# Patient Record
Sex: FEMALE | Race: WHITE | NOT HISPANIC OR LATINO | URBAN - METROPOLITAN AREA
[De-identification: names, ages, dates, MRNs, and addresses within clinical notes are randomized per-mention and may not be internally consistent; named-entity substitution may affect disease eponyms.]

---

## 2017-04-20 ENCOUNTER — HOSPITAL ENCOUNTER (OUTPATIENT)
Dept: MAMMOGRAPHY | Facility: CLINIC | Age: 77
Discharge: HOME | End: 2017-04-20
Attending: FAMILY MEDICINE

## 2017-04-20 ENCOUNTER — COMMUNICATION - HEALTHEAST (OUTPATIENT)
Dept: TELEHEALTH | Facility: CLINIC | Age: 77
End: 2017-04-20

## 2017-04-20 DIAGNOSIS — Z12.31 VISIT FOR SCREENING MAMMOGRAM: ICD-10-CM

## 2017-07-05 ENCOUNTER — AMBULATORY - HEALTHEAST (OUTPATIENT)
Dept: ADMINISTRATIVE | Facility: REHABILITATION | Age: 77
End: 2017-07-05

## 2017-07-05 DIAGNOSIS — M12.811 ROTATOR CUFF TEAR ARTHROPATHY OF RIGHT SHOULDER: ICD-10-CM

## 2017-07-05 DIAGNOSIS — M75.101 ROTATOR CUFF TEAR ARTHROPATHY OF RIGHT SHOULDER: ICD-10-CM

## 2017-07-10 ENCOUNTER — OFFICE VISIT - HEALTHEAST (OUTPATIENT)
Dept: PHYSICAL THERAPY | Facility: REHABILITATION | Age: 77
End: 2017-07-10

## 2017-07-10 ENCOUNTER — COMMUNICATION - HEALTHEAST (OUTPATIENT)
Dept: TELEHEALTH | Facility: CLINIC | Age: 77
End: 2017-07-10

## 2017-07-10 DIAGNOSIS — M81.0 OSTEOPOROSIS: ICD-10-CM

## 2017-07-10 DIAGNOSIS — M75.101 ROTATOR CUFF TEAR ARTHROPATHY OF RIGHT SHOULDER: ICD-10-CM

## 2017-07-10 DIAGNOSIS — M12.811 ROTATOR CUFF TEAR ARTHROPATHY OF RIGHT SHOULDER: ICD-10-CM

## 2017-07-10 DIAGNOSIS — M25.511 PAIN IN JOINT OF RIGHT SHOULDER: ICD-10-CM

## 2017-07-12 ENCOUNTER — OFFICE VISIT - HEALTHEAST (OUTPATIENT)
Dept: PHYSICAL THERAPY | Facility: REHABILITATION | Age: 77
End: 2017-07-12

## 2017-07-12 DIAGNOSIS — M75.101 ROTATOR CUFF TEAR ARTHROPATHY OF RIGHT SHOULDER: ICD-10-CM

## 2017-07-12 DIAGNOSIS — M25.511 PAIN IN JOINT OF RIGHT SHOULDER: ICD-10-CM

## 2017-07-12 DIAGNOSIS — M81.0 OSTEOPOROSIS: ICD-10-CM

## 2017-07-12 DIAGNOSIS — M12.811 ROTATOR CUFF TEAR ARTHROPATHY OF RIGHT SHOULDER: ICD-10-CM

## 2017-07-19 ENCOUNTER — OFFICE VISIT - HEALTHEAST (OUTPATIENT)
Dept: PHYSICAL THERAPY | Facility: REHABILITATION | Age: 77
End: 2017-07-19

## 2017-07-19 DIAGNOSIS — M12.811 ROTATOR CUFF TEAR ARTHROPATHY OF RIGHT SHOULDER: ICD-10-CM

## 2017-07-19 DIAGNOSIS — M25.511 PAIN IN JOINT OF RIGHT SHOULDER: ICD-10-CM

## 2017-07-19 DIAGNOSIS — M75.101 ROTATOR CUFF TEAR ARTHROPATHY OF RIGHT SHOULDER: ICD-10-CM

## 2017-07-19 DIAGNOSIS — M81.0 OSTEOPOROSIS: ICD-10-CM

## 2017-07-26 ENCOUNTER — OFFICE VISIT - HEALTHEAST (OUTPATIENT)
Dept: PHYSICAL THERAPY | Facility: REHABILITATION | Age: 77
End: 2017-07-26

## 2017-07-26 DIAGNOSIS — M75.101 ROTATOR CUFF TEAR ARTHROPATHY OF RIGHT SHOULDER: ICD-10-CM

## 2017-07-26 DIAGNOSIS — M12.811 ROTATOR CUFF TEAR ARTHROPATHY OF RIGHT SHOULDER: ICD-10-CM

## 2017-07-26 DIAGNOSIS — M81.0 OSTEOPOROSIS: ICD-10-CM

## 2017-07-26 DIAGNOSIS — M25.511 PAIN IN JOINT OF RIGHT SHOULDER: ICD-10-CM

## 2017-08-02 ENCOUNTER — OFFICE VISIT - HEALTHEAST (OUTPATIENT)
Dept: PHYSICAL THERAPY | Facility: REHABILITATION | Age: 77
End: 2017-08-02

## 2017-08-02 DIAGNOSIS — M75.101 ROTATOR CUFF TEAR ARTHROPATHY OF RIGHT SHOULDER: ICD-10-CM

## 2017-08-02 DIAGNOSIS — M12.811 ROTATOR CUFF TEAR ARTHROPATHY OF RIGHT SHOULDER: ICD-10-CM

## 2017-08-02 DIAGNOSIS — M81.0 OSTEOPOROSIS: ICD-10-CM

## 2017-08-02 DIAGNOSIS — M25.511 PAIN IN JOINT OF RIGHT SHOULDER: ICD-10-CM

## 2017-08-09 ENCOUNTER — OFFICE VISIT - HEALTHEAST (OUTPATIENT)
Dept: PHYSICAL THERAPY | Facility: REHABILITATION | Age: 77
End: 2017-08-09

## 2017-08-09 DIAGNOSIS — M75.101 ROTATOR CUFF TEAR ARTHROPATHY OF RIGHT SHOULDER: ICD-10-CM

## 2017-08-09 DIAGNOSIS — M12.811 ROTATOR CUFF TEAR ARTHROPATHY OF RIGHT SHOULDER: ICD-10-CM

## 2017-08-09 DIAGNOSIS — M25.511 PAIN IN JOINT OF RIGHT SHOULDER: ICD-10-CM

## 2017-08-09 DIAGNOSIS — M81.0 OSTEOPOROSIS: ICD-10-CM

## 2017-08-16 ENCOUNTER — OFFICE VISIT - HEALTHEAST (OUTPATIENT)
Dept: PHYSICAL THERAPY | Facility: REHABILITATION | Age: 77
End: 2017-08-16

## 2017-08-16 DIAGNOSIS — M25.511 PAIN IN JOINT OF RIGHT SHOULDER: ICD-10-CM

## 2017-08-16 DIAGNOSIS — M12.811 ROTATOR CUFF TEAR ARTHROPATHY OF RIGHT SHOULDER: ICD-10-CM

## 2017-08-16 DIAGNOSIS — M75.101 ROTATOR CUFF TEAR ARTHROPATHY OF RIGHT SHOULDER: ICD-10-CM

## 2017-08-16 DIAGNOSIS — M81.0 OSTEOPOROSIS: ICD-10-CM

## 2017-08-23 ENCOUNTER — OFFICE VISIT - HEALTHEAST (OUTPATIENT)
Dept: PHYSICAL THERAPY | Facility: REHABILITATION | Age: 77
End: 2017-08-23

## 2017-08-23 DIAGNOSIS — M75.101 ROTATOR CUFF TEAR ARTHROPATHY OF RIGHT SHOULDER: ICD-10-CM

## 2017-08-23 DIAGNOSIS — M81.0 OSTEOPOROSIS: ICD-10-CM

## 2017-08-23 DIAGNOSIS — M12.811 ROTATOR CUFF TEAR ARTHROPATHY OF RIGHT SHOULDER: ICD-10-CM

## 2017-08-23 DIAGNOSIS — M25.511 PAIN IN JOINT OF RIGHT SHOULDER: ICD-10-CM

## 2017-09-11 ENCOUNTER — OFFICE VISIT - HEALTHEAST (OUTPATIENT)
Dept: PHYSICAL THERAPY | Facility: REHABILITATION | Age: 77
End: 2017-09-11

## 2017-09-11 DIAGNOSIS — M25.511 PAIN IN JOINT OF RIGHT SHOULDER: ICD-10-CM

## 2017-09-11 DIAGNOSIS — M75.101 ROTATOR CUFF TEAR ARTHROPATHY OF RIGHT SHOULDER: ICD-10-CM

## 2017-09-11 DIAGNOSIS — M12.811 ROTATOR CUFF TEAR ARTHROPATHY OF RIGHT SHOULDER: ICD-10-CM

## 2017-09-11 DIAGNOSIS — M81.0 OSTEOPOROSIS: ICD-10-CM

## 2018-05-29 ENCOUNTER — HOSPITAL ENCOUNTER (OUTPATIENT)
Dept: MAMMOGRAPHY | Facility: CLINIC | Age: 78
Discharge: HOME | End: 2018-05-29

## 2018-05-29 DIAGNOSIS — Z12.31 VISIT FOR SCREENING MAMMOGRAM: ICD-10-CM

## 2018-08-20 ENCOUNTER — RECORDS - HEALTHEAST (OUTPATIENT)
Dept: LAB | Facility: CLINIC | Age: 78
End: 2018-08-20

## 2018-08-20 LAB
ALBUMIN SERPL-MCNC: 3.9 G/DL (ref 3.5–5)
ANION GAP SERPL CALCULATED.3IONS-SCNC: 11 MMOL/L (ref 5–18)
BUN SERPL-MCNC: 13 MG/DL (ref 8–28)
CALCIUM SERPL-MCNC: 10 MG/DL (ref 8.5–10.5)
CHLORIDE BLD-SCNC: 104 MMOL/L (ref 98–107)
CO2 SERPL-SCNC: 24 MMOL/L (ref 22–31)
CREAT SERPL-MCNC: 0.71 MG/DL (ref 0.6–1.1)
GFR SERPL CREATININE-BSD FRML MDRD: >60 ML/MIN/1.73M2
GLUCOSE BLD-MCNC: 82 MG/DL (ref 70–125)
PHOSPHATE SERPL-MCNC: 3.8 MG/DL (ref 2.5–4.5)
POTASSIUM BLD-SCNC: 5.3 MMOL/L (ref 3.5–5)
SODIUM SERPL-SCNC: 139 MMOL/L (ref 136–145)

## 2018-08-27 ASSESSMENT — MIFFLIN-ST. JEOR
SCORE: 922.21
SCORE: 914.27

## 2018-08-30 ENCOUNTER — ANESTHESIA - HEALTHEAST (OUTPATIENT)
Dept: SURGERY | Facility: CLINIC | Age: 78
End: 2018-08-30

## 2018-08-30 ENCOUNTER — SURGERY - HEALTHEAST (OUTPATIENT)
Dept: SURGERY | Facility: CLINIC | Age: 78
End: 2018-08-30

## 2018-08-30 ASSESSMENT — MIFFLIN-ST. JEOR: SCORE: 918.8

## 2018-09-12 ENCOUNTER — RECORDS - HEALTHEAST (OUTPATIENT)
Dept: LAB | Facility: CLINIC | Age: 78
End: 2018-09-12

## 2018-09-12 LAB
CHOLEST SERPL-MCNC: 179 MG/DL
FASTING STATUS PATIENT QL REPORTED: NO
HDLC SERPL-MCNC: 61 MG/DL
LDLC SERPL CALC-MCNC: 105 MG/DL
TRIGL SERPL-MCNC: 67 MG/DL

## 2018-09-13 LAB — 25(OH)D3 SERPL-MCNC: 58 NG/ML (ref 30–80)

## 2018-11-06 ASSESSMENT — MIFFLIN-ST. JEOR: SCORE: 905.19

## 2018-11-08 ENCOUNTER — SURGERY - HEALTHEAST (OUTPATIENT)
Dept: SURGERY | Facility: CLINIC | Age: 78
End: 2018-11-08

## 2018-11-08 ENCOUNTER — ANESTHESIA - HEALTHEAST (OUTPATIENT)
Dept: SURGERY | Facility: CLINIC | Age: 78
End: 2018-11-08

## 2018-11-08 ASSESSMENT — MIFFLIN-ST. JEOR: SCORE: 897.54

## 2019-03-28 ENCOUNTER — RECORDS - HEALTHEAST (OUTPATIENT)
Dept: LAB | Facility: CLINIC | Age: 79
End: 2019-03-28

## 2019-03-28 LAB
ALBUMIN SERPL-MCNC: 3.7 G/DL (ref 3.5–5)
ALP SERPL-CCNC: 70 U/L (ref 45–120)
ALT SERPL W P-5'-P-CCNC: 22 U/L (ref 0–45)
ANION GAP SERPL CALCULATED.3IONS-SCNC: 7 MMOL/L (ref 5–18)
AST SERPL W P-5'-P-CCNC: 25 U/L (ref 0–40)
BILIRUB SERPL-MCNC: 0.4 MG/DL (ref 0–1)
BUN SERPL-MCNC: 25 MG/DL (ref 8–28)
CALCIUM SERPL-MCNC: 9.8 MG/DL (ref 8.5–10.5)
CHLORIDE BLD-SCNC: 107 MMOL/L (ref 98–107)
CO2 SERPL-SCNC: 27 MMOL/L (ref 22–31)
CREAT SERPL-MCNC: 0.8 MG/DL (ref 0.6–1.1)
GFR SERPL CREATININE-BSD FRML MDRD: >60 ML/MIN/1.73M2
GLUCOSE BLD-MCNC: 110 MG/DL (ref 70–125)
POTASSIUM BLD-SCNC: 4.6 MMOL/L (ref 3.5–5)
PROT SERPL-MCNC: 6.5 G/DL (ref 6–8)
SODIUM SERPL-SCNC: 141 MMOL/L (ref 136–145)
TSH SERPL DL<=0.005 MIU/L-ACNC: 1.07 UIU/ML (ref 0.3–5)

## 2019-03-28 ASSESSMENT — MIFFLIN-ST. JEOR
SCORE: 931.28
SCORE: 931.28

## 2019-03-29 ENCOUNTER — SURGERY - HEALTHEAST (OUTPATIENT)
Dept: CARDIOLOGY | Facility: CLINIC | Age: 79
End: 2019-03-29

## 2019-03-30 ASSESSMENT — MIFFLIN-ST. JEOR: SCORE: 940.35

## 2019-04-05 ENCOUNTER — AMBULATORY - HEALTHEAST (OUTPATIENT)
Dept: CARDIOLOGY | Facility: CLINIC | Age: 79
End: 2019-04-05

## 2019-04-05 DIAGNOSIS — Z95.0 CARDIAC PACEMAKER IN SITU: ICD-10-CM

## 2019-04-05 LAB
HCC DEVICE COMMENTS: NORMAL
HCC DEVICE IMPLANTING PROVIDER: NORMAL
HCC DEVICE MANUFACTURE: NORMAL
HCC DEVICE MODEL: NORMAL
HCC DEVICE SERIAL NUMBER: NORMAL
HCC DEVICE TYPE: NORMAL

## 2019-04-18 ENCOUNTER — AMBULATORY - HEALTHEAST (OUTPATIENT)
Dept: CARDIOLOGY | Facility: CLINIC | Age: 79
End: 2019-04-18

## 2019-04-18 ENCOUNTER — HOSPITAL ENCOUNTER (OUTPATIENT)
Dept: RADIOLOGY | Facility: CLINIC | Age: 79
Discharge: HOME | End: 2019-04-18
Attending: NURSE PRACTITIONER

## 2019-04-18 ENCOUNTER — COMMUNICATION - HEALTHEAST (OUTPATIENT)
Dept: CARDIOLOGY | Facility: CLINIC | Age: 79
End: 2019-04-18

## 2019-04-18 DIAGNOSIS — Z95.0 CARDIAC PACEMAKER IN SITU: ICD-10-CM

## 2019-04-18 DIAGNOSIS — R06.02 SOB (SHORTNESS OF BREATH): ICD-10-CM

## 2019-04-19 ENCOUNTER — RECORDS - HEALTHEAST (OUTPATIENT)
Dept: ADMINISTRATIVE | Facility: OTHER | Age: 79
End: 2019-04-19

## 2019-04-19 ENCOUNTER — COMMUNICATION - HEALTHEAST (OUTPATIENT)
Dept: CARDIOLOGY | Facility: CLINIC | Age: 79
End: 2019-04-19

## 2019-04-19 ENCOUNTER — AMBULATORY - HEALTHEAST (OUTPATIENT)
Dept: CARDIOLOGY | Facility: CLINIC | Age: 79
End: 2019-04-19

## 2019-04-19 DIAGNOSIS — R06.02 SHORTNESS OF BREATH: ICD-10-CM

## 2019-04-22 ENCOUNTER — HOSPITAL ENCOUNTER (OUTPATIENT)
Dept: CARDIOLOGY | Facility: CLINIC | Age: 79
Discharge: HOME | End: 2019-04-22
Attending: INTERNAL MEDICINE

## 2019-04-22 DIAGNOSIS — R06.02 SHORTNESS OF BREATH: ICD-10-CM

## 2019-04-22 LAB
ASCENDING AORTA: 3.5 CM
BSA FOR ECHO PROCEDURE: 1.51 M2
CV BLOOD PRESSURE: ABNORMAL MMHG
CV ECHO HEIGHT: 62 IN
CV ECHO WEIGHT: 115 LBS
EJECTION FRACTION: 38 % (ref 55–75)
FRACTIONAL SHORTENING: 20.9 % (ref 28–44)
INTERVENTRICULAR SEPTUM IN END DIASTOLE: 0.86 CM (ref 0.6–0.9)
IVS/PW RATIO: 1
LA AREA 1: 15.4 CM2
LA AREA 2: 17.9 CM2
LEFT ATRIUM LENGTH: 4.8 CM
LEFT ATRIUM VOLUME INDEX: 32.3 ML/M2
LEFT ATRIUM VOLUME: 48.8 ML
LEFT VENTRICLE DIASTOLIC VOLUME INDEX: 54 CM3/M2 (ref 29–61)
LEFT VENTRICLE DIASTOLIC VOLUME: 81.6 CM3 (ref 46–106)
LEFT VENTRICLE MASS INDEX: 90.5 G/M2
LEFT VENTRICLE SYSTOLIC VOLUME INDEX: 33.6 CM3/M2 (ref 8–24)
LEFT VENTRICLE SYSTOLIC VOLUME: 50.7 CM3 (ref 14–42)
LEFT VENTRICULAR INTERNAL DIMENSION IN DIASTOLE: 4.79 CM (ref 3.8–5.2)
LEFT VENTRICULAR INTERNAL DIMENSION IN SYSTOLE: 3.79 CM (ref 2.2–3.5)
LEFT VENTRICULAR MASS: 136.7 G
LEFT VENTRICULAR POSTERIOR WALL IN END DIASTOLE: 0.84 CM (ref 0.6–0.9)
NUC REST DIASTOLIC VOLUME INDEX: 1840 LBS
NUC REST SYSTOLIC VOLUME INDEX: 62 IN
TRICUSPID REGURGITATION PEAK PRESSURE GRADIENT: 12.4 MMHG
TRICUSPID VALVE ANULAR PLANE SYSTOLIC EXCURSION: 2.5 CM
TRICUSPID VALVE PEAK REGURGITANT VELOCITY: 176 CM/S

## 2019-04-22 ASSESSMENT — MIFFLIN-ST. JEOR: SCORE: 944.89

## 2019-04-23 ENCOUNTER — AMBULATORY - HEALTHEAST (OUTPATIENT)
Dept: CARDIOLOGY | Facility: CLINIC | Age: 79
End: 2019-04-23

## 2019-04-23 ENCOUNTER — OFFICE VISIT - HEALTHEAST (OUTPATIENT)
Dept: CARDIOLOGY | Facility: CLINIC | Age: 79
End: 2019-04-23

## 2019-04-23 DIAGNOSIS — R07.89 ATYPICAL CHEST PAIN: ICD-10-CM

## 2019-04-23 DIAGNOSIS — Z95.0 CARDIAC PACEMAKER IN SITU: ICD-10-CM

## 2019-04-23 DIAGNOSIS — R06.02 SHORTNESS OF BREATH: ICD-10-CM

## 2019-04-23 DIAGNOSIS — R06.00 PND (PAROXYSMAL NOCTURNAL DYSPNEA): ICD-10-CM

## 2019-04-23 LAB
ALBUMIN SERPL-MCNC: 3.7 G/DL (ref 3.5–5)
ALP SERPL-CCNC: 66 U/L (ref 45–120)
ALT SERPL W P-5'-P-CCNC: 12 U/L (ref 0–45)
ANION GAP SERPL CALCULATED.3IONS-SCNC: 7 MMOL/L (ref 5–18)
AST SERPL W P-5'-P-CCNC: 23 U/L (ref 0–40)
ATRIAL RATE - MUSE: 60 BPM
BILIRUB SERPL-MCNC: 0.3 MG/DL (ref 0–1)
BNP SERPL-MCNC: 100 PG/ML (ref 0–147)
BUN SERPL-MCNC: 18 MG/DL (ref 8–28)
CALCIUM SERPL-MCNC: 10.4 MG/DL (ref 8.5–10.5)
CHLORIDE BLD-SCNC: 104 MMOL/L (ref 98–107)
CO2 SERPL-SCNC: 28 MMOL/L (ref 22–31)
CREAT SERPL-MCNC: 0.71 MG/DL (ref 0.6–1.1)
CRP SERPL HS-MCNC: 2.3 MG/L (ref 0–3)
DIASTOLIC BLOOD PRESSURE - MUSE: NORMAL MMHG
ERYTHROCYTE [DISTWIDTH] IN BLOOD BY AUTOMATED COUNT: 14.2 % (ref 11–14.5)
ERYTHROCYTE [SEDIMENTATION RATE] IN BLOOD BY WESTERGREN METHOD: 14 MM/HR (ref 0–20)
GFR SERPL CREATININE-BSD FRML MDRD: >60 ML/MIN/1.73M2
GLUCOSE BLD-MCNC: 78 MG/DL (ref 70–125)
HCT VFR BLD AUTO: 37.7 % (ref 35–47)
HGB BLD-MCNC: 11.9 G/DL (ref 12–16)
INTERPRETATION ECG - MUSE: NORMAL
MCH RBC QN AUTO: 28.8 PG (ref 27–34)
MCHC RBC AUTO-ENTMCNC: 31.6 G/DL (ref 32–36)
MCV RBC AUTO: 91 FL (ref 80–100)
P AXIS - MUSE: 54 DEGREES
PLATELET # BLD AUTO: 226 THOU/UL (ref 140–440)
PMV BLD AUTO: 9.2 FL (ref 8.5–12.5)
POTASSIUM BLD-SCNC: 5.3 MMOL/L (ref 3.5–5)
PR INTERVAL - MUSE: 138 MS
PROT SERPL-MCNC: 7.5 G/DL (ref 6–8)
QRS DURATION - MUSE: 136 MS
QT - MUSE: 474 MS
QTC - MUSE: 474 MS
R AXIS - MUSE: -42 DEGREES
RBC # BLD AUTO: 4.13 MILL/UL (ref 3.8–5.4)
SODIUM SERPL-SCNC: 139 MMOL/L (ref 136–145)
SYSTOLIC BLOOD PRESSURE - MUSE: NORMAL MMHG
T AXIS - MUSE: 125 DEGREES
VENTRICULAR RATE- MUSE: 60 BPM
WBC: 7.5 THOU/UL (ref 4–11)

## 2019-04-23 ASSESSMENT — MIFFLIN-ST. JEOR: SCORE: 931.28

## 2019-04-26 ENCOUNTER — COMMUNICATION - HEALTHEAST (OUTPATIENT)
Dept: CARDIOLOGY | Facility: CLINIC | Age: 79
End: 2019-04-26

## 2019-04-29 ENCOUNTER — COMMUNICATION - HEALTHEAST (OUTPATIENT)
Dept: CARDIOLOGY | Facility: CLINIC | Age: 79
End: 2019-04-29

## 2019-05-02 ENCOUNTER — RECORDS - HEALTHEAST (OUTPATIENT)
Dept: ADMINISTRATIVE | Facility: OTHER | Age: 79
End: 2019-05-02

## 2019-05-07 ENCOUNTER — OFFICE VISIT - HEALTHEAST (OUTPATIENT)
Dept: CARDIOLOGY | Facility: CLINIC | Age: 79
End: 2019-05-07

## 2019-05-07 ENCOUNTER — AMBULATORY - HEALTHEAST (OUTPATIENT)
Dept: CARDIOLOGY | Facility: CLINIC | Age: 79
End: 2019-05-07

## 2019-05-07 ENCOUNTER — RECORDS - HEALTHEAST (OUTPATIENT)
Dept: ADMINISTRATIVE | Facility: OTHER | Age: 79
End: 2019-05-07

## 2019-05-07 DIAGNOSIS — Z95.0 CARDIAC PACEMAKER IN SITU: ICD-10-CM

## 2019-05-07 DIAGNOSIS — R94.39 ABNORMAL RESULT OF OTHER CARDIOVASCULAR FUNCTION STUDY: ICD-10-CM

## 2019-05-07 DIAGNOSIS — I26.99 OTHER ACUTE PULMONARY EMBOLISM WITHOUT ACUTE COR PULMONALE (H): ICD-10-CM

## 2019-05-07 DIAGNOSIS — R07.9 CHEST PAIN, UNSPECIFIED TYPE: ICD-10-CM

## 2019-05-07 DIAGNOSIS — I50.21 ACUTE HFREF (HEART FAILURE WITH REDUCED EJECTION FRACTION) (H): ICD-10-CM

## 2019-05-07 DIAGNOSIS — I44.2 COMPLETE AV BLOCK (H): ICD-10-CM

## 2019-05-07 DIAGNOSIS — R06.09 EXERTIONAL DYSPNEA: ICD-10-CM

## 2019-05-07 ASSESSMENT — MIFFLIN-ST. JEOR: SCORE: 926.74

## 2019-05-16 ENCOUNTER — COMMUNICATION - HEALTHEAST (OUTPATIENT)
Dept: CARDIOLOGY | Facility: CLINIC | Age: 79
End: 2019-05-16

## 2019-05-17 ENCOUNTER — RECORDS - HEALTHEAST (OUTPATIENT)
Dept: ADMINISTRATIVE | Facility: OTHER | Age: 79
End: 2019-05-17

## 2019-05-17 ENCOUNTER — COMMUNICATION - HEALTHEAST (OUTPATIENT)
Dept: CARDIOLOGY | Facility: CLINIC | Age: 79
End: 2019-05-17

## 2019-05-20 ENCOUNTER — COMMUNICATION - HEALTHEAST (OUTPATIENT)
Dept: CARDIOLOGY | Facility: CLINIC | Age: 79
End: 2019-05-20

## 2019-05-20 DIAGNOSIS — Z51.81 ENCOUNTER FOR THERAPEUTIC DRUG MONITORING: ICD-10-CM

## 2019-05-20 DIAGNOSIS — E78.1 HYPERTRIGLYCERIDEMIA: ICD-10-CM

## 2019-05-20 DIAGNOSIS — R06.09 EXERTIONAL DYSPNEA: ICD-10-CM

## 2019-05-20 DIAGNOSIS — I50.21 ACUTE HFREF (HEART FAILURE WITH REDUCED EJECTION FRACTION) (H): ICD-10-CM

## 2019-05-21 ENCOUNTER — AMBULATORY - HEALTHEAST (OUTPATIENT)
Dept: CARDIOLOGY | Facility: CLINIC | Age: 79
End: 2019-05-21

## 2019-05-21 ENCOUNTER — HOSPITAL ENCOUNTER (OUTPATIENT)
Dept: CT IMAGING | Facility: CLINIC | Age: 79
Discharge: HOME | End: 2019-05-21
Attending: INTERNAL MEDICINE

## 2019-05-21 DIAGNOSIS — I50.21 ACUTE HFREF (HEART FAILURE WITH REDUCED EJECTION FRACTION) (H): ICD-10-CM

## 2019-05-21 DIAGNOSIS — R94.39 ABNORMAL RESULT OF OTHER CARDIOVASCULAR FUNCTION STUDY: ICD-10-CM

## 2019-05-21 DIAGNOSIS — R06.09 EXERTIONAL DYSPNEA: ICD-10-CM

## 2019-05-21 DIAGNOSIS — R06.09 OTHER FORMS OF DYSPNEA: ICD-10-CM

## 2019-05-21 DIAGNOSIS — I31.39 PERICARDIAL EFFUSION: ICD-10-CM

## 2019-05-21 LAB
BSA FOR ECHO PROCEDURE: 1.48 M2
LEFT VENTRICLE HEART RATE: 60 BPM

## 2019-05-21 ASSESSMENT — MIFFLIN-ST. JEOR: SCORE: 926.74

## 2019-05-22 ENCOUNTER — COMMUNICATION - HEALTHEAST (OUTPATIENT)
Dept: CARDIOLOGY | Facility: CLINIC | Age: 79
End: 2019-05-22

## 2019-05-22 ENCOUNTER — HOSPITAL ENCOUNTER (OUTPATIENT)
Dept: CARDIOLOGY | Facility: CLINIC | Age: 79
Discharge: HOME | End: 2019-05-22
Attending: INTERNAL MEDICINE

## 2019-05-22 DIAGNOSIS — I31.39 PERICARDIAL EFFUSION: ICD-10-CM

## 2019-05-22 LAB
AORTIC ROOT: 3.5 CM
BSA FOR ECHO PROCEDURE: 1.48 M2
CV BLOOD PRESSURE: ABNORMAL MMHG
CV ECHO HEIGHT: 62 IN
CV ECHO WEIGHT: 111 LBS
DOP CALC LVOT AREA: 3.14 CM2
DOP CALC LVOT DIAMETER: 2 CM
ECHO EJECTION FRACTION ESTIMATED: 40 %
EJECTION FRACTION: 50 % (ref 55–75)
FRACTIONAL SHORTENING: 25.5 % (ref 28–44)
INTERVENTRICULAR SEPTUM IN END DIASTOLE: 0.8 CM (ref 0.6–0.9)
IVS/PW RATIO: 0.8
LA AREA 1: 21.9 CM2
LA AREA 2: 21.4 CM2
LEFT ATRIUM LENGTH: 5.03 CM
LEFT ATRIUM SIZE: 2.6 CM
LEFT ATRIUM TO AORTIC ROOT RATIO: 0.74 NO UNITS
LEFT ATRIUM VOLUME INDEX: 53.5 ML/M2
LEFT ATRIUM VOLUME: 79.2 ML
LEFT VENTRICLE DIASTOLIC VOLUME INDEX: 84.5 CM3/M2 (ref 29–61)
LEFT VENTRICLE DIASTOLIC VOLUME: 125 CM3 (ref 46–106)
LEFT VENTRICLE HEART RATE: 91 BPM
LEFT VENTRICLE MASS INDEX: 110.5 G/M2
LEFT VENTRICLE SYSTOLIC VOLUME INDEX: 42.6 CM3/M2 (ref 8–24)
LEFT VENTRICLE SYSTOLIC VOLUME: 63 CM3 (ref 14–42)
LEFT VENTRICULAR INTERNAL DIMENSION IN DIASTOLE: 5.1 CM (ref 3.8–5.2)
LEFT VENTRICULAR INTERNAL DIMENSION IN SYSTOLE: 3.8 CM (ref 2.2–3.5)
LEFT VENTRICULAR MASS: 163.6 G
LEFT VENTRICULAR POSTERIOR WALL IN END DIASTOLE: 1 CM (ref 0.6–0.9)
MV AVERAGE E/E' RATIO: 8.1 CM/S
MV DECELERATION TIME: 60 MS
MV E'TISSUE VEL-LAT: 11 CM/S
MV E'TISSUE VEL-MED: 10.7 CM/S
MV LATERAL E/E' RATIO: 7.9
MV MEDIAL E/E' RATIO: 8.2
MV PEAK E VELOCITY: 87.4 CM/S
NUC REST DIASTOLIC VOLUME INDEX: 1776 LBS
NUC REST SYSTOLIC VOLUME INDEX: 62 IN
PR MAX PG: 3 MMHG
PR PEAK VELOCITY: 93.7 CM/S
TRICUSPID REGURGITATION PEAK PRESSURE GRADIENT: 15.7 MMHG
TRICUSPID VALVE ANULAR PLANE SYSTOLIC EXCURSION: 2.1 CM
TRICUSPID VALVE PEAK REGURGITANT VELOCITY: 198 CM/S
TV PEAK GRADIENT: 0.1 MMHG
TV PEAK VELOCITY: 16.1 CM/S

## 2019-05-22 ASSESSMENT — MIFFLIN-ST. JEOR: SCORE: 926.74

## 2019-05-30 ENCOUNTER — AMBULATORY - HEALTHEAST (OUTPATIENT)
Dept: CARDIOLOGY | Facility: CLINIC | Age: 79
End: 2019-05-30

## 2019-06-03 ENCOUNTER — AMBULATORY - HEALTHEAST (OUTPATIENT)
Dept: CARDIOLOGY | Facility: CLINIC | Age: 79
End: 2019-06-03

## 2019-06-03 DIAGNOSIS — R06.09 DOE (DYSPNEA ON EXERTION): ICD-10-CM

## 2019-06-03 DIAGNOSIS — I31.39 PERICARDIAL EFFUSION: ICD-10-CM

## 2019-06-04 ENCOUNTER — AMBULATORY - HEALTHEAST (OUTPATIENT)
Dept: CARDIOLOGY | Facility: CLINIC | Age: 79
End: 2019-06-04

## 2019-06-04 ENCOUNTER — HOSPITAL ENCOUNTER (OUTPATIENT)
Dept: CARDIOLOGY | Facility: CLINIC | Age: 79
Discharge: HOME | End: 2019-06-04
Attending: INTERNAL MEDICINE

## 2019-06-04 DIAGNOSIS — R06.09 DOE (DYSPNEA ON EXERTION): ICD-10-CM

## 2019-06-04 DIAGNOSIS — I45.9 HEART BLOCK: ICD-10-CM

## 2019-06-04 DIAGNOSIS — Z95.0 CARDIAC PACEMAKER IN SITU: ICD-10-CM

## 2019-06-04 DIAGNOSIS — R06.09 OTHER FORMS OF DYSPNEA: ICD-10-CM

## 2019-06-04 DIAGNOSIS — I26.99 OTHER ACUTE PULMONARY EMBOLISM WITHOUT ACUTE COR PULMONALE (H): ICD-10-CM

## 2019-06-04 DIAGNOSIS — I50.21 ACUTE HFREF (HEART FAILURE WITH REDUCED EJECTION FRACTION) (H): ICD-10-CM

## 2019-06-04 DIAGNOSIS — I44.2 COMPLETE AV BLOCK (H): ICD-10-CM

## 2019-06-04 DIAGNOSIS — I31.39 PERICARDIAL EFFUSION: ICD-10-CM

## 2019-06-04 LAB
ANION GAP SERPL CALCULATED.3IONS-SCNC: 7 MMOL/L (ref 5–18)
BNP SERPL-MCNC: 128 PG/ML (ref 0–147)
BUN SERPL-MCNC: 19 MG/DL (ref 8–28)
CALCIUM SERPL-MCNC: 10 MG/DL (ref 8.5–10.5)
CHLORIDE BLD-SCNC: 105 MMOL/L (ref 98–107)
CO2 SERPL-SCNC: 28 MMOL/L (ref 22–31)
CREAT SERPL-MCNC: 0.73 MG/DL (ref 0.6–1.1)
ERYTHROCYTE [DISTWIDTH] IN BLOOD BY AUTOMATED COUNT: 14.1 % (ref 11–14.5)
GFR SERPL CREATININE-BSD FRML MDRD: >60 ML/MIN/1.73M2
GLUCOSE BLD-MCNC: 81 MG/DL (ref 70–125)
HCT VFR BLD AUTO: 35.7 % (ref 35–47)
HGB BLD-MCNC: 11.3 G/DL (ref 12–16)
MCH RBC QN AUTO: 28 PG (ref 27–34)
MCHC RBC AUTO-ENTMCNC: 31.7 G/DL (ref 32–36)
MCV RBC AUTO: 88 FL (ref 80–100)
PLATELET # BLD AUTO: 329 THOU/UL (ref 140–440)
PMV BLD AUTO: 8.7 FL (ref 8.5–12.5)
POTASSIUM BLD-SCNC: 4.5 MMOL/L (ref 3.5–5)
RBC # BLD AUTO: 4.04 MILL/UL (ref 3.8–5.4)
SODIUM SERPL-SCNC: 140 MMOL/L (ref 136–145)
WBC: 6.5 THOU/UL (ref 4–11)

## 2019-06-04 ASSESSMENT — MIFFLIN-ST. JEOR
SCORE: 917.67
SCORE: 917.67

## 2019-06-05 ENCOUNTER — AMBULATORY - HEALTHEAST (OUTPATIENT)
Dept: CARDIOLOGY | Facility: CLINIC | Age: 79
End: 2019-06-05

## 2019-06-05 LAB
BSA FOR ECHO PROCEDURE: 1.47 M2
CV BLOOD PRESSURE: ABNORMAL MMHG
CV ECHO HEIGHT: 62 IN
CV ECHO WEIGHT: 109 LBS
EJECTION FRACTION: 49 % (ref 55–75)
FRACTIONAL SHORTENING: 23.9 % (ref 28–44)
INTERVENTRICULAR SEPTUM IN END DIASTOLE: 1.1 CM (ref 0.6–0.9)
IVS/PW RATIO: 1.2
LA AREA 1: 13.3 CM2
LA AREA 2: 16.4 CM2
LEFT ATRIUM LENGTH: 4.25 CM
LEFT ATRIUM SIZE: 4.1 CM
LEFT ATRIUM VOLUME INDEX: 29.7 ML/M2
LEFT ATRIUM VOLUME: 43.6 ML
LEFT VENTRICLE DIASTOLIC VOLUME INDEX: 48.3 CM3/M2 (ref 29–61)
LEFT VENTRICLE DIASTOLIC VOLUME: 71 CM3 (ref 46–106)
LEFT VENTRICLE HEART RATE: 60 BPM
LEFT VENTRICLE MASS INDEX: 108 G/M2
LEFT VENTRICLE SYSTOLIC VOLUME INDEX: 24.5 CM3/M2 (ref 8–24)
LEFT VENTRICLE SYSTOLIC VOLUME: 36 CM3 (ref 14–42)
LEFT VENTRICULAR INTERNAL DIMENSION IN DIASTOLE: 4.6 CM (ref 3.8–5.2)
LEFT VENTRICULAR INTERNAL DIMENSION IN SYSTOLE: 3.5 CM (ref 2.2–3.5)
LEFT VENTRICULAR MASS: 158.8 G
LEFT VENTRICULAR POSTERIOR WALL IN END DIASTOLE: 0.9 CM (ref 0.6–0.9)
NUC REST DIASTOLIC VOLUME INDEX: 1744 LBS
NUC REST SYSTOLIC VOLUME INDEX: 62 IN

## 2019-06-06 ENCOUNTER — RECORDS - HEALTHEAST (OUTPATIENT)
Dept: LAB | Facility: CLINIC | Age: 79
End: 2019-06-06

## 2019-06-06 ENCOUNTER — RECORDS - HEALTHEAST (OUTPATIENT)
Dept: ADMINISTRATIVE | Facility: OTHER | Age: 79
End: 2019-06-06

## 2019-06-06 LAB
ALBUMIN SERPL-MCNC: 3.3 G/DL (ref 3.5–5)
ANION GAP SERPL CALCULATED.3IONS-SCNC: 9 MMOL/L (ref 5–18)
BUN SERPL-MCNC: 20 MG/DL (ref 8–28)
CALCIUM SERPL-MCNC: 9.6 MG/DL (ref 8.5–10.5)
CHLORIDE BLD-SCNC: 106 MMOL/L (ref 98–107)
CHOLEST SERPL-MCNC: 182 MG/DL
CO2 SERPL-SCNC: 25 MMOL/L (ref 22–31)
CREAT SERPL-MCNC: 0.74 MG/DL (ref 0.6–1.1)
FASTING STATUS PATIENT QL REPORTED: YES
GFR SERPL CREATININE-BSD FRML MDRD: >60 ML/MIN/1.73M2
GLUCOSE BLD-MCNC: 84 MG/DL (ref 70–125)
HCC DEVICE COMMENTS: NORMAL
HCC DEVICE IMPLANTING PROVIDER: NORMAL
HCC DEVICE MANUFACTURE: NORMAL
HCC DEVICE MODEL: NORMAL
HCC DEVICE SERIAL NUMBER: NORMAL
HCC DEVICE TYPE: NORMAL
HDLC SERPL-MCNC: 53 MG/DL
LDLC SERPL CALC-MCNC: 113 MG/DL
PHOSPHATE SERPL-MCNC: 4.2 MG/DL (ref 2.5–4.5)
POTASSIUM BLD-SCNC: 4.6 MMOL/L (ref 3.5–5)
SODIUM SERPL-SCNC: 140 MMOL/L (ref 136–145)
TRIGL SERPL-MCNC: 82 MG/DL

## 2019-06-07 ENCOUNTER — AMBULATORY - HEALTHEAST (OUTPATIENT)
Dept: CARDIOLOGY | Facility: CLINIC | Age: 79
End: 2019-06-07

## 2019-06-12 ENCOUNTER — AMBULATORY - HEALTHEAST (OUTPATIENT)
Dept: CARDIOLOGY | Facility: CLINIC | Age: 79
End: 2019-06-12

## 2019-06-19 ENCOUNTER — RECORDS - HEALTHEAST (OUTPATIENT)
Dept: ADMINISTRATIVE | Facility: OTHER | Age: 79
End: 2019-06-19

## 2019-06-19 ENCOUNTER — AMBULATORY - HEALTHEAST (OUTPATIENT)
Dept: CARDIOLOGY | Facility: CLINIC | Age: 79
End: 2019-06-19

## 2019-06-26 ENCOUNTER — OFFICE VISIT - HEALTHEAST (OUTPATIENT)
Dept: CARDIOLOGY | Facility: CLINIC | Age: 79
End: 2019-06-26

## 2019-06-26 DIAGNOSIS — R06.09 EXERTIONAL DYSPNEA: ICD-10-CM

## 2019-06-26 DIAGNOSIS — I44.2 COMPLETE AV BLOCK (H): ICD-10-CM

## 2019-06-26 DIAGNOSIS — I27.82 OTHER CHRONIC PULMONARY EMBOLISM WITHOUT ACUTE COR PULMONALE (H): ICD-10-CM

## 2019-06-26 DIAGNOSIS — I50.21 ACUTE HFREF (HEART FAILURE WITH REDUCED EJECTION FRACTION) (H): ICD-10-CM

## 2019-06-26 ASSESSMENT — MIFFLIN-ST. JEOR: SCORE: 930.15

## 2019-07-03 ENCOUNTER — HOSPITAL ENCOUNTER (OUTPATIENT)
Dept: MAMMOGRAPHY | Facility: CLINIC | Age: 79
Discharge: HOME | End: 2019-07-03
Attending: FAMILY MEDICINE

## 2019-07-03 DIAGNOSIS — Z12.31 VISIT FOR SCREENING MAMMOGRAM: ICD-10-CM

## 2019-07-05 ENCOUNTER — RECORDS - HEALTHEAST (OUTPATIENT)
Dept: ADMINISTRATIVE | Facility: OTHER | Age: 79
End: 2019-07-05

## 2019-07-09 ENCOUNTER — HOSPITAL ENCOUNTER (OUTPATIENT)
Dept: RESPIRATORY THERAPY | Facility: CLINIC | Age: 79
Discharge: HOME | End: 2019-07-09
Attending: INTERNAL MEDICINE

## 2019-07-09 DIAGNOSIS — R06.09 OTHER FORMS OF DYSPNEA: ICD-10-CM

## 2019-07-09 DIAGNOSIS — I27.82 OTHER CHRONIC PULMONARY EMBOLISM WITHOUT ACUTE COR PULMONALE (H): ICD-10-CM

## 2019-07-09 DIAGNOSIS — R06.09 EXERTIONAL DYSPNEA: ICD-10-CM

## 2019-07-09 LAB — HGB BLD-MCNC: 11 G/DL (ref 12–16)

## 2019-07-12 ENCOUNTER — COMMUNICATION - HEALTHEAST (OUTPATIENT)
Dept: CARDIOLOGY | Facility: CLINIC | Age: 79
End: 2019-07-12

## 2019-07-16 ENCOUNTER — AMBULATORY - HEALTHEAST (OUTPATIENT)
Dept: CARDIOLOGY | Facility: CLINIC | Age: 79
End: 2019-07-16

## 2019-07-16 DIAGNOSIS — I26.99 OTHER ACUTE PULMONARY EMBOLISM WITHOUT ACUTE COR PULMONALE (H): ICD-10-CM

## 2019-07-16 DIAGNOSIS — Z95.0 CARDIAC PACEMAKER IN SITU: ICD-10-CM

## 2019-07-16 DIAGNOSIS — I45.9 HEART BLOCK: ICD-10-CM

## 2019-07-16 LAB
ATRIAL RATE - MUSE: 49 BPM
DIASTOLIC BLOOD PRESSURE - MUSE: NORMAL MMHG
HCC DEVICE COMMENTS: NORMAL
HCC DEVICE IMPLANTING PROVIDER: NORMAL
HCC DEVICE MANUFACTURE: NORMAL
HCC DEVICE MODEL: NORMAL
HCC DEVICE SERIAL NUMBER: NORMAL
HCC DEVICE TYPE: NORMAL
INTERPRETATION ECG - MUSE: NORMAL
P AXIS - MUSE: 61 DEGREES
PR INTERVAL - MUSE: 154 MS
QRS DURATION - MUSE: 144 MS
QT - MUSE: 492 MS
QTC - MUSE: 444 MS
R AXIS - MUSE: -26 DEGREES
SYSTOLIC BLOOD PRESSURE - MUSE: NORMAL MMHG
T AXIS - MUSE: 191 DEGREES
VENTRICULAR RATE- MUSE: 49 BPM

## 2019-07-16 ASSESSMENT — MIFFLIN-ST. JEOR: SCORE: 916.54

## 2019-07-25 ENCOUNTER — HOSPITAL ENCOUNTER (OUTPATIENT)
Dept: INTERVENTIONAL RADIOLOGY/VASCULAR | Facility: CLINIC | Age: 79
Discharge: HOME | End: 2019-07-25
Attending: INTERNAL MEDICINE | Admitting: RADIOLOGY

## 2019-07-25 DIAGNOSIS — I26.99 OTHER ACUTE PULMONARY EMBOLISM WITHOUT ACUTE COR PULMONALE (H): ICD-10-CM

## 2019-07-25 DIAGNOSIS — Z95.0 CARDIAC PACEMAKER IN SITU: ICD-10-CM

## 2019-07-25 DIAGNOSIS — I45.9 HEART BLOCK: ICD-10-CM

## 2019-07-26 ENCOUNTER — HOSPITAL ENCOUNTER (OUTPATIENT)
Dept: RADIOLOGY | Facility: CLINIC | Age: 79
Discharge: HOME | End: 2019-07-26
Attending: INTERNAL MEDICINE

## 2019-07-26 ENCOUNTER — HOSPITAL ENCOUNTER (OUTPATIENT)
Dept: NUCLEAR MEDICINE | Facility: CLINIC | Age: 79
Discharge: HOME | End: 2019-07-26
Attending: INTERNAL MEDICINE

## 2019-07-26 ENCOUNTER — AMBULATORY - HEALTHEAST (OUTPATIENT)
Dept: CARDIOLOGY | Facility: CLINIC | Age: 79
End: 2019-07-26

## 2019-07-26 ENCOUNTER — HOSPITAL ENCOUNTER (OUTPATIENT)
Dept: CARDIOLOGY | Facility: CLINIC | Age: 79
Discharge: HOME | End: 2019-07-26
Attending: INTERNAL MEDICINE

## 2019-07-26 DIAGNOSIS — Z95.0 CARDIAC PACEMAKER IN SITU: ICD-10-CM

## 2019-07-26 DIAGNOSIS — I26.99 OTHER ACUTE PULMONARY EMBOLISM WITHOUT ACUTE COR PULMONALE (H): ICD-10-CM

## 2019-07-26 DIAGNOSIS — I45.9 HEART BLOCK: ICD-10-CM

## 2019-07-26 LAB
AORTIC VALVE MEAN VELOCITY: 87.5 CM/S
AR DECEL SLOPE: 941 MM/S2
AR PEAK VELOCITY: 390 CM/S
AV DIMENSIONLESS INDEX VTI: 0.6
AV MEAN GRADIENT: 3 MMHG
AV PEAK GRADIENT: 5.8 MMHG
AV REGURGITANT PEAK GRADIENT: 60.8 MMHG
AV REGURGITATION PRESSURE HALF TIME: 1213 MS
AV VALVE AREA: 2.2 CM2
AV VELOCITY RATIO: 0.6
BSA FOR ECHO PROCEDURE: 1.46 M2
CV BLOOD PRESSURE: ABNORMAL MMHG
CV ECHO HEIGHT: 62.5 IN
CV ECHO WEIGHT: 107 LBS
DOP CALC AO PEAK VEL: 120 CM/S
DOP CALC AO VTI: 25.5 CM
DOP CALC LVOT AREA: 3.8 CM2
DOP CALC LVOT DIAMETER: 2.2 CM
DOP CALC LVOT PEAK VEL: 68.1 CM/S
DOP CALC LVOT STROKE VOLUME: 56.2 CM3
DOP CALCLVOT PEAK VEL VTI: 14.8 CM
EJECTION FRACTION: 44 % (ref 55–75)
FRACTIONAL SHORTENING: 28.3 % (ref 28–44)
INTERVENTRICULAR SEPTUM IN END DIASTOLE: 0.9 CM (ref 0.6–0.9)
IVS/PW RATIO: 1.2
LA AREA 1: 13.4 CM2
LA AREA 2: 16.4 CM2
LEFT ATRIUM LENGTH: 4.83 CM
LEFT ATRIUM SIZE: 2.9 CM
LEFT ATRIUM VOLUME INDEX: 26.5 ML/M2
LEFT ATRIUM VOLUME: 38.7 ML
LEFT VENTRICLE DIASTOLIC VOLUME INDEX: 47.9 CM3/M2 (ref 29–61)
LEFT VENTRICLE DIASTOLIC VOLUME: 70 CM3 (ref 46–106)
LEFT VENTRICLE MASS INDEX: 81.7 G/M2
LEFT VENTRICLE SYSTOLIC VOLUME INDEX: 26.7 CM3/M2 (ref 8–24)
LEFT VENTRICLE SYSTOLIC VOLUME: 39 CM3 (ref 14–42)
LEFT VENTRICULAR INTERNAL DIMENSION IN DIASTOLE: 4.53 CM (ref 3.8–5.2)
LEFT VENTRICULAR INTERNAL DIMENSION IN SYSTOLE: 3.25 CM (ref 2.2–3.5)
LEFT VENTRICULAR MASS: 119.3 G
LEFT VENTRICULAR OUTFLOW TRACT MEAN GRADIENT: 1 MMHG
LEFT VENTRICULAR OUTFLOW TRACT MEAN VELOCITY: 51.5 CM/S
LEFT VENTRICULAR OUTFLOW TRACT PEAK GRADIENT: 2 MMHG
LEFT VENTRICULAR POSTERIOR WALL IN END DIASTOLE: 0.74 CM (ref 0.6–0.9)
LV STROKE VOLUME INDEX: 38.5 ML/M2
MITRAL VALVE E/A RATIO: 0.4
MV AVERAGE E/E' RATIO: 7.4 CM/S
MV DECELERATION TIME: 236 MS
MV E'TISSUE VEL-LAT: 4 CM/S
MV E'TISSUE VEL-MED: 4.29 CM/S
MV LATERAL E/E' RATIO: 7.7
MV MEDIAL E/E' RATIO: 7.1
MV PEAK A VELOCITY: 68.1 CM/S
MV PEAK E VELOCITY: 30.6 CM/S
NUC REST DIASTOLIC VOLUME INDEX: 1712 LBS
NUC REST SYSTOLIC VOLUME INDEX: 62.5 IN
PR MAX PG: 4 MMHG
PR PEAK VELOCITY: 102 CM/S
TRICUSPID REGURGITATION PEAK PRESSURE GRADIENT: 13.2 MMHG
TRICUSPID VALVE ANULAR PLANE SYSTOLIC EXCURSION: 2.1 CM
TRICUSPID VALVE PEAK REGURGITANT VELOCITY: 182 CM/S

## 2019-07-26 ASSESSMENT — MIFFLIN-ST. JEOR: SCORE: 916.54

## 2019-07-29 ENCOUNTER — SURGERY - HEALTHEAST (OUTPATIENT)
Dept: CARDIOLOGY | Facility: CLINIC | Age: 79
End: 2019-07-29

## 2019-07-29 ENCOUNTER — AMBULATORY - HEALTHEAST (OUTPATIENT)
Dept: CARDIOLOGY | Facility: CLINIC | Age: 79
End: 2019-07-29

## 2019-07-29 DIAGNOSIS — I44.2 COMPLETE AV BLOCK (H): ICD-10-CM

## 2019-07-29 DIAGNOSIS — R93.1 DECREASED CARDIAC EJECTION FRACTION: ICD-10-CM

## 2019-07-29 DIAGNOSIS — I44.7 LBBB (LEFT BUNDLE BRANCH BLOCK): ICD-10-CM

## 2019-07-29 DIAGNOSIS — R06.02 SHORTNESS OF BREATH: ICD-10-CM

## 2019-07-30 ENCOUNTER — COMMUNICATION - HEALTHEAST (OUTPATIENT)
Dept: CARDIOLOGY | Facility: CLINIC | Age: 79
End: 2019-07-30

## 2019-08-08 ENCOUNTER — COMMUNICATION - HEALTHEAST (OUTPATIENT)
Dept: CARDIOLOGY | Facility: CLINIC | Age: 79
End: 2019-08-08

## 2019-08-09 ENCOUNTER — RECORDS - HEALTHEAST (OUTPATIENT)
Dept: ADMINISTRATIVE | Facility: OTHER | Age: 79
End: 2019-08-09

## 2019-08-09 ENCOUNTER — AMBULATORY - HEALTHEAST (OUTPATIENT)
Dept: CARDIOLOGY | Facility: CLINIC | Age: 79
End: 2019-08-09

## 2019-08-12 ENCOUNTER — SURGERY - HEALTHEAST (OUTPATIENT)
Dept: CARDIOLOGY | Facility: CLINIC | Age: 79
End: 2019-08-12

## 2019-08-12 ASSESSMENT — MIFFLIN-ST. JEOR: SCORE: 918.8

## 2019-08-13 ASSESSMENT — MIFFLIN-ST. JEOR: SCORE: 901.34

## 2019-08-14 ENCOUNTER — ALLIED HEALTH/NURSE VISIT (OUTPATIENT)
Dept: PHARMACY | Facility: PHYSICIAN GROUP | Age: 79
End: 2019-08-14
Payer: COMMERCIAL

## 2019-08-14 DIAGNOSIS — M81.0 AGE RELATED OSTEOPOROSIS, UNSPECIFIED PATHOLOGICAL FRACTURE PRESENCE: ICD-10-CM

## 2019-08-14 DIAGNOSIS — K52.9 COLITIS: ICD-10-CM

## 2019-08-14 DIAGNOSIS — Z78.9 TAKES DIETARY SUPPLEMENTS: ICD-10-CM

## 2019-08-14 DIAGNOSIS — I42.9 CARDIOMYOPATHY, UNSPECIFIED TYPE (H): Primary | ICD-10-CM

## 2019-08-14 DIAGNOSIS — Z86.711 HISTORY OF PULMONARY EMBOLISM: ICD-10-CM

## 2019-08-14 DIAGNOSIS — M81.0 OSTEOPOROSIS: ICD-10-CM

## 2019-08-14 DIAGNOSIS — G25.81 RESTLESS LEG SYNDROME: ICD-10-CM

## 2019-08-14 PROCEDURE — 99605 MTMS BY PHARM NP 15 MIN: CPT | Performed by: PHARMACIST

## 2019-08-14 PROCEDURE — 99607 MTMS BY PHARM ADDL 15 MIN: CPT | Performed by: PHARMACIST

## 2019-08-14 NOTE — PROGRESS NOTES
SUBJECTIVE/OBJECTIVE:                Helena Brown is a 79 year old female called for a transitions of care visit.    She was discharged from [facility] on 8/13/19 for procedure to update cardiac pacemaker.     Chief Complaint: Hospital f/u comprehensive medication review and education.     Allergies/ADRs: Reviewed in RumgrW and Epic  Tobacco: History of tobacco dependence - quit 1960   Alcohol: None currently, but typically 7 glasses of wine / week  PMH: Reviewed in eCW and Epic    Medication Adherence/Access:  Self-manages medications   Patient takes medications directly from bottles.  Patient takes medications 2-3 time(s) per day.   Per patient, misses medication pretty rarely, maybe 1-2 times per month.     Cardiomyopathy with LBBB-s/p upgrade to pacemaker 8/12/19: Current medications include:  Cephalexin 500 mg   Acetaminophen 500 mg 2 tabs at bedtime   Incision healing well, only a little discomfort but not bothersome.   A little redness at incision site post-op, gave 3 doses IV abx and 7 days oral on discharge.  Taken 2 doses of cephalexin since discharge and tolerated well.   Reports metoprolol was stopped by cardiology. Appears it was initially held 6/26 by Dr. Tom due to hypotension.   Previously also on lisinopril but also stopped by Dr. Babcock for hypotension.    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 PE's occurred shortly after initial pacemaker insertion, 3/29/19. DOAC initially ordered but high cost. Warfarin not interrupted for procedure. She did miss one dose on 8/8 prior to procedure.Takes warfarin dose in PM  Denies any s/s of significant bruising or bleeding.   Potential interactions: Meds: Pepto-Bismol, avoids NSAIDs   Diet: eats a lot of green veggies and fruits, isn't sure what all has vitamin K in it  We discussed importance of consistency with diet rather than  avoiding vit K foods.   INR at discharge: 1.97   Next INR: 8/16/19    RLS: Current medications include:   Gabapentin 300-600 mg at bedtime   Magnesium oxide 250 mg at bedtime    This regimen works well for her.   Typically takes 1 gabapentin at bedtime but may wake up in the middle of the night and take another dose.  Denies experiencing dizziness, ataxia, or morning grogginess with gabapentin.   Does get diarrhea but attributes this to colitis.    Last Mg, 3/28/19: 2.2     Osteoporosis, Supplements: Currently medications include:  Calcium 500 mg 1 tab twice a day  Vitamin D3 1000 unit 1 tab daily  Vitamin B12 100 mcg 1/4 tab every other day  Krill oil 1 capsule daily  Eat a lot of calcium-containing foods. Also eats a lot of vegetables.   Son has some knowledge of supplements and provides advice on what to take.   Last Dexa: 9/18/18       Colitis: Current medications include: Pepto Bismol 3 chews tid   Uses this during colitis flares with good effect.   Been doing this for some time and it works well for her.     Today's Vitals: There were no vitals taken for this visit.-telephone encounter     ASSESSMENT:                 Current medications were reviewed today.      Medication Adherence: good, no issues identified    Cardiomyopathy with LBBB-s/p upgrade to pacemaker 8/12/19: Stable.   BP is soft but given significant CV risks she would still likely benefit from low-dose beta-blocker if tolerated.  Given h/o colitis recommend cephalexin be taken with food.     H/o PE on Anticoagulation: Needs further monitoring.   INR slightly subtherapeutic at discharge likely due to missing dose on 8/8.  INR since initiation mostly therapeutic w/o s/s of bleeding.     She would benefit from information on dietary interactions.     RLS: Stable.   She may benefit from switching Mg oxide to Mg gluconate in setting of diarrhea with colitis.     Osteoporosis, Supplements: Needs further assessment.   Based on her last Dexa consistent  with osteoporosis she would benefit from pharmacologic therapy.   Oral bisphosphonate may not be a good option given GI risks with anticoagulation and colitis.   Reclast may be a good initial option for convenience and opportunity for drug holiday.   Prolia is also an option but requires indefinite tx.   Forteo x2 years prior to Reclast could also be considered but high cost may be a barrier.     Colitis: Needs further monitoring.   Risk of bleeding, particularly gastrointestinal, may be increased by coadministration of warfarin with bismuth susalicylate.   Recommend minimizing use of Pepto-Bismol as able.       PLAN:                Post Discharge Medication Reconciliation Status: discharge medications reconciled and changed, per note/orders (see AVS).    1. Recommend taking cephalexin dose with food   2. Recommend considering restarting low-dose beta-blocker if tolerated  3.. Provided patient with list of vitamin K containing foods and education on maintaining consistent diet with warfarin   4. Recommend switching Mg oxide to Mg gluconate   5. Recommend considering pharmacologic therapy with Reclast or Prolia for osteoporosis if patient agreeable  6. Recommend minimizing use of Peto-Bismol as able while on warfarin    I spent 60 minutes with this patient today. I offer these suggestions for consideration by Dr. Welsh. A copy of the visit note was provided to the patient's primary care provider.    Will follow up as needed at patient or provider's request.    The patient was mailed a summary of these recommendations as an after visit summary.    Adalgisa Stark, PharmD  Medication Therapy Management Pharmacist  [facility]  Pager: [phone]

## 2019-08-16 ENCOUNTER — COMMUNICATION - HEALTHEAST (OUTPATIENT)
Dept: ANTICOAGULATION | Facility: CLINIC | Age: 79
End: 2019-08-16

## 2019-08-16 ENCOUNTER — AMBULATORY - HEALTHEAST (OUTPATIENT)
Dept: CARDIOLOGY | Facility: CLINIC | Age: 79
End: 2019-08-16

## 2019-08-16 DIAGNOSIS — Z51.81 ENCOUNTER FOR THERAPEUTIC DRUG MONITORING: ICD-10-CM

## 2019-08-16 DIAGNOSIS — Z95.0 CARDIAC PACEMAKER IN SITU: ICD-10-CM

## 2019-08-16 LAB — POC INR - HE - HISTORICAL: 2.2 (ref 0.9–1.1)

## 2019-08-16 RX ORDER — CEPHALEXIN 500 MG/1
500 CAPSULE ORAL 3 TIMES DAILY
COMMUNITY
Start: 2019-08-13 | End: 2019-08-20

## 2019-08-16 RX ORDER — WARFARIN SODIUM 3 MG/1
TABLET ORAL
COMMUNITY
Start: 2019-04-27 | End: 2021-08-30 | Stop reason: ALTCHOICE

## 2019-08-16 RX ORDER — ACETAMINOPHEN 500 MG
500-1000 TABLET ORAL EVERY 6 HOURS PRN
Status: ON HOLD | COMMUNITY
Start: 2011-11-02 | End: 2022-07-07

## 2019-08-16 RX ORDER — BISMUTH SUBSALICYLATE 262 MG/1
3 TABLET, CHEWABLE ORAL 3 TIMES DAILY PRN
COMMUNITY
End: 2022-07-04

## 2019-08-16 RX ORDER — GABAPENTIN 300 MG/1
300 CAPSULE ORAL AT BEDTIME
COMMUNITY

## 2019-08-16 ASSESSMENT — MIFFLIN-ST. JEOR: SCORE: 915.4

## 2019-08-18 NOTE — PATIENT INSTRUCTIONS
Recommendations from today's MTM visit:                                                    MTM (medication therapy management) is a service provided by a clinical pharmacist designed to help you get the most of out of your medicines. Today we reviewed what your medicines are for, how to know if they are working, that your medicines are safe and how to make your medicine regimen as easy as possible.     1. Take cephalexin with food to minimize stomach upset     2. Foods with vitamin K interact with your warfarin medicine. Eating a consistent amount of these foods every week can help keep your INR level in the goal range between 2-3. I included a list of foods with vitamin K to help you identify these in your diet.     3. We talked about how the Magnesium OXIDE form you take can cause or worsen diarrhea. With your colitis I recommend switching to a different form like Magnesium CITRATE, Magnesium GLYCINATE or Magnesium CHLORIDE to avoid worsening your colitis symptoms.     4. An ingredient in Pepto-Bismol can increase the risk of a stomach bleed when you are also taking warfarin. I recommend trying to reduce the amount of Pepto-Bismol you take if possible to avoid this risk.     5. We talked about the results from your last bone density scan showing osteoporosis. There are medications that help rebuild bone and prevent more bone loss. I recommend talking to Dr. Welsh about these medications.      It was great to speak with you today.  I value your experience and would be very thankful for your time with providing feedback on our clinic survey. You may receive a survey via email or text message in the next few days.     Next MTM visit: As needed per your or Dr. Welsh' request    To schedule another MTM appointment, please call the clinic directly or you may call the MTM scheduling line at [phone] or toll-free at [phone].     My Clinical Pharmacist's contact information:                                                       It was a pleasure talking with you today!  Please feel free to contact me with any questions or concerns you have.      Adalgisa Stark, PharmD  Medication Therapy Management Pharmacist  [facility]  Pager: [phone]                        Patient Education     What to Know When Taking Warfarin  Warfarin is a medicine that controls how your blood clots. It is used to help prevent blood clots that may cause serious health problems. These problems include heart attack (myocardial infarction), stroke, a blockage in an artery or vein (thrombus), or a blood clot that travels to the lungs (pulmonary embolism).    Before you start taking this medicine, tell your healthcare provider if you have any of these conditions:    Stomach ulcer now or in the past    Vomited blood or had bloody stools (black or red color)    Aneurysm, pericarditis, or pericardial effusion    Blood disorder    Recent surgery, stroke, mini-stroke, or spinal puncture    Kidney or liver disease, uncontrolled high blood pressure, diabetes, vasculitis, heart failure, lupus or other collagen-vascular disease, or high cholesterol    You are pregnant or breastfeeding    You are younger than 18 years old    Recent or planned dental procedure  Although warfarin reduces the risk for blood clots, it increases your risk of bleeding. Because of this, you must take the medicine exactly as you are told by your healthcare provider.   You will have blood tests often to check the level of warfarin in your blood. It is important to have just the right amount to balance preventing blood clots and causing excessive bleeding. If the level is too low, you are at risk for developing a blood clot. If it's too high, you are at risk for bleeding. Make sure you keep all scheduled appointments for blood testing. If you don t, you will be at risk for bleeding problems. You also need to protect yourself from injury that may cause bleeding such as a  fall or hitting your head.  Follow these tips    Take this medicine at the same time each day. Take it with a full glass of water, with or without food.    Make sure you know what to do if you miss a dose. If you are not sure what to do, call your healthcare provider or pharmacist. Don't take more warfarin than you were told to.    Tell all of your providers including your dentist that you take warfarin. If you will be taking warfarin for quite a while, carry an ID card or get a Medic-Alert bracelet. This will alert medical staff in case you aren t able to do so yourself. Also carry with you the name and number of the person to contact in case of an emergency.    Keep your appointments for regular blood tests to measure the effects of warfarin. Your healthcare provider may need to change your dose based on the results of your blood test. Follow your provider s advice exactly about how to take this medicine.    Don't stop taking the medicine without talking with your provider.     Monitoring your PT/INR blood levels  You will need to have a blood test called a PT/INR regularly. PT stands for pro thrombin. INR stands for international normalized ratio. The PT/INR blood test is done to make sure you are getting the right dose of this medicine. The PT/INR test tells your healthcare provider how your blood is clotting. Prothrombin time, commonly referred to as pro time or PT, measures the time it takes for blood to clot. International normalized ratio or INR is a way to compare results of the PT tests done at different labs.    Go for your blood (PT/INR) tests as often as directed. Note that diet and medicines can affect your PT/INR level.    Your next PT/INR blood draw is due on _________________ (date) at ________________ (time) by ____________________ (name of healthcare provider or clinic).    The name of the healthcare provider who is monitoring your anticoagulation therapy is ______________________ and the phone  number is ___________________.    Follow up with your healthcare provider or as advised by his or her staff. It usually takes a few hours for your doctor to get the results of your clotting tests. Call to get your lab results and find out if your provider needs to make further changes to your warfarin dose.    If your labs (PT/INR) are drawn at a location other than your provider's office, remember to tell your provider as soon as you get your lab results.      What to do at home  1. Adjust your warfarin dose as directed by your healthcare provider, ____________________ (name of healthcare provider).  2. Have your blood clotting test done every _______ days at _____________________ (name of clinic) by ____________________ (name of healthcare provider).  3. Prevent injuries and bleeding:  ? Don't go barefoot. Always wear shoes.  ? Don't trim corns or calluses yourself.  ? Use an electric razor to shave instead of a manual one.  ? Use a soft-bristled toothbrush and waxed dental floss.  4. Some medicines can affect your blood clotting. Always talk with your healthcare provider before stopping, starting, or changing any prescription or over-the-counter (OTC) medicines. This includes herbal medicines and vitamins. Talk with your healthcare provider about the following medicines:  ? Some antibiotics. This is critical because some common antibiotics can cause severe bleeding if you take them along with warfarin. These antibiotics include ciprofloxacin and trimethoprim-sulfamethoxazole.   ? Some heart medicines  ? Cimetidine  ? Aspirin or other anti-inflammatory medicines, such as ibuprofen, naproxen, ketoprofen, or other arthritis medicines  ? Some medicines for depression, cancer, HIV (protease inhibitors), diabetes, seizures, gout, high cholesterol, or thyroid replacement  ? Vitamins containing Vitamin K  ? Herbal products such as ginkgo, Q10, garlic, or Sarahi's wort  5. Don't make major changes in your diet.  Consuming high amounts of foods containing vitamin K can reduce the effectiveness of your warfarin.      Keep your diet steady  Keep your diet pretty much the same each day. That s because many foods contain vitamin K. Vitamin K helps your blood clot. So eating overly high amounts of foods that contain vitamin K can affect the way warfarin works. You don t need to stay away from foods that have vitamin K. But keep the amount of them you eat steady (about the same day to day). If you change your diet for any reason, such as for illness or to lose weight, tell your healthcare provider.    Foods that have vitamin K include asparagus, avocado, broccoli, cabbage, kale, spinach, and some other leafy green vegetables. Oils such as soybean, canola, and olive oils also contain vitamin K.    Other food products can affect the way warfarin works in your body:  ? Food products that may affect blood clotting include cranberries and cranberry juice, fish oil supplements, garlic, ashley, licorice, and turmeric.  ? Herbs used in herbal teas or supplements can also affect blood clotting. Keep the amount of herbal teas and supplements you use steady.  ? Alcohol can increase the effect of warfarin in your body.  Talk with your healthcare provider if you have concerns about these or other food products and their effects on warfarin.  When to call your healthcare provider  Warfarin increases your risk of bleeding. Call your healthcare provider right away before you take your next dose of warfarin if you have any of these problems:    Bleeding that doesn t stop in 10 minutes. This might be from a bloody nose or a cut, for example.    A heavier-than-normal period or bleeding between periods    Coughing or throwing up blood or something that looks like coffee grounds    Nausea, bloating, or diarrhea    Bleeding hemorrhoids    Dark red or brown urine    Red or black tarry stools    Red or black-and-blue marks on the skin that get  larger    A fever or an illness that gets worse    Dizziness, headache, weakness, or fatigue    Chest pain or trouble breathing    A serious fall or a blow to the head    Swelling or pain after an injury or at an injection site    Bleeding gums after brushing your teeth  What to watch for  If you have any of the following allergic reactions, call your doctor right away or go to the hospital.    Rash    Itching    Swelling    Trouble swallowing or breathing  [NOTE: This information topic may not include all directions, precautions, medical conditions, medicine/food interactions, and warnings for this medicine. Check with your doctor, nurse, or pharmacist for any questions that you may have.]  Date Last Reviewed: 6/1/2016 2000-2018 [employer]. [address] [place] [address], [place] [address]. All rights reserved. This information is not intended as a substitute for professional medical care. Always follow your healthcare professional's instructions.           Patient Education     Living with Osteoporosis: Preventing Fractures  If you have osteoporosis, you can do a lot to reduce its effect on your life. Knowing how to prevent fractures and spinal curvature can help you live more comfortably and safely with this disease.    Reducing your risk for fractures  The most common fracture sites in people with osteoporosis are the wrist, spine, and hip. These fractures are often caused by accidents and falls. All fractures are painful and may limit what you can do. But hip fractures are very serious. They often need surgery, and it can take months to recover. To reduce your risk for fractures:    Get regular exercise. Try walking, swimming, or weight training.    Eat foods that are rich in calcium, or take calcium supplements.    Make your home safe to help avoid accidents.    Take extra precautions not to fall in risky areas, such as icy sidewalks.  Understanding spinal fractures  Your spine is made up of many  bones called vertebrae. Osteoporosis can cause the vertebrae in your spine to collapse. As a result, your upper back may arch forward, creating a curvature. Spine fractures may also result from back strain and bad posture. You will also lose height. Your lower spine must then adjust to keep your body balanced. This can cause back pain. To prevent or lessen these spinal changes:    Practice good posture.    Use proper techniques if you need to lift heavy objects.    Do back exercises to help your posture.    Lie on your back when you have pain.    Ask your healthcare provider about these and other ways to help your spine.  Date Last Reviewed: 5/1/2018 2000-2018 [employer]. [address] [place] [address], [place] [address]. All rights reserved. This information is not intended as a substitute for professional medical care. Always follow your healthcare professional's instructions.           Patient Education     Living with Osteoporosis: Regular Exercise  If you have osteoporosis, exercise is vital for your health. It can prevent bone fractures and spine changes. It will slow bone loss. Exercise will strengthen your body. It can also be fun. A variety of exercises is best. See below for exercises that can help you. But before you start, talk with your healthcare provider to be sure these exercises are right for you.    Resistance exercises. These build muscle strength and maintain bone mass. They also make you less prone to injury. Exercises include lifting small weights, doing push-ups and sit-ups, using elastic exercise bands, and using weight machines.  Weight-bearing activities. These help your whole body. They also help you maintain bone mass. Activities include walking, dancing, and housework.  Non-weight-bearing exercises. These help prevent back strain and pain. They do this by building the trunk and leg muscles. Exercises that help with flexibility can prevent falls. Examples include swimming, water  exercise, and stretching.  Staying safe  Here are tips to stay safe:     Always check with your healthcare provider before starting any new exercise program.    Use weights only as instructed.    Stop any exercise that causes pain.   Date Last Reviewed: 5/1/2018 2000-2018 [employer]. [address] [place] [address]. All rights reserved. This information is not intended as a substitute for professional medical care. Always follow your healthcare professional's instructions.           Patient Education     Preventing Osteoporosis: Meeting Your Calcium Needs    Your body needs calcium to build and repair bones. But it can't make calcium on its own. That's why it's important to eat calcium-rich foods. Some foods are naturally rich in calcium. Others have calcium added (fortified). It's best to get calcium from the foods you eat. But if you can't get enough, you may want to take calcium supplements. To meet your daily calcium needs, try the foods listed below.  Dairy Fish & beans Other sources   Source   Calcium (mg) per serving   Source   Calcium (mg) per serving   Source   Calcium (mg) per serving   Low-fat yogurt, plain   415 mg/8 oz.   Sardines, [place], canned, with bones   351 mg/3 oz.   Oatmeal, instant, fortified   215 mg/1 cup   Nonfat milk   302 mg/1 cup   [place], sockeye, canned, with bones   239 mg/3 oz.   Tofu made with calcium sulfate   204 mg/3 oz.   Low-fat milk   297 mg/1 cup   Soybeans, fresh, boiled   131 mg/1/2 cup   Collards   179 mg/1/2 cup   Swiss cheese   272 mg/1 oz.   White beans, cooked   81 mg/1/2 cup   English muffin, whole wheat   175 mg/1 muffin   Cheddar cheese   205 mg/1 oz.   Navy beans, cooked   79 mg/1/2 cup   Kale   90 mg/1/2 cup   Ice cream strawberry   79 mg/1/2 cup           Orange, navel   56 mg/1 medium   Note: Calcium levels may vary depending on brand and size.  Daily calcium needs  14 to 18 years old: 1,300 mg  19 to 30 years old: 1,000  mg  31 to 50 years old: 1,000 mg  51 to 70 years old, women: 1,200 mg  51 to 70 years old, men: 1,000 mg  Pregnant or nursin to 18 years old: 1,300 mg, 19 to 50 years old: 1,000 mg  Older than 70 (women and men): 1,200 mg   Date Last Reviewed: 2018-2018 [employer]. [address] [place] [address]. All rights reserved. This information is not intended as a substitute for professional medical care. Always follow your healthcare professional's instructions.

## 2019-08-19 ENCOUNTER — AMBULATORY - HEALTHEAST (OUTPATIENT)
Dept: CARDIOLOGY | Facility: CLINIC | Age: 79
End: 2019-08-19

## 2019-08-19 ENCOUNTER — OFFICE VISIT - HEALTHEAST (OUTPATIENT)
Dept: CARDIOLOGY | Facility: CLINIC | Age: 79
End: 2019-08-19

## 2019-08-19 DIAGNOSIS — R93.1 DECREASED CARDIAC EJECTION FRACTION: ICD-10-CM

## 2019-08-19 DIAGNOSIS — I50.21 ACUTE HFREF (HEART FAILURE WITH REDUCED EJECTION FRACTION) (H): ICD-10-CM

## 2019-08-19 DIAGNOSIS — Z95.0 CARDIAC PACEMAKER IN SITU: ICD-10-CM

## 2019-08-19 ASSESSMENT — MIFFLIN-ST. JEOR: SCORE: 917.67

## 2019-08-28 ENCOUNTER — COMMUNICATION - HEALTHEAST (OUTPATIENT)
Dept: CARDIOLOGY | Facility: CLINIC | Age: 79
End: 2019-08-28

## 2019-08-30 ENCOUNTER — RECORDS - HEALTHEAST (OUTPATIENT)
Dept: ADMINISTRATIVE | Facility: OTHER | Age: 79
End: 2019-08-30

## 2019-09-06 ENCOUNTER — RECORDS - HEALTHEAST (OUTPATIENT)
Dept: ADMINISTRATIVE | Facility: OTHER | Age: 79
End: 2019-09-06

## 2019-09-13 ENCOUNTER — AMBULATORY - HEALTHEAST (OUTPATIENT)
Dept: CARDIOLOGY | Facility: CLINIC | Age: 79
End: 2019-09-13

## 2019-09-13 DIAGNOSIS — Z95.0 CARDIAC PACEMAKER IN SITU: ICD-10-CM

## 2019-09-26 ENCOUNTER — AMBULATORY - HEALTHEAST (OUTPATIENT)
Dept: CARDIOLOGY | Facility: CLINIC | Age: 79
End: 2019-09-26

## 2019-09-26 ENCOUNTER — RECORDS - HEALTHEAST (OUTPATIENT)
Dept: ADMINISTRATIVE | Facility: OTHER | Age: 79
End: 2019-09-26

## 2019-10-01 ENCOUNTER — OFFICE VISIT - HEALTHEAST (OUTPATIENT)
Dept: CARDIOLOGY | Facility: CLINIC | Age: 79
End: 2019-10-01

## 2019-10-01 DIAGNOSIS — I44.2 COMPLETE AV BLOCK (H): ICD-10-CM

## 2019-10-01 DIAGNOSIS — Z95.0 CARDIAC PACEMAKER IN SITU: ICD-10-CM

## 2019-10-01 DIAGNOSIS — I44.7 LBBB (LEFT BUNDLE BRANCH BLOCK): ICD-10-CM

## 2019-10-01 DIAGNOSIS — I44.7 LEFT BUNDLE BRANCH BLOCK (LBBB): ICD-10-CM

## 2019-10-01 DIAGNOSIS — I26.99 PULMONARY EMBOLISM WITHOUT ACUTE COR PULMONALE, UNSPECIFIED CHRONICITY, UNSPECIFIED PULMONARY EMBOLISM TYPE (H): ICD-10-CM

## 2019-10-01 DIAGNOSIS — I42.8 NON-ISCHEMIC CARDIOMYOPATHY (H): ICD-10-CM

## 2019-10-01 ASSESSMENT — MIFFLIN-ST. JEOR: SCORE: 926.74

## 2019-10-03 ENCOUNTER — RECORDS - HEALTHEAST (OUTPATIENT)
Dept: ADMINISTRATIVE | Facility: OTHER | Age: 79
End: 2019-10-03

## 2019-10-24 ENCOUNTER — OFFICE VISIT - HEALTHEAST (OUTPATIENT)
Dept: PULMONOLOGY | Facility: OTHER | Age: 79
End: 2019-10-24

## 2019-10-24 DIAGNOSIS — I26.99 OTHER ACUTE PULMONARY EMBOLISM WITHOUT ACUTE COR PULMONALE (H): ICD-10-CM

## 2019-10-24 ASSESSMENT — MIFFLIN-ST. JEOR: SCORE: 922.21

## 2019-11-15 ENCOUNTER — AMBULATORY - HEALTHEAST (OUTPATIENT)
Dept: CARDIOLOGY | Facility: CLINIC | Age: 79
End: 2019-11-15

## 2019-11-15 ENCOUNTER — RECORDS - HEALTHEAST (OUTPATIENT)
Dept: ADMINISTRATIVE | Facility: OTHER | Age: 79
End: 2019-11-15

## 2019-11-20 ENCOUNTER — AMBULATORY - HEALTHEAST (OUTPATIENT)
Dept: CARDIOLOGY | Facility: CLINIC | Age: 79
End: 2019-11-20

## 2019-11-20 DIAGNOSIS — I45.9 HEART BLOCK: ICD-10-CM

## 2019-11-20 DIAGNOSIS — Z95.0 CARDIAC PACEMAKER IN SITU: ICD-10-CM

## 2019-11-20 DIAGNOSIS — I44.2 COMPLETE AV BLOCK (H): ICD-10-CM

## 2019-11-20 DIAGNOSIS — I44.7 LBBB (LEFT BUNDLE BRANCH BLOCK): ICD-10-CM

## 2019-11-20 DIAGNOSIS — R93.1 DECREASED CARDIAC EJECTION FRACTION: ICD-10-CM

## 2019-11-20 ASSESSMENT — MIFFLIN-ST. JEOR: SCORE: 928.1

## 2020-02-19 ENCOUNTER — AMBULATORY - HEALTHEAST (OUTPATIENT)
Dept: CARDIOLOGY | Facility: CLINIC | Age: 80
End: 2020-02-19

## 2020-02-19 ENCOUNTER — COMMUNICATION - HEALTHEAST (OUTPATIENT)
Dept: CARDIOLOGY | Facility: CLINIC | Age: 80
End: 2020-02-19

## 2020-02-19 DIAGNOSIS — Z95.0 CARDIAC PACEMAKER IN SITU: ICD-10-CM

## 2020-02-19 DIAGNOSIS — I44.2 COMPLETE AV BLOCK (H): ICD-10-CM

## 2020-02-20 ENCOUNTER — AMBULATORY - HEALTHEAST (OUTPATIENT)
Dept: CARDIOLOGY | Facility: CLINIC | Age: 80
End: 2020-02-20

## 2020-05-21 ENCOUNTER — AMBULATORY - HEALTHEAST (OUTPATIENT)
Dept: CARDIOLOGY | Facility: CLINIC | Age: 80
End: 2020-05-21

## 2020-05-21 DIAGNOSIS — Z95.0 CARDIAC PACEMAKER IN SITU: ICD-10-CM

## 2020-05-21 DIAGNOSIS — I44.7 LBBB (LEFT BUNDLE BRANCH BLOCK): ICD-10-CM

## 2020-05-21 DIAGNOSIS — I45.9 HEART BLOCK: ICD-10-CM

## 2020-05-21 DIAGNOSIS — I44.2 COMPLETE AV BLOCK (H): ICD-10-CM

## 2020-05-22 ENCOUNTER — OFFICE VISIT - HEALTHEAST (OUTPATIENT)
Dept: CARDIOLOGY | Facility: CLINIC | Age: 80
End: 2020-05-22

## 2020-05-22 DIAGNOSIS — I44.7 LBBB (LEFT BUNDLE BRANCH BLOCK): ICD-10-CM

## 2020-05-22 DIAGNOSIS — Z95.0 CARDIAC PACEMAKER IN SITU: ICD-10-CM

## 2020-05-22 DIAGNOSIS — I45.9 HEART BLOCK: ICD-10-CM

## 2020-05-22 DIAGNOSIS — I44.2 COMPLETE AV BLOCK (H): ICD-10-CM

## 2020-07-23 ENCOUNTER — RECORDS - HEALTHEAST (OUTPATIENT)
Dept: LAB | Facility: CLINIC | Age: 80
End: 2020-07-23

## 2020-07-23 LAB
ALBUMIN SERPL-MCNC: 3.8 G/DL (ref 3.5–5)
ALP SERPL-CCNC: 66 U/L (ref 45–120)
ALT SERPL W P-5'-P-CCNC: 18 U/L (ref 0–45)
ANION GAP SERPL CALCULATED.3IONS-SCNC: 9 MMOL/L (ref 5–18)
AST SERPL W P-5'-P-CCNC: 24 U/L (ref 0–40)
BILIRUB SERPL-MCNC: 0.3 MG/DL (ref 0–1)
BUN SERPL-MCNC: 19 MG/DL (ref 8–28)
CALCIUM SERPL-MCNC: 9.6 MG/DL (ref 8.5–10.5)
CHLORIDE BLD-SCNC: 102 MMOL/L (ref 98–107)
CHOLEST SERPL-MCNC: 247 MG/DL
CO2 SERPL-SCNC: 27 MMOL/L (ref 22–31)
CREAT SERPL-MCNC: 0.82 MG/DL (ref 0.6–1.1)
ERYTHROCYTE [SEDIMENTATION RATE] IN BLOOD BY WESTERGREN METHOD: 12 MM/HR (ref 0–20)
FASTING STATUS PATIENT QL REPORTED: ABNORMAL
GFR SERPL CREATININE-BSD FRML MDRD: >60 ML/MIN/1.73M2
GLUCOSE BLD-MCNC: 95 MG/DL (ref 70–125)
HDLC SERPL-MCNC: 79 MG/DL
LDLC SERPL CALC-MCNC: 149 MG/DL
POTASSIUM BLD-SCNC: 5.3 MMOL/L (ref 3.5–5)
PROT SERPL-MCNC: 7 G/DL (ref 6–8)
SODIUM SERPL-SCNC: 138 MMOL/L (ref 136–145)
TRIGL SERPL-MCNC: 97 MG/DL

## 2020-07-24 LAB
25(OH)D3 SERPL-MCNC: 44 NG/ML (ref 30–80)
VIT B12 SERPL-MCNC: 428 PG/ML (ref 213–816)

## 2020-08-17 ENCOUNTER — AMBULATORY - HEALTHEAST (OUTPATIENT)
Dept: CARDIOLOGY | Facility: CLINIC | Age: 80
End: 2020-08-17

## 2020-08-17 DIAGNOSIS — E78.5 HYPERLIPIDEMIA LDL GOAL <100: ICD-10-CM

## 2020-08-24 ENCOUNTER — HOSPITAL ENCOUNTER (OUTPATIENT)
Dept: CARDIOLOGY | Facility: CLINIC | Age: 80
Discharge: HOME | End: 2020-08-24
Attending: INTERNAL MEDICINE

## 2020-08-24 DIAGNOSIS — I44.7 LBBB (LEFT BUNDLE BRANCH BLOCK): ICD-10-CM

## 2020-08-24 DIAGNOSIS — Z95.0 CARDIAC PACEMAKER IN SITU: ICD-10-CM

## 2020-08-24 DIAGNOSIS — I45.9 HEART BLOCK: ICD-10-CM

## 2020-08-24 LAB
BSA FOR ECHO PROCEDURE: 1.48 M2
CV BLOOD PRESSURE: ABNORMAL MMHG
CV ECHO HEIGHT: 62 IN
CV ECHO WEIGHT: 111 LBS
DESCENDING AORTA: 3 CM
DOP CALC LVOT AREA: 3.14 CM2
DOP CALC LVOT DIAMETER: 2 CM
DOP CALC LVOT PEAK VEL: 117 CM/S
DOP CALC LVOT STROKE VOLUME: 71.3 CM3
DOP CALCLVOT PEAK VEL VTI: 22.7 CM
EJECTION FRACTION: 53 % (ref 55–75)
FRACTIONAL SHORTENING: 34.5 % (ref 28–44)
INTERVENTRICULAR SEPTUM IN END DIASTOLE: 0.57 CM (ref 0.6–0.9)
IVS/PW RATIO: 0.8
LA AREA 1: 16 CM2
LA AREA 2: 8.93 CM2
LEFT ATRIUM LENGTH: 4.37 CM
LEFT ATRIUM VOLUME INDEX: 18.8 ML/M2
LEFT ATRIUM VOLUME: 27.8 ML
LEFT VENTRICLE CARDIAC INDEX: 3.9 L/MIN/M2
LEFT VENTRICLE CARDIAC OUTPUT: 5.8 L/MIN
LEFT VENTRICLE DIASTOLIC VOLUME INDEX: 44.6 CM3/M2 (ref 29–61)
LEFT VENTRICLE DIASTOLIC VOLUME: 66 CM3 (ref 46–106)
LEFT VENTRICLE HEART RATE: 82 BPM
LEFT VENTRICLE MASS INDEX: 49.1 G/M2
LEFT VENTRICLE SYSTOLIC VOLUME INDEX: 20.9 CM3/M2 (ref 8–24)
LEFT VENTRICLE SYSTOLIC VOLUME: 31 CM3 (ref 14–42)
LEFT VENTRICULAR INTERNAL DIMENSION IN DIASTOLE: 4.09 CM (ref 3.8–5.2)
LEFT VENTRICULAR INTERNAL DIMENSION IN SYSTOLE: 2.68 CM (ref 2.2–3.5)
LEFT VENTRICULAR MASS: 72.7 G
LEFT VENTRICULAR OUTFLOW TRACT MEAN GRADIENT: 3 MMHG
LEFT VENTRICULAR OUTFLOW TRACT MEAN VELOCITY: 75 CM/S
LEFT VENTRICULAR OUTFLOW TRACT PEAK GRADIENT: 5 MMHG
LEFT VENTRICULAR POSTERIOR WALL IN END DIASTOLE: 0.71 CM (ref 0.6–0.9)
LV STROKE VOLUME INDEX: 48.2 ML/M2
MITRAL VALVE E/A RATIO: 0.6
MV AVERAGE E/E' RATIO: 5.2 CM/S
MV DECELERATION TIME: 412 MS
MV E'TISSUE VEL-LAT: 10 CM/S
MV E'TISSUE VEL-MED: 4.58 CM/S
MV LATERAL E/E' RATIO: 3.8
MV MEDIAL E/E' RATIO: 8.3
MV PEAK A VELOCITY: 58.5 CM/S
MV PEAK E VELOCITY: 38 CM/S
NUC REST DIASTOLIC VOLUME INDEX: 1776 LBS
NUC REST SYSTOLIC VOLUME INDEX: 62 IN
PR MAX PG: 4 MMHG
PR PEAK VELOCITY: 103 CM/S
TRICUSPID REGURGITATION PEAK PRESSURE GRADIENT: 17.5 MMHG
TRICUSPID VALVE ANULAR PLANE SYSTOLIC EXCURSION: 1.6 CM
TRICUSPID VALVE PEAK REGURGITANT VELOCITY: 209 CM/S

## 2020-08-24 ASSESSMENT — MIFFLIN-ST. JEOR: SCORE: 926.74

## 2020-08-25 ENCOUNTER — RECORDS - HEALTHEAST (OUTPATIENT)
Dept: ADMINISTRATIVE | Facility: OTHER | Age: 80
End: 2020-08-25

## 2020-08-25 ENCOUNTER — AMBULATORY - HEALTHEAST (OUTPATIENT)
Dept: CARDIOLOGY | Facility: CLINIC | Age: 80
End: 2020-08-25

## 2020-08-27 ENCOUNTER — COMMUNICATION - HEALTHEAST (OUTPATIENT)
Dept: CARDIOLOGY | Facility: CLINIC | Age: 80
End: 2020-08-27

## 2020-08-28 ENCOUNTER — AMBULATORY - HEALTHEAST (OUTPATIENT)
Dept: CARDIOLOGY | Facility: CLINIC | Age: 80
End: 2020-08-28

## 2020-08-28 ENCOUNTER — COMMUNICATION - HEALTHEAST (OUTPATIENT)
Dept: CARDIOLOGY | Facility: CLINIC | Age: 80
End: 2020-08-28

## 2020-08-28 DIAGNOSIS — Z95.0 CARDIAC PACEMAKER IN SITU: ICD-10-CM

## 2020-08-28 DIAGNOSIS — I44.2 COMPLETE AV BLOCK (H): ICD-10-CM

## 2020-08-28 DIAGNOSIS — I44.7 LBBB (LEFT BUNDLE BRANCH BLOCK): ICD-10-CM

## 2020-08-28 LAB
ERYTHROCYTE [DISTWIDTH] IN BLOOD BY AUTOMATED COUNT: 13.4 % (ref 11–14.5)
HCC DEVICE COMMENTS: NORMAL
HCC DEVICE IMPLANTING PROVIDER: NORMAL
HCC DEVICE MANUFACTURE: NORMAL
HCC DEVICE MODEL: NORMAL
HCC DEVICE SERIAL NUMBER: NORMAL
HCC DEVICE TYPE: NORMAL
HCT VFR BLD AUTO: 37.3 % (ref 35–47)
HGB BLD-MCNC: 12.1 G/DL (ref 12–16)
MCH RBC QN AUTO: 29.7 PG (ref 27–34)
MCHC RBC AUTO-ENTMCNC: 32.4 G/DL (ref 32–36)
MCV RBC AUTO: 91 FL (ref 80–100)
PLATELET # BLD AUTO: 174 THOU/UL (ref 140–440)
PMV BLD AUTO: 9.6 FL (ref 8.5–12.5)
RBC # BLD AUTO: 4.08 MILL/UL (ref 3.8–5.4)
WBC: 5.5 THOU/UL (ref 4–11)

## 2020-08-28 ASSESSMENT — MIFFLIN-ST. JEOR: SCORE: 926.74

## 2020-09-29 ENCOUNTER — OFFICE VISIT - HEALTHEAST (OUTPATIENT)
Dept: RHEUMATOLOGY | Facility: CLINIC | Age: 80
End: 2020-09-29

## 2020-09-29 DIAGNOSIS — M15.0 PRIMARY OSTEOARTHRITIS INVOLVING MULTIPLE JOINTS: ICD-10-CM

## 2020-09-29 DIAGNOSIS — M25.50 POLYARTHRALGIA: ICD-10-CM

## 2020-09-29 LAB
CCP AB SER IA-ACNC: <0.5 U/ML
RHEUMATOID FACT SERPL-ACNC: <15 IU/ML (ref 0–30)
URATE SERPL-MCNC: 5.4 MG/DL (ref 2–7.5)

## 2020-09-30 LAB — HCV AB SERPL QL IA: NEGATIVE

## 2020-10-01 LAB — ANA SER QL: 0.9 U

## 2020-10-15 ENCOUNTER — OFFICE VISIT - HEALTHEAST (OUTPATIENT)
Dept: RHEUMATOLOGY | Facility: CLINIC | Age: 80
End: 2020-10-15

## 2020-10-15 DIAGNOSIS — M15.0 PRIMARY OSTEOARTHRITIS INVOLVING MULTIPLE JOINTS: ICD-10-CM

## 2020-10-19 ENCOUNTER — HOSPITAL ENCOUNTER (OUTPATIENT)
Dept: MAMMOGRAPHY | Facility: CLINIC | Age: 80
Discharge: HOME | End: 2020-10-19
Attending: FAMILY MEDICINE

## 2020-10-19 DIAGNOSIS — Z12.31 VISIT FOR SCREENING MAMMOGRAM: ICD-10-CM

## 2020-11-30 ENCOUNTER — AMBULATORY - HEALTHEAST (OUTPATIENT)
Dept: CARDIOLOGY | Facility: CLINIC | Age: 80
End: 2020-11-30

## 2020-11-30 DIAGNOSIS — Z95.0 CARDIAC PACEMAKER IN SITU: ICD-10-CM

## 2020-11-30 DIAGNOSIS — I44.2 COMPLETE AV BLOCK (H): ICD-10-CM

## 2021-02-28 ENCOUNTER — IMMUNIZATION (OUTPATIENT)
Dept: NURSING | Facility: CLINIC | Age: 81
End: 2021-02-28
Payer: MEDICARE

## 2021-02-28 PROCEDURE — 0011A PR COVID VAC MODERNA 100 MCG/0.5 ML IM: CPT

## 2021-02-28 PROCEDURE — 91301 PR COVID VAC MODERNA 100 MCG/0.5 ML IM: CPT

## 2021-03-01 ENCOUNTER — AMBULATORY - HEALTHEAST (OUTPATIENT)
Dept: CARDIOLOGY | Facility: CLINIC | Age: 81
End: 2021-03-01

## 2021-03-01 DIAGNOSIS — I44.7 LEFT BUNDLE BRANCH BLOCK (LBBB): ICD-10-CM

## 2021-03-01 DIAGNOSIS — Z95.0 CARDIAC PACEMAKER IN SITU: ICD-10-CM

## 2021-03-01 DIAGNOSIS — I44.7 LBBB (LEFT BUNDLE BRANCH BLOCK): ICD-10-CM

## 2021-03-01 DIAGNOSIS — I45.9 HEART BLOCK: ICD-10-CM

## 2021-03-01 DIAGNOSIS — I44.2 COMPLETE AV BLOCK (H): ICD-10-CM

## 2021-03-28 ENCOUNTER — IMMUNIZATION (OUTPATIENT)
Dept: NURSING | Facility: CLINIC | Age: 81
End: 2021-03-28
Attending: INTERNAL MEDICINE
Payer: MEDICARE

## 2021-03-28 PROCEDURE — 0012A PR COVID VAC MODERNA 100 MCG/0.5 ML IM: CPT

## 2021-03-28 PROCEDURE — 91301 PR COVID VAC MODERNA 100 MCG/0.5 ML IM: CPT

## 2021-04-12 ENCOUNTER — RECORDS - HEALTHEAST (OUTPATIENT)
Dept: LAB | Facility: CLINIC | Age: 81
End: 2021-04-12

## 2021-04-12 LAB
ALBUMIN SERPL-MCNC: 3.7 G/DL (ref 3.5–5)
ANION GAP SERPL CALCULATED.3IONS-SCNC: 9 MMOL/L (ref 5–18)
BUN SERPL-MCNC: 15 MG/DL (ref 8–28)
CALCIUM SERPL-MCNC: 9 MG/DL (ref 8.5–10.5)
CHLORIDE BLD-SCNC: 106 MMOL/L (ref 98–107)
CO2 SERPL-SCNC: 25 MMOL/L (ref 22–31)
CREAT SERPL-MCNC: 0.76 MG/DL (ref 0.6–1.1)
GFR SERPL CREATININE-BSD FRML MDRD: >60 ML/MIN/1.73M2
GLUCOSE BLD-MCNC: 88 MG/DL (ref 70–125)
PHOSPHATE SERPL-MCNC: 3.6 MG/DL (ref 2.5–4.5)
POTASSIUM BLD-SCNC: 4.6 MMOL/L (ref 3.5–5)
SODIUM SERPL-SCNC: 140 MMOL/L (ref 136–145)

## 2021-04-29 ENCOUNTER — RECORDS - HEALTHEAST (OUTPATIENT)
Dept: LAB | Facility: CLINIC | Age: 81
End: 2021-04-29

## 2021-04-29 LAB
SARS-COV-2 PCR COMMENT: NORMAL
SARS-COV-2 RNA SPEC QL NAA+PROBE: NEGATIVE
SARS-COV-2 VIRUS SPECIMEN SOURCE: NORMAL

## 2021-05-07 ENCOUNTER — AMBULATORY - HEALTHEAST (OUTPATIENT)
Dept: CARDIOLOGY | Facility: CLINIC | Age: 81
End: 2021-05-07

## 2021-05-07 ENCOUNTER — COMMUNICATION - HEALTHEAST (OUTPATIENT)
Dept: CARDIOLOGY | Facility: CLINIC | Age: 81
End: 2021-05-07

## 2021-05-07 DIAGNOSIS — I50.21 ACUTE HFREF (HEART FAILURE WITH REDUCED EJECTION FRACTION) (H): ICD-10-CM

## 2021-05-25 ENCOUNTER — RECORDS - HEALTHEAST (OUTPATIENT)
Dept: ADMINISTRATIVE | Facility: CLINIC | Age: 81
End: 2021-05-25

## 2021-05-26 ENCOUNTER — RECORDS - HEALTHEAST (OUTPATIENT)
Dept: ADMINISTRATIVE | Facility: CLINIC | Age: 81
End: 2021-05-26

## 2021-05-27 ENCOUNTER — RECORDS - HEALTHEAST (OUTPATIENT)
Dept: ADMINISTRATIVE | Facility: CLINIC | Age: 81
End: 2021-05-27

## 2021-05-27 ENCOUNTER — AMBULATORY - HEALTHEAST (OUTPATIENT)
Dept: CARDIOLOGY | Facility: CLINIC | Age: 81
End: 2021-05-27

## 2021-05-27 DIAGNOSIS — I50.21 ACUTE HFREF (HEART FAILURE WITH REDUCED EJECTION FRACTION) (H): ICD-10-CM

## 2021-05-27 DIAGNOSIS — E78.5 HYPERLIPIDEMIA LDL GOAL <100: ICD-10-CM

## 2021-05-27 NOTE — PROGRESS NOTES
Plan  Remote from home in 1 month on 5/7/2019  In clinic device check in 3 months on 6/28/2019     Roxana Oden RN BSN PHN  Device Nurse   [facility]   post operative CXR was good  Now with low P waves  Sensitivity adjustments made  Plan  Continue with follow up plans; agree with in clinic check in 3 months

## 2021-05-27 NOTE — TELEPHONE ENCOUNTER
----- Message from Yvette Deleon RN sent at 4/18/2019  2:07 PM CDT -----  Contact: Pt  Please see PC below  Let me know if you need help  Thanks  Kristen  ----- Message -----  From: Darryl Rodriguez  Sent: 4/18/2019   1:49 PM  To: [facility] Ep Rn Support Pool    General phone call:    Caller: Pt    Primary cardiologist: Saw Dr Tom in Consult had device inplant by Dr Babcock    Detailed reason for call: Pt had recent pacemaker placed - she is still having some chest pain - Also feels winded when doing any exercise/stairs.  Is a little concerned and wanted a call back to discuss - thank you     New or active symptoms? New since surgery    Best phone number: home    Best time to contact: any - today please    Ok to leave a detailed message? Yes    Device? YES    Additional Info: thank you

## 2021-05-27 NOTE — PROGRESS NOTES
Remote device check.  Please see link for full device report.  Patient was informed of results and next follow up via phone call.

## 2021-05-27 NOTE — PATIENT INSTRUCTIONS - HE
Pacemaker Post-operative Checklist      The Device Registered Nurse (RN) reviewed the pacemaker function.      The Device RN did a wound assessment and wound care teaching.    Please call the Device Nurses with any signs of infection or questions regarding wound healing. Device Nurse Line: [phone], Option #3      The Device RN demonstrated and displayed the specific remote monitoring system for your pacemaker.      The Device RN reviewed the Partnership Agreement Form.    Patient Instructions    Do not lift your LEFT arm above the shoulder height, perform any vigorous arm movements such as swimming, golfing, washing windows, shoveling show, vacuuming or lifting greater than 10-15lbs with the affected arm for 4 weeks from the date of implant. The last day of restrictions is April 26, 2019.       To reduce the risk of infection, try to avoid any dental procedures for the first 6 weeks after your pacemaker implant. If you have an emergent or urgent dental need during this time, contact the device clinic for a prescription for an antibiotic.      You will receive a device identification (ID) card in the mail from the device  within 6 weeks to replace the temporary ID card you were given in the hospital.      You may travel by any mode of transportation; just show your pacemaker ID card. You may be subject to a hand search or use of a handheld wand, but official should not keep the wand over the implant site for greater than 5-10 seconds.      For any surgery, let your doctor know you have a pacemaker.       Your pacemaker is MRI safe       Most household appliances, including a microwave, will not interfere with your pacemaker function. If you suspect interference, simply move away from the source. Cell phones do not cause a problem. Please refer to the device booklet from the  or their website under the section on electromagnetic interference (DEMI) for further guidelines on things that may  interfere with your pacemaker.       Device Clinic Contact Information  Device Nurses: [phone], Option #3    Device Remote Specialists: [phone], Option #2. For questions about your Remote Transmission or Transmission Schedule  Device Schedulers: [phone], Option #1

## 2021-05-27 NOTE — PROGRESS NOTES
DEVICE CLINIC RN POST-OP NOTE    Reason for visit: 1 week post op pacemaker and wound check     Device: iList L331 ACCOLADE MRI EL  Procedure date: 3/29/2019  Implant Diagnosis: AVB 3rd degree, Roland, weakness  Implanting Physician: Dr. Aaron Babcock      Assessment  Subjective: Pt denies pain.   Vitals:   Vitals:    04/05/19 1246   BP: 110/60   Pulse: 76   Resp: 16   Temp: 99.3  F (37.4  C)     Heart Sounds: normal  Lung Sounds: clear to auscultation bilaterally  Incision/device pocket: The steri-strips were removed from the incision and it was cleansed with adhesive remover and alcohol wipes. The incision is clean, dry and intact. There are no signs of infection present. The incision is well healed.        Device Findings  Interrogation of device reveals normal capture thresholds  See the Paceart Report for a full summary of the device information.    Other: Reduced P wave sensing since implant was 2.4mv, 0.4-0.8mV today, adjust RA sensitivity. Threshold went from 0.5V@0.4ms to 1V@0.4ms. Impedance stable.      Device Report:  Type: In clinic 1 week post op pacemaker and wound check.   Presenting Rhythm: ASVS, PVC, ASVP, undersensed P waves noted  Lead/Battery status: RA sensing is down from implant (2.4mV to 0.4-0.8mV biopolar and 0.5-1mV unipolar). Threshold is up slightly from 0.5V@0.4ms to 1V@0.4ms, impedance is stable. RV lead is stable.   Arrhythmias:  None detected.   Comments: Reprogrammed RA sensitivity from AGC 0.5mV to AGC 0.15mV, RA and RV output from trend 3.5V to trend 2.5V, ATR fallback mode from VDIR to DDIR and fallback rate from 70 to 60bpm. See Epic for post op note. BK      Patient Education    [facility]'s Partnership Agreement for Device Patients and Post-operative Checklist were presented and reviewed with patient at today's appointment.    Signs and symptoms of infection, poor wound healing, and device function were reviewed. Range of motion and weight restrictions  for the LEFT side are for four weeks. She was issued a Latitude NXT remote monitor and instructed on its set-up and use for remote monitoring by the [employer] representative prior to hospital discharge. These instructions were reviewed with the patient at today s visit.       Plan  Remote from home in 1 month on 5/7/2019  In clinic device check in 3 months on 6/28/2019    Roxana Oden RN BSN PHN  Device Nurse   [facility]

## 2021-05-28 ENCOUNTER — RECORDS - HEALTHEAST (OUTPATIENT)
Dept: ADMINISTRATIVE | Facility: CLINIC | Age: 81
End: 2021-05-28

## 2021-05-28 NOTE — TELEPHONE ENCOUNTER
"Called and spoke with the patient. Patient was actually calling in for discussion of her chest pain yesterday. She had none in the morning. She and her  went an ran errands; doctor appointment, [employer] shopping, etc. and she was \"totally exhausted\". Her chest pain was described as \"the same as before it was was with every breath, unable to take a deep breath, but could breath shallow\".  The pain was with every breath. She got home rested, and eventually after 1-2 hours it went away, and hasn't returned since. Writer questions if pain sounds like atypical chest pain, possible pleuritic pain??    Pt had no other symptoms at the time. Pt states that she saw her PCP this morning whom told her to stay active and rest when needed. States that Dr. Ham will be reaching out to find out OV 5/7/19 with MAT what was discussed. Discussion with patient over the past week, she has attended the [employer] and able to do the stationary bike for 2.5-3.0 miles without chest pain, and then walk at least 1 lap at the gym, sometimes 2. Denies that chest pain happens during exercising.    Pt has her CTA scheduled for 5/21morning. Also has OV with MAT end of June. Dr. Tom recommendations. JESSICA,Rn  "

## 2021-05-28 NOTE — TELEPHONE ENCOUNTER
----- Message -----  From: Laureen Killian MD  Sent: 4/29/2019   1:24 PM  To: Heron Ravi RN    Thanks, at this point I think her pain was related to her pulmonary emboli and not pericarditis. No need for colchicine now.     Thanks, EG

## 2021-05-28 NOTE — PROGRESS NOTES
Return call from patient.  She states understanding.  Answered all questions and reviewed follow up.  Gave patient the phone number for central scheduling to make apt for CT.  Orders placed and patient will be contacted to schedule.

## 2021-05-28 NOTE — TELEPHONE ENCOUNTER
----- Message from Laureen Killian MD sent at 4/18/2019  5:14 PM CDT -----  Helena was added to my RAC for next Tuesday. She needs a limited echo to rule out effusion. Can you get this set up?    EG

## 2021-05-28 NOTE — PATIENT INSTRUCTIONS - HE
- Will check blood today and EKG    - Talk to your GI doctor about colchicine for possible pericarditis    - I will talk with Dr. Babcock about your drop in your pump function    - Start low dose furosemide 20mg every other day to see if that helps with your shortness of breath    - See Dr. Tom in 2 weeks

## 2021-05-28 NOTE — TELEPHONE ENCOUNTER
==View-only below this line===    ----- Message -----  From: Reese Tom DO  Sent: 5/17/2019   3:19 PM  To: Heron Ravi RN    WAITING ON [facility] cta

## 2021-05-28 NOTE — TELEPHONE ENCOUNTER
Return call made to Dr. Welsh, patient's PCP. Dr. Welsh questions whether Dr. Tom would be willing to go ahead and start Metoprolol and Spironolactone as mentioned in 5/7/19 Office Note. PCP realizes while all medications were not started at once. He feels that the patient would benefit from the addition of these medications but wanted to defer to Dr. Tapia's expertise in the matter.     Patient and PCP aware Dr. Tom is out of the office and will return next week. Patient will being seeing Dr. Welsh in the near future for INR recheck. Patient will receive return call when MAT returns and provides recommendations.

## 2021-05-28 NOTE — TELEPHONE ENCOUNTER
Called and spoke to patient regarding results and recommendations after X-ray reviewed by Dr. Silver, pt agreed to plan.  She states she cannot take IBU but has been taking tylenol and it has been working to help with the pain.    Encouraged pt that if her chest pain worsens or her shortness of breathe becomes worse to go to the ER.  Pt verbalized understanding.    Kyra

## 2021-05-28 NOTE — TELEPHONE ENCOUNTER
----- Message from Isa Rouse sent at 4/29/2019  8:26 AM CDT -----  Contact: Patient  General phone call:    Caller: Patient   Primary cardiologist: Vaishali  Detailed reason for call: Patient was recently discharged from hospital and would like to know if she should keep her Chest CT appt that is currently scheduled on 5/2/19. Patient would also like to know if she should see Dr. Tom sooner than her scheduled 5/15/19 appointment.   New or active symptoms? No  Best phone number:   Best time to contact: anytime before 10:45am, patient will be away at 10:45am  Ok to leave a detailed message? Yes  Device? Yes    Additional Info:

## 2021-05-28 NOTE — PROGRESS NOTES
Thank you for asking the [facility] team to see Helena Brown in consultation  to evaluate chest pain and dyspnea since her pacemaker.      Assessment/Plan:   Exertional dyspnea that has persisted despite pacemaker placement and chest pain at night in bed. Echo with new decline in EF and trace pericardial effusion. She may have pericarditis, will check ESR and CRP and consider colchicine.  Regarding her decline in EF will recheck basic labs, as well as a BNP and EKG today. EKG with sequential AV pacing today.    I will reach out to Dr. Babcock about her decline in EF. This seems too soon after a pacemaker to have dyssynchrony induced cardiomyopathy. Consider coronary CT angiogram to rule out significant obstructive coronary disease.    Will trial low dose furosemide every other day to see if getting a little fluid off helps. She is not grossly fluid overloaded on exam, however.     F/U Dr. Tom in 2 weeks and I will reach out to Dr. Babcock       Current History:   Helena Brown is a 78 y.o. with recently diagnoses of complete heart block with placement of a dual chamber pacemaker at the end of March. Her symptoms prior to that were extreme fatigue and dyspnea on her typical 2 mile walks. Helena notes that since her pacemaker she has had continued dyspnea when trying to do her walks or with stairs. She also notes discomfort in the middle of her chest when she lays down at night. Recent chest x-ray showed stable lead placement. Echocardiogram showed a decline in EF to 30-35% since her pacemaker was placed and a trace pericardial effusion. Dr. Silver had reviewed her CXR last week and recommended ibuprophen but due to cholangenous cystitis that she reports that she cannot take NSAIDS. Helena has taken two times a day tylenol for years. She reports that the chest discomfort when laying down is slowly improving but that the dyspnea on exertion is persisting. She denies palpitations, syncope, early  satiety. She has chronic lower leg edema related to venous insufficiency.     Past Medical History:     Past Medical History:   Diagnosis Date     Anxiety      Arthritis      Asthma      Colitis     Collagenous     Complete heart block (H)      Hypertriglyceridemia      Macular degeneration      Osteopenia      Osteoporosis      Pacemaker      Restless legs        Past Surgical History:     Past Surgical History:   Procedure Laterality Date     APPENDECTOMY       BREAST EXCISIONAL BIOPSY Right [id number]     BREAST SURGERY      biopsy     BUNIONECTOMY       EP PACEMAKER INSERT N/A 3/29/2019    Procedure: EP Pacemaker Insertion;  Surgeon: Aaron Babcock MD;  Location: [facility];  Service: Cardiology     INSERT / REPLACE / REMOVE PACEMAKER       JOINT REPLACEMENT       [place] TOTAL KNEE ARTHROPLASTY Left 8/30/2018    Procedure:  LEFT MINIMALLY INVASIVE TOTAL KNEE ARTHROPLASTY;  Surgeon: Segun Banks MD;  Location: [facility];  Service: Orthopedics     [place] TOTAL KNEE ARTHROPLASTY Right 11/8/2018    Procedure: RIGHT MINIMALLY INVASIVE TOTAL KNEE ARTHROPLASTY;  Surgeon: Segun Banks MD;  Location: [facility];  Service: Orthopedics     ROTATOR CUFF REPAIR       TONSILLECTOMY         Family History:     Family History   Problem Relation Age of Onset     Breast cancer Cousin      Heart disease Mother      Alzheimer's disease Father      Clotting disorder Neg Hx        Social History:    reports that she quit smoking about 58 years ago. She has never used smokeless tobacco. She reports that she drinks about 4.2 oz of alcohol per week. She reports that she does not use drugs.    Meds:     Current Outpatient Medications   Medication Sig Note     acetaminophen (TYLENOL) 500 MG tablet Take 1,000 mg by mouth 2 (two) times a day.       aspirin 81 MG EC tablet Take 81 mg by mouth every other day.      bismuth subsalicylate (BISMUTH SUBSALICYLATE) 262 mg Chew chew tab Chew 3 tablets 3 (three) times a  "day.  11/8/2018: Patient indicates that this medication helps stomach when taking aspirin     CALCIUM CITRATE ORAL Take 600 mg by mouth 2 (two) times a day.       cholecalciferol, vitamin D3, (VITAMIN D3) 1,000 unit capsule Take 1,000 Units by mouth daily.      gabapentin (NEURONTIN) 300 MG capsule Take 300-600 mg by mouth at bedtime.             KRILL OIL ORAL Take 1 capsule by mouth daily.       magnesium oxide 250 mg Tab Take 250 mg by mouth at bedtime.         Allergies:   Nsaids (non-steroidal anti-inflammatory drug) and Sulfa (sulfonamide antibiotics)    Review of Systems:   Review of Systems:   General: WNL  Eyes: WNL  Ears/Nose/Throat: WNL  Lungs: WNL  Heart: WNL  Stomach: WNL  Bladder: WNL  Muscle/Joints: WNL  Skin: WNL  Nervous System: WNL  Mental Health: WNL     Blood: WNL       Objective:      Physical Exam  @LASTENCWT:3@  5' 2\" (1.575 m)  @BMI:3@  /68 (Patient Site: Left Arm, Patient Position: Sitting, Cuff Size: Adult Regular)   Pulse (!) 58   Resp 16   Ht 5' 2\" (1.575 m)   Wt 112 lb (50.8 kg)   BMI 20.49 kg/m      General Appearance:   Alert, cooperative and in no acute distress.   HEENT:  No scleral icterus; the mucous membranes were pink and moist.   Neck: JVP flat. No thyromegaly. No HJR   Chest: The spine was straight. The chest was symmetric.   Lungs:   Respirations unlabored; the lungs are clear to auscultation.   Cardiovascular:   S1 and S2 normal and without murmur. No clicks or rubs. No carotid bruits noted. Right DP, PT, and radial pulses 2+. Left DP, PT, and radial pulses 2+.   Abdomen:  No organomegaly, masses, bruits, or tenderness. Bowels sounds are present   Extremities: No cyanosis, clubbing, or edema.   Skin: No xanthelasma.   Neurologic: Mood and affect are appropriate.         Lab Review   Lab Results   Component Value Date     03/28/2019     03/28/2019     08/20/2018    K 4.4 03/28/2019    K 4.6 03/28/2019    K 4.3 11/09/2018     03/28/2019    CL " 107 03/28/2019     08/20/2018    CO2 25 03/28/2019    CO2 27 03/28/2019    CO2 24 08/20/2018    BUN 26 03/28/2019    BUN 25 03/28/2019    BUN 13 08/20/2018    CREATININE 0.85 03/28/2019    CREATININE 0.80 03/28/2019    CREATININE 0.69 11/08/2018    CALCIUM 9.9 03/28/2019    CALCIUM 9.8 03/28/2019    CALCIUM 10.0 08/20/2018     Lab Results   Component Value Date    WBC 7.7 03/28/2019    WBC 5.3 11/08/2018    HGB 11.7 (L) 03/28/2019    HGB 10.9 (L) 11/10/2018    HGB 9.7 (L) 11/09/2018    HCT 36.2 03/28/2019    HCT 35.2 11/08/2018    MCV 90 03/28/2019    MCV 94 11/08/2018     03/28/2019     11/08/2018     08/30/2018     Lab Results   Component Value Date    CHOL 179 09/12/2018    CHOL 226 (H) 12/16/2015    TRIG 67 09/12/2018    TRIG 180 (H) 12/16/2015    HDL 61 09/12/2018    HDL 84 12/16/2015     Lab Results   Component Value Date     (H) 03/28/2019         Laureen Killian M.D.

## 2021-05-28 NOTE — TELEPHONE ENCOUNTER
Spoke with the patient. Verified to cancel CT test on 5/2, as already completed. Pt will do so. Next available with MAT is on 5/15/19. Will ask schedulers to add her to the cancellation list. ARPIT LOPEZ

## 2021-05-28 NOTE — TELEPHONE ENCOUNTER
During discussion patient informs writer that EMG requested she speak to her GI doctor to see if she could take colchicine. Pt has been instructed and given the okay to take colchicine. Informed patient will forward onto EMG for recommendations in light of new test results on 4/26, and new medications Coumadin. Pt verbalized understanding will call back with recommendations. Dr. Killian, recommendations? CMM,RN

## 2021-05-28 NOTE — TELEPHONE ENCOUNTER
----- Message from Darryl Rodriguez sent at 5/20/2019  1:59 PM CDT -----  Contact: PCP  General phone call:    Caller: Helena    Primary cardiologist: Dr Tom    Detailed reason for call: PCP clinic calling - (Rajan Welsh)   Pt was to start METOPROLOL // but also start LISINOPRIL so they want to clarify medications    Please call back today if possible -> [phone] - ask for Dr Welsh -

## 2021-05-28 NOTE — TELEPHONE ENCOUNTER
----- Message from Julio Carrillo sent at 5/16/2019  2:34 PM CDT -----  Contact: Helena  General phone call:    Caller: Helena  Primary cardiologist: Dr. Tom  Detailed reason for call: Patient has questions about her upcoming appointment   New or active symptoms? Active  Best phone number: [phone]  Best time to contact: anytime  Ok to leave a detailed message? yes  Device? no    Additional Info:

## 2021-05-28 NOTE — TELEPHONE ENCOUNTER
I spoke with Helena on the phone. She notes worsening chest pain, when laying down, with exertion, and when taking a deep breath. She also noted feeling very short of breath walking to/from the [place] today. I recommended that she come in thru the ER for evaluation and that they could obtain the CT scan requested by Dr. Babcock to rule out pulmonary embolism. Would also consider a repeat limited echocardiogram to see if the small pericardial effusion has worsened. She understood and will proceed to the ER.

## 2021-05-28 NOTE — PROGRESS NOTES
Attempted to contact pt, voicemail message was left with contact information and instructing pt to call back.  4/24/2019 9:31 AM  Yvette Deleon RN

## 2021-05-28 NOTE — TELEPHONE ENCOUNTER
----- Message from Caitie German sent at 4/26/2019  2:22 PM CDT -----  Contact: Patient  Caller: Helena    Primary cardiologist: Dr. Killian    Detailed reason for call: Helena states she has chest pain and is short of breath. She has a follow up appt scheduled with Dr. Tom as recommended by Dr. Killian, but the first available was 5/15/19. Also, Helena has a CTA Chest test on 5/2/19. Please call patient.     New or active symptoms? See above    Best phone number: [phone]    Best time to contact: Today    Ok to leave a detailed message? No    Device? Yes

## 2021-05-28 NOTE — TELEPHONE ENCOUNTER
Called and spoke with the patient and reviewed communication from EMG. Discussion and education of Warfarin, body process with blood clots. May take some time for improvement in symptoms. Encouraged to keep an open dialogue with writer. If pain increase to call back sooner, but if no improvement/slight improvement in chest discomfort after a week to call back writer. Pt verbalized  Understanding. JESSICA,RN

## 2021-05-28 NOTE — TELEPHONE ENCOUNTER
"Return call made to patient to discuss her concerns. Patient reports that on 4/23 she was instructed to f/u with Dr. Tom in 2 weeks and wasn't able to schedule until 5/13. She notes upcoming CTA on 5/2 to evaluate her symptoms of CP and shortness of breath.     Today, the patient reports her chest pain and shortness of breath \"seem to have gotten worse\". She notes 3/10 chest pain, worse when laying down and continued dyspnea with exertion. She notes that symptoms \"weren't as bad\" when she saw EMG on 4/23. She does not feel like the symptoms have acutely worsened, but worsened enough to warrant a phone call for advice.     Reasons to seek more urgent evaluation with patient were reviewed with patient. She verbalizes understanding and agreement with plan.     Patient assured that her concerns would be forwarded EMG for her review and a return call would be made with any new recommendations.     Dr. Killian, please review and advise. -ejb  "

## 2021-05-29 ENCOUNTER — RECORDS - HEALTHEAST (OUTPATIENT)
Dept: ADMINISTRATIVE | Facility: CLINIC | Age: 81
End: 2021-05-29

## 2021-05-29 NOTE — TELEPHONE ENCOUNTER
==View-only below this line===    ----- Message -----  From: Reese Tom DO  Sent: 5/28/2019   3:12 PM  To: Heron Ravi RN    Yes.  Fasting lipid panel.

## 2021-05-29 NOTE — TELEPHONE ENCOUNTER
----- Message -----   From: Reese Tom DO   Sent: 5/21/2019   8:07 AM   To: Laina Alejandro RN     Start metoprolol XL 12.5 mg daily.  Check BMP now, prior to starting spironolactone.

## 2021-05-29 NOTE — TELEPHONE ENCOUNTER
Spoke with patient. Discussion of Toprol medication. Agreeable to start. Rx sent to pharmacy. Discussion of BMP lab and new order for urgent ECHO to assess pericardial effusion. Pt agreeable to get ECHO completed. LM for Cardiology non-invasive to call tomorrow after 0930 (when pt will be home) to arrange as soon as possible. Pt verbalized understanding. Will have blood drawn at whichever location having ECHO completed.

## 2021-05-29 NOTE — TELEPHONE ENCOUNTER
Called patient and discussion of small effusion, non-urgent and stable. Reassurance. Informed patient that Dr. Tom made aware and will notify [facility] of effusion. Return call will be made with any further recommendations. Pt appreciative of the phone call. JESSICA,RN

## 2021-05-29 NOTE — TELEPHONE ENCOUNTER
"Review of recent events/pt care. Pt had PPM placed on 3/29/19. CT Pe/Run for shortness of breath on 4/26 with PE and initiation of blood thinners. Continued TSANG/SOB and 5/21 CTA with CT over read suggesting \"new small to moderate pleural effusion.\" Urgent complete ECHO ordered and completed today. Incoming phone call from [facility] to report findings.     Today, incoming call from Dr. Ochoa. Per Dr. Ochoa, \"small effusion, no tamponade.\" Reports EF is mildly reduced. Informed writer that patient is post-pacemaker and should make Dr. Babcock aware. ECHO report will become available shortly. CMM,RN  "

## 2021-05-29 NOTE — PROGRESS NOTES
Called and spoke with patient to review upcoming OV with [facility] to discuss device appt for possible upgrade. Also follow-up of pericardial effusion with a limited ECHO. Pt verbalized understanding. JESSICA,RN

## 2021-05-29 NOTE — PROGRESS NOTES
Quite short of breath  But she was also very short of breath before I put the pacemaker in when she presented with heart block  Echocardiogram shows a tiny/trace pericardial effusion that has lessened  CBC showed hemoglobin 11.3 which is stable    The comprehensive  metabolic profile normal  Did have recent pulmonary embolism and is now on warfarin  Made some medicine adjustments stop the lisinopril and aspirin  She should do well, encourage her to be more active  Follow-up with me in 1 month and we will further discuss upgrading her to a CRT-D device; unfortunately, she does have considerable collaterals from the left subclavian vein so 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

## 2021-05-29 NOTE — TELEPHONE ENCOUNTER
Pt scheduled for ECHO today at 1245. Will call patient have come to outpt lab for blood draw. JESSICA,RN

## 2021-05-29 NOTE — PROGRESS NOTES
Spoke to Device ARPIT Patterson, able to squeeze patient in with [facility] on 6/4 with device rep..  Forwarded to Roseanna-  and pt agreed to appointment.  Kyra

## 2021-05-29 NOTE — PROGRESS NOTES
Spoke to patient regarding Dr. Babcock's recommendations.  Pt agrees, she also stated that she has talked with her family and they are all in agreement that she should move forward with upgrading her device to help with her symptoms as EF has dropped since dual PPM was placed.  She would like to discuss this further with GAG at the clinic visit.  Message sent to device schedulers to try and get pt in to see GAG ASAP to discuss PPM upgrade.      No further questions or concerns at this time.    Kyra

## 2021-05-29 NOTE — TELEPHONE ENCOUNTER
Pt would prefer to have her blood drawn at her [facility] closer to home. Pt is requesting a lipid panel be drawn also at that time as it has been more than 6 months. Dr. Tom, may I place order for lipid panel? JESSICA,RN

## 2021-05-29 NOTE — PROGRESS NOTES
Patient presented with complete heart block and profound bradycardia  He had a dual-chamber [employer] pacemaker implant March 29, 2019  Device function has been satisfactory  Patient developed chest pain in had a chest CT angiogram that showed small acute pulmonary embolism; 4/26/2019.  Pulmonary embolism noted to the right lower lobe and left upper lobe  Started on warfarin because of the pulmonary embolism  Leg ultrasound was normal, no deep vein thrombosis  Echocardiograms have shown initially a trace pericardial effusion but echocardiogram 8/22/2090 18 shows mild to moderate pericardial effusion estimated 300 to 400 cc of fluid without tamponade; ejection fraction 40%  Coronary CT angiogram May 7, 2019 showed no significant, high-grade coronary artery disease there was a small moderate pericardial effusion  Chest x-ray shows dual-chamber pacemaker with satisfactory lead position  Pericardial effusion has developed since pacemaker implant; this raise possibility of perforation  No evidence for tamponade       Plan  Follow-up pericardial effusion and determine if pericardiocentesis is necessary  I am not inclined to reposition  Pacemaker leads at this time  Electronically, that the lead parameters are excellent, would suggest the leads are not perforated  We will set up follow-up in the device clinic with me less than 1 month

## 2021-05-29 NOTE — PROGRESS NOTES
Pt was called, corresponding information/recommendations reviewed, verbalized understanding, has no further questions at this time, contact information was given for further concerns/questions and scheduling notified to contact pt   Spent time answering pts questions and concerns  Reassurance was given to both the pt and her    6/5/2019 10:20 AM  Yvette Deleon RN

## 2021-05-29 NOTE — TELEPHONE ENCOUNTER
Spoke with Dr. Tom and reviewed ECHO results. Non-urgent and stable effusion. Inform Dr. Babcock. JESSICA,RN

## 2021-05-29 NOTE — PATIENT INSTRUCTIONS - HE
Helena Brown    Thank you for coming to the [facility] today for a cardiac evaluation  It was my pleasure to see you today  A good contact for any questions would be Yvette Deleon  RN @ [phone]    I am concerned about the pericardial fluid that seems to have occurred since the pacemaker was implanted which may represent a perforation of 1 of the leads  Because of relatively low blood pressure would recommend stopping lisinopril  Because you are on warfarin, stop aspirin  You are scheduled for a limited echocardiogram today this will assess the size of the pericardial effusion  Because of the reduced ejection fraction, you may be candidate for upgrade of your pacemaker to a biventricular resynchronization device  Blood work today will include a CBC and a BMP and a BNP

## 2021-05-30 ENCOUNTER — RECORDS - HEALTHEAST (OUTPATIENT)
Dept: ADMINISTRATIVE | Facility: CLINIC | Age: 81
End: 2021-05-30

## 2021-05-30 NOTE — PROGRESS NOTES
I had a telephone conversation with patient  She continues to have shortness of breath on exertion  Echocardiogram showed no pericardial effusion  There was some left ventricular hypertrophy in some views there was global hypokinesia ejection fraction was about 40%  Prior pulmonary function studies were normal  Lung scan showed no definite evidence for pulmonary embolism  Previous venogram showed left subclavian vein was patent  Impression  Shortness of breath may be from dyssynchrony  She has a intrinsic left bundle branch block and does show a fair amount of Ventricular pacing  We have been talking about upgrading her device to a resynchronization device  Plan  Schedule patient for a pacemaker upgrade to a CRT-P  [employer] system  Possible same-day surgery  Should check INR 2 days prior to procedure would want INR 2-2.5; would like not to interrupt warfarin entirely

## 2021-05-30 NOTE — TELEPHONE ENCOUNTER
Helena is calling in today to let Dr. Tom know that she has completed her Pulmonary Functions Test and is planning to see Dr. Babcock next week. She is wondering if she needs any further testing prior to this appt.    Dr. Tom; Per your 6/29 visit notes:  Plan:  1.  PFTs and 6 minute walk testing.     2. Hold metoprolol given hypotension.    3. Off lisinopril per Dr. Babcock.   4. PRN Lasix 20 mg as needed for dyspnea  5. Warfarin anticoagulation for pulmonary embolism  6. May need to consider V/Q scan to determine severity of mismatch and repeat echo (complete) to assess for TR and pulmonary hypertension    Please advise if you would like me to order V/Q scan or Complete Echo at this time.    Thank you,   Francisca/ARPIT

## 2021-05-30 NOTE — PROGRESS NOTES
Pt did a 6 min walk test on RA per MD order. Pt did not take any rests or require oxygen for test. Pt stated her legs did not hurt but she felt like her breathing takes awhile to recover to baseline. Pt was in no distress during or after test.  RESPIRATORY CARE NOTE     Patient Name: Helena Brown  Today's Date: 7/9/2019     Problem List  Patient Active Problem List   Diagnosis     Osteoporosis     Degenerative arthritis of left knee     Restless leg syndrome     Elevated blood pressure reading without diagnosis of hypertension     Hyperkalemia     Degenerative arthritis of right knee     Heart block     Complete AV block (H)     Exertional dyspnea     Cardiac pacemaker in situ     Acute HFrEF (heart failure with reduced ejection fraction) (H)     Other pulmonary embolism without acute cor pulmonale (H)                           Mary ONEILT

## 2021-05-30 NOTE — PATIENT INSTRUCTIONS - HE
Helena Brown    Thank you for coming to the [facility] today for a cardiac evaluation  It was my pleasure to see you today  A good contact for any questions would be Yvette Deleon  RN @ [phone]    Pacemaker evaluation today is excellent  You do mostly have intrinsic conduction and pacing occurs 15% of the time  Your EKG shows a left bundle branch block and quite wide QRS  Recent pulmonary function studies were normal I do not believe you have any significant lung disease  Obtain a lung scan to see if there has been substantial pulmonary emboli  Repeat echocardiogram to assess ejection fraction and assess pulmonary artery pressure and look for pericardial effusion  Obtain a left arm venogram to see if left subclavian vein is occluded with clot   It may be that dyssynchrony is causing your symptoms.  We see LV dyssynchrony from a high amount of ventricular pacing or from a left bundle branch block  We need to consider upgrading her device to a biventricular system, which  would be placement of additional lead

## 2021-05-30 NOTE — PROGRESS NOTES
RESPIRATORY CARE NOTE     Patient Name: Helena Brown  Today's Date: 7/9/2019     Complete PFT done. Pt performed tests with good effort. Test results meet ATS criteria. Albuterol neb given. Results scanned into epic. Pt left in no distress.       Mary Ernst, LRT    RESPIRATORY CARE NOTE     Patient Name: Helena Brown  Today's Date: 7/9/2019     Problem List  Patient Active Problem List   Diagnosis     Osteoporosis     Degenerative arthritis of left knee     Restless leg syndrome     Elevated blood pressure reading without diagnosis of hypertension     Hyperkalemia     Degenerative arthritis of right knee     Heart block     Complete AV block (H)     Exertional dyspnea     Cardiac pacemaker in situ     Acute HFrEF (heart failure with reduced ejection fraction) (H)     Other pulmonary embolism without acute cor pulmonale (H)                           Mary Ernst LRT

## 2021-05-30 NOTE — PROGRESS NOTES
Noted.  Chart prepped, orders placed and patient will be contacted to schedule procedure.    INR's obtained from [facility], will ask patient to have INR obtained 2 days prior to her implant per GAG.

## 2021-05-30 NOTE — TELEPHONE ENCOUNTER
Patient contacted with response/recommendations per Dr. Tom that no further testing is needed at this time. sk/RN

## 2021-05-30 NOTE — PROGRESS NOTES
1940  Home:[phone] (home) Cell:There is no such number on file (mobile).  Emergency Contact: Jamarcus Brown [phone]    +++Important patient information for CSC/Cath Lab staff : None+++    [facility] EP Cath Lab Procedure Order     Device Implant/Revision:  Procedure: Upgrade to CRT-P  Device Type: Dual Pacemaker  Device Company/Device Rep Needed for Procedure: [employer]    Diagnosis:  Cardiomyopathy  Anticipated Case Duration:  Standard  Scheduling Needs/Timeframe:  Next Available  Anesthesia:  None  Research Protocol:  No    [facility] EP Cath Lab Prep   Ordering Provider: Dr Babcock  Ordering Date: 7/29/2019  Orders Status: Intial order placed and Order set placed    H&P:  Pt to schedule with [facility]D to complete  PCP: Qasim Welsh MD, [phone]    Pre-op Labs: Done within 7 days    Medical Records Pertinent for Procedure:  VQ scan 7-26-19, CT/MRI CTA 5-21-19, Echo 7-26-19 EF 44%, EKG 7-16-19 SB @49 LBBB and Device Implant Record dual pacemaker implant 3-29-19    Patient Education:    Teach with Patient: Will be completed via phone prior to procedure, and letter was also sent to pt via mail/JustGo with written pre-procedural instructions.    Risks Reviewed:     Pacemaker Insertion    <1% for each of the following:  infection, bleeding, hematoma, pneumothorax, subclavian vein thrombosis, cardiac perforation, cardiac tamponade, arrhythmias, pectoral or diaphragmatic stimulation, air embolus, pocket erosion, device interactions.    <4% lead dislodgment, <1% lead fracture or generator  malfunction.    <0.5% CVA or MI.    <0.1% death.    If external defibrillation or CV is needed, 25% risk for superficial burn.    For patients on anticoagulation, the risk of bleeding, hematoma and tamponade are increased.      Biventricular Implants  In addition to standard risks for ICD or pacemaker implant, there is:    90% success of LV lead placement.    10%  dislodgment (LV lead)    <3% risk venous rupture,  cardiac tamponade    Patient counseled re: options if LV lead placement is not feasible transvenously.      Consent: Will be obtained in [facility] [place]    Pre-Procedure Instructions that were Reviewed with Patient:  NPO after midnight, Remove all jewelry prior to coming in for procedure, Shower prior to arrival, Notified patient of time and date of procedure by CV , Transportation arrangements needed s/p procedure, Post-procedure follow up process, Sedation plan/orders and Pre-procedure letter was sent to pt by CV     Pre-Procedure Medication Instructions:  Instructions given to pt regarding anticoagulants: Coumadin- Continue Warfarin with goal INR 2-2.5 for the procedure  Instructions given to pt regarding antiarrhythmic medication: N/A for this type of procedure; N/A  Instructions for medication, other than anticoagulants/antiarrhythmics listed above, given to pt: to take all morning medications with small sips of water, with the exception of OTC supplements and MVI      Allergies   Allergen Reactions     Nsaids (Non-Steroidal Anti-Inflammatory Drug) Other (See Comments)     Patient has history of microcolitis.  GI aggravation     Sulfa (Sulfonamide Antibiotics)      Eye drops caused burning       Current Outpatient Medications:      acetaminophen (TYLENOL) 500 MG tablet, Take 1,000 mg by mouth 2 (two) times a day. , Disp: , Rfl:      bismuth subsalicylate (BISMUTH SUBSALICYLATE) 262 mg Chew chew tab, Chew 3 tablets 3 (three) times a day. , Disp: , Rfl:      CALCIUM CITRATE ORAL, Take 600 mg by mouth 2 (two) times a day. , Disp: , Rfl:      cholecalciferol, vitamin D3, (VITAMIN D3) 1,000 unit capsule, Take 1,000 Units by mouth daily., Disp: , Rfl:      furosemide (LASIX) 20 MG tablet, Take 1 tablet (20 mg total) by mouth daily as needed (For WT gain of > 3 lb)., Disp: 30 tablet, Rfl: 11     gabapentin (NEURONTIN) 300 MG capsule, Take 300-600 mg by mouth at bedtime.    , Disp: , Rfl:       KRILL OIL ORAL, Take 1 capsule by mouth daily. , Disp: , Rfl:      magnesium oxide 250 mg Tab, Take 250 mg by mouth at bedtime. , Disp: , Rfl:      metoprolol succinate (TOPROL XL) 25 MG, Take 0.5 tablets (12.5 mg total) by mouth daily., Disp: 30 tablet, Rfl: 6     warfarin (COUMADIN/JANTOVEN) 3 MG tablet, Take 1 tablet (3 mg total) by mouth daily., Disp: 30 tablet, Rfl:     Documentation Date:7/29/2019 1:56 PM  Dixie Arias RN

## 2021-05-30 NOTE — PROGRESS NOTES
In clinic device check with Device RN and follow-up with Dr. Babcock.  Please see link for full device report.  Patient was informed of results and next follow up during today's visit.

## 2021-05-31 NOTE — TELEPHONE ENCOUNTER
"----- Message from Megan Leigh sent at 8/28/2019  3:12 PM CDT -----  Contact: Pt  General phone call:    Caller: Pt  Primary cardiologist: Priscilla  Detailed reason for call: Pt calling because she is having some symptoms she'd like to discuss. Also had a third lead added to her pacemaker. She stated it felt like it had moved. Please call back  Best phone number: [phone]  Best time to contact: Any  Ok to leave a detailed message? Yes  Device? Yes    Additional Info:           Called patient to address her concerns. Pt reports that she had been feeling pretty good since her recent device upgrade to a CRT-P on 8/12/19. She had been increasing her activity to walking about a mile and half a day. She noticed that yesterday and today right away in the morning, she woke up feeling weak and unsteady. She states that she just felt out of it. She also stated that she felt some vague discomfort to her left side of her chest below the device. She it was not pain just \"different\". She denies weight gain- states she has actually lost weight. She has no swelling and no shortness of breath. She has felt fine in the afternoons. She has not sent in any remote device checks. She states that she was in for one of her post-op device checks on 8/19/19 and was told that the \"third lead moved\" and she had not heard back from device team with what Dr. Babcock thought of this new find. She wants follow up on that. Will pass along to Megan as an update- this does not sound like HF symptoms and she has been titrated off all HF medications. Will pass along to device for follow up.      NAREN Guzman for you as patient just saw you and if you have any recommendations based on above symptoms.    Device/Swathi,  Please see above. Pt has not sent in a remote device and thought \"this was happening all the time as the light is always blinking\". Vague symptoms of severe weakness and unsteadiness in the AM along with x1 episode of left " sided chest discomfort. Pt also wants to know if Dr. Babcock addressed the lead issue with her last in-office check with Yesenia. Can someone look into and call the patient? Let me know what I can do to help!  Thanks,  Yue

## 2021-05-31 NOTE — PATIENT INSTRUCTIONS - HE
Helena Brown,    It was a pleasure to see you today at the [facility].     My recommendations after this visit include:  - Please follow up with Dr Tom in 6-8 weeks   - Please follow up with Megan Wells as needed  - Please follow up with Dr Babcock in November  - No changes to your medications    Megan Wells CNP    What is the [facility]?     The [facility] is a heart failure specialty clinic within [facility].  You will work with your cardiologist, nurse practitioner, and nurses to carefully adjust medications and learn how to live with heart failure.  The Heart Failure Program will help you:      Better understand your chronic heart condition    Feel better and avoid hospital stays    Monitoring for Symptoms      Call the Heart Failure Phone Line ([phone]) if you have any of these symptoms:     Increased shortness of breath/shortness of breath at rest    Waking up at night with difficulty breathing    Unable to lie down for sleep due to symptoms or needing to sit upright for sleep    Weight gain of 2 pounds a day for 2 days in a row OR 5 pounds in 1 week    Increased swelling in your ankles or legs    Dizziness or lightheadedness    Medications       Take your medications as prescribed    Bring all your medications in their original bottles to every appointment    Avoid non-steroidal anti-inflammatory medications (Advil, Aleve, Ibuprofen, Naprosyn, Naproxen, Celebrex)    Do not stop taking your medications or begin taking over-the-counter or herbal medications without first talking to your doctor or nurse practitioner    Diet and Lifestyle       Limit sodium/salt to 2000 mg daily   o Read food labels for sodium content  o Do not add salt when cooking or add salt at the table    Weigh yourself every day and record in your daily weight log   o Call if you gain 2 pounds a day for 2 days in a row OR 5 pounds in 1  week  o Bring daily weight log to every appointment    Stay active, pace yourself, listen to your body, and rest when tired    Elevate your legs if they are swollen. Ask about using compression/support stockings    Stop smoking    Lose weight if you are overweight    Avoid drinking alcohol or limit amount    Stay updated on your immunizations including flu and pneumonia vaccines

## 2021-05-31 NOTE — TELEPHONE ENCOUNTER
ACN called and spoke with patient and she confirmed that Qasim Welsh MD is currently managing her INR's.      Priyanka Dee RN

## 2021-05-31 NOTE — TELEPHONE ENCOUNTER
Pt has CRT-P implant planned for 8/12  She has INR scheduled at [facility] on 8/9  She called today, as she missed taking her warfarin last night  She is wondering if she should take her dose now and/or double her warfarin  She takes 3mg daily  Advised pt to continue her normal dose of warfarin 3mg daily and not to double dose or take 2 times in one day  Also advised pt to continue with INR tomorrow as scheduled, and will also re-check INR on Monday 8/12  Discussed with pt risk of doubling dose or taking twice in one day may elevate INR with planned procedure on 8/12 above what is safe/exceptable  Pt verbalized understanding  Reviewed importance of compliance with this medication  Kristen  ----- Message from Caitie German sent at 8/8/2019  8:18 AM CDT -----  Contact: Patient  Caller: Helena    Primary cardiologist: Dr. Babcock    Detailed reason for call: Helena states she forgot to take her warfarin last night, what do you recommend? Please call back.     Best phone number: [phone]  Best time to contact: As soon as possible  Ok to leave a detailed message? Yes  Device? Yes, 2nd surgery scheduled 8/12/19

## 2021-05-31 NOTE — PATIENT INSTRUCTIONS - HE
Helena Brown,    It was a pleasure to see you today at the [facility].     My recommendations after this visit include:  - Please follow up with Dr Tom 6-8 weeks   - Please follow up with Dr Babcock in November  - Please follow up with Megan Wells as needed  - No changes to medications    Megan Wells CNP    What is the [facility]?     The [facility] Program is a heart failure specialty clinic within [facility].  You will work with your cardiologist, nurse practitioner, and nurses to carefully adjust medications and learn how to live with heart failure.  The Heart Failure Program will help you:      Better understand your chronic heart condition    Feel better and avoid hospital stays    Monitoring for Symptoms      Call the Heart Failure Phone Line ([phone]) if you have any of these symptoms:     Increased shortness of breath/shortness of breath at rest    Waking up at night with difficulty breathing    Unable to lie down for sleep due to symptoms or needing to sit upright for sleep    Weight gain of 2 pounds a day for 2 days in a row OR 5 pounds in 1 week    Increased swelling in your ankles or legs    Dizziness or lightheadedness    Medications       Take your medications as prescribed    Bring all your medications in their original bottles to every appointment    Avoid non-steroidal anti-inflammatory medications (Advil, Aleve, Ibuprofen, Naprosyn, Naproxen, Celebrex)    Do not stop taking your medications or begin taking over-the-counter or herbal medications without first talking to your doctor or nurse practitioner    Diet and Lifestyle       Limit sodium/salt to 2000 mg daily   o Read food labels for sodium content  o Do not add salt when cooking or add salt at the table    Weigh yourself every day and record in your daily weight log   o Call if you gain 2 pounds a day for 2 days in a row OR 5 pounds in 1 week  o Bring  daily weight log to every appointment    Stay active, pace yourself, listen to your body, and rest when tired    Elevate your legs if they are swollen. Ask about using compression/support stockings    Stop smoking    Lose weight if you are overweight    Avoid drinking alcohol or limit amount    Stay updated on your immunizations including flu and pneumonia vaccines

## 2021-05-31 NOTE — PATIENT INSTRUCTIONS - HE
Pacemaker Post-operative Checklist      The Device Registered Nurse (RN) reviewed the pacemaker function.      The Device RN did a wound assessment and wound care teaching.    Please call the Device Nurses with any signs of infection or questions regarding wound healing. Device Nurse Line: [phone], Option #3      The Device RN demonstrated and displayed the specific remote monitoring system for your pacemaker.      The Device RN reviewed the Partnership Agreement Form.    Patient Instructions    Do not lift your LEFT arm above the shoulder height, perform any vigorous arm movements such as swimming, golfing, washing windows, shoveling show, vacuuming or lifting greater than 10-15lbs with the affected arm for four weeks from the date of implant. September 9th.       To reduce the risk of infection, try to avoid any dental procedures for the first 6 weeks after your pacemaker implant. If you have an emergent or urgent dental need during this time, contact the device clinic for a prescription for an antibiotic.      You will receive a device identification (ID) card in the mail from the device  within 6 weeks to replace the temporary ID card you were given in the hospital.      You may travel by any mode of transportation; just show your pacemaker ID card. You may be subject to a hand search or use of a handheld wand, but official should not keep the wand over the implant site for greater than 5-10 seconds.      For any surgery, let your doctor know you have a pacemaker.       Your pacemaker is MRI safe       Most household appliances, including a microwave, will not interfere with your pacemaker function. If you suspect interference, simply move away from the source. Cell phones do not cause a problem. Please refer to the device booklet from the  or their website under the section on electromagnetic interference (DEMI) for further guidelines on things that may interfere with your pacemaker.        Device Clinic Contact Information  Device Nurses: [phone], Option #3    Device Remote Specialists: [phone], Option #2. For questions about your Remote Transmission or Transmission Schedule  Device Schedulers: [phone], Option #1

## 2021-05-31 NOTE — PROGRESS NOTES
Device Report:  Type: In office post-op CRT-P check (upgrade to CRT-P), wound assessment, and appt with Megan Wells CNP.  Presenting Rhythm: Atrial paced, biventricular paced 60bpm.  Lead/Battery status: Stable RA and RV. LV threshold increased from 1.7V @ 0.4ms to 3.4V @ 0.4ms (or 3.0V @ 0.9ms) LV3-LV4; patient denies PNS.   Arrhythmias: None.  Anticoagulant: warfarin.   Comments: LV testing done, no PNS up to 6V in the following configurations: LV1-Can: 3.3V @ 0.9ms; LV2-Can: 2.V @ 0.9ms; LV3-Can 2.0V @ 0.9ms; LV4-Can >2.4V @ 0.9ms; LV3-LV2 >3V @ 0.9ms. Due to high threshold in presenting configuration (LV3-LV4), reprogrammed to LV3-Can output 3.0V @ 0.9ms. Routing to Dr. Babcock. Reviewed PNS with patient and advised she call with any concerns. See post-op note. NJ

## 2021-05-31 NOTE — PROGRESS NOTES
Wound check with Dr. Babcock today. Per patient's report and Dr. Babcock's assessment redness is significantly improved. There are no signs of infection today and normal healing is occurring. It appears the patient has very thin skin. Leave incision open to air and try not to get very wet in the shower per Dr. Babcock.    INR 2.2    No new orders.    Return on Monday for device check and staples to be removed. NJ

## 2021-05-31 NOTE — PROGRESS NOTES
DEVICE CLINIC RN POST-OP NOTE    Reason for visit: Post-op biventricular pacemaker check and wound assessment     Device: [employer] Valitude CRT-P  Procedure date: 8/12/19  Implant Diagnosis: bradycardia, weakness, AVB, low EF  Implanting Physician: Dr. Aaron Babcock      Assessment  Subjective: Patient complains of numbness across the device pocket, this is primarily noticeable at night. She denies pain. A full assessment was performed by Megan Wells CNP.  Incision/device pocket: The incision was cleansed with betadine and then all staples were removed from the incision. There was minimally bleeding that resolved with pressure. Then the incision was cleansed with alcohol wipes. The incision is clean and intact. There are no signs of infection present.      Device Findings  Interrogation of device reveals normal sensing and capture thresholds  See the Paceart Report for a full summary of the device information.        Patient Education  Helena Brown was accompanied today by her , Mr. Brown.     [facility]'s Partnership Agreement for Device Patients and Post-operative Checklist were presented and reviewed with patient at today's appointment.    Signs and symptoms of infection, poor wound healing, and device function were reviewed. Range of motion and weight restrictions for the left side are four weeks. She can use the same remote monitor as before.    Plan  Remote on 9/13/19  Return in 3 months with Dr. Yoko Jimenez RN BSN  [facility]

## 2021-05-31 NOTE — TELEPHONE ENCOUNTER
She has been having issues with weight loss. I would have her follow up with her PMD regarding this. Is she drinking enough fluids during the day to stay hydrated? Recommend 50-60 oz per day. No further recs at this time. Thanks

## 2021-05-31 NOTE — PROGRESS NOTES
Patient seen in clinic for HF education s/p recent hospital discharge 08/13/19.  Reviewed HF Binder that includes the  HF Sx Awareness & Action plan  handout and  A Stronger Pump  booklet and Weight log booklet highlighting :   _X__Na management in diet  __X_importance of daily wts      Instructed patient in signs and sx of heart failure, reiterated when to call clinic - reviewed HF hotline # [phone] and after hours call # [phone].  Majority of time was spent reviewing: weights daily, log of weights, signs and symptoms of heart failure, low sodium diet, label reading and signs/symptoms action plan.     Patient verbalized understanding of HF discussion. Opportunity to ask questions and have them answered.     No formal f/u scheduled with nurse clinician for continued education - will continue to reinforce HF management education. Appointment upcoming with MAT 6-8 weeks. Device/EP in 11/19. Encouraged to call writer if questions. JESSICARN

## 2021-05-31 NOTE — TELEPHONE ENCOUNTER
Phone call to Helena. Unfortunately I have not heard back from Dr. Babcock.     Will send a note to him to review her chart.     Also, mentioned Megan's recommendations below.     Yesenia Jimenez RN BSN  [facility] [place] [facility] Device [facility]

## 2021-05-31 NOTE — TELEPHONE ENCOUNTER
Received a faxed INR result for Helena KESHIA Brown  From [facility]  INR result dated 8/16/2019 is 2.2  Patient is not currently enrolled in ACM Program.    Per note result was faxed to Qasim Welsh MD at [facility].    Priyanka Dee RN

## 2021-05-31 NOTE — PROGRESS NOTES
Pre-Intra-Post education and instructions as listed below were reviewed with Patient via phone  INR 7/5- 1.9  8/7/2019 12:28 PM  Yvette Deleon RN

## 2021-06-02 ENCOUNTER — RECORDS - HEALTHEAST (OUTPATIENT)
Dept: ADMINISTRATIVE | Facility: CLINIC | Age: 81
End: 2021-06-02

## 2021-06-02 VITALS — BODY MASS INDEX: 21.03 KG/M2 | WEIGHT: 115 LBS

## 2021-06-02 VITALS — BODY MASS INDEX: 19.56 KG/M2 | HEIGHT: 62 IN | WEIGHT: 106.31 LBS

## 2021-06-02 VITALS — HEIGHT: 62 IN | WEIGHT: 114 LBS | BODY MASS INDEX: 20.98 KG/M2

## 2021-06-02 VITALS — BODY MASS INDEX: 20.1 KG/M2 | HEIGHT: 62 IN | WEIGHT: 109.25 LBS

## 2021-06-02 NOTE — PROGRESS NOTES
Pulmonary Clinic Outpatient Consultation    Assessment and Plan:   79 year old lady with history of PE in April 2019, CAD, pacemaker, presents for evaluation of the above issues. She has been on coumadin for 6 months and has been tolerating it. The PE was mostly provoked in the setting of recent hospitalization for complete heart block requiring PPM placement. Her follow up VQ scan and echocardiogram are encouraging. I think she can safely stop coumadin at this point per ACCP antithrombotic guidelines since she has completed 6 months of anticoagulation.    Recommendations:  - OK to stop the coumadin  - she'll let us know if she experiences any chest pain, shortness of breath or tachycardia    Follow up as needed.      CCx: hospital discharge follow up,  History of PE    HPI: 79 year old lady with history of PE in April 2019, CAD, pacemaker, presents for evaluation of shortness of breath and history of PE. She had a pacemaker placed last March for severe bradycardia and complete heart block. She was in the hospital for 2-3 days and was apparently discharged in stable condition. She then came back to the ER in late April 2019, about a month after pacemaker placement. She was found to have small acute PE's without right heart strain.   She was started on LMWH and coumadin and discharged (was not admitted, sent home from the ER).  A follow up echo in July showed normal TAPSE and RV function  A VQ scan in July showed low probability of PE. She had a negative DVT study in April 2019.  Today she says she's generally doing well. She exercises regularly and climbs stairs without dyspnea on exertion. Denies chest pain or pressure. She had her pacer upgraded in August to a bi-V pacer due to history of CHF with reduced EF.  She has no significant exercise limitation, but does admit to some dyspnea with heavy exertion.   She is wondering If she can stop her coumadin.    ROS:  A 12-system review was obtained and was negative with  the exception of the symptoms endorsed in the history of present illness.    PMH:  Past Medical History:   Diagnosis Date     Anxiety      Arthritis      Asthma      Colitis     Collagenous     Complete heart block (H)      Hypertriglyceridemia      Macular degeneration      Osteopenia      Osteoporosis      Pacemaker      Restless legs      Skin cancer, basal cell        PSH:  Past Surgical History:   Procedure Laterality Date     APPENDECTOMY       BREAST EXCISIONAL BIOPSY Right [id number]     BREAST SURGERY      biopsy     BUNIONECTOMY       EP PACEMAKER INSERT N/A 3/29/2019    Aaron Babcock MD;  Location: [facility] Lab;  Service: Cardiology     IR EXTREMITY VENOGRAM LEFT  7/25/2019     NH TOTAL KNEE ARTHROPLASTY Left 8/30/2018    Procedure:  LEFT MINIMALLY INVASIVE TOTAL KNEE ARTHROPLASTY;  Surgeon: Segun Banks MD;  Location: [facility];  Service: Orthopedics     NH TOTAL KNEE ARTHROPLASTY Right 11/8/2018    Procedure: RIGHT MINIMALLY INVASIVE TOTAL KNEE ARTHROPLASTY;  Surgeon: Segun Banks MD;  Location: [facility];  Service: Orthopedics     ROTATOR CUFF REPAIR       TONSILLECTOMY         Allergies:  Allergies   Allergen Reactions     Nsaids (Non-Steroidal Anti-Inflammatory Drug) Other (See Comments)     Patient has history of microcolitis.  GI aggravation     Sulfa (Sulfonamide Antibiotics)      Eye drops caused burning       Family HX:  Family History   Problem Relation Age of Onset     Breast cancer Cousin      Heart disease Mother      Alzheimer's disease Father      Clotting disorder Neg Hx        Social Hx:  Social History     Socioeconomic History     Marital status:      Spouse name: Not on file     Number of children: Not on file     Years of education: Not on file     Highest education level: Not on file   Occupational History     Not on file   Social Needs     Financial resource strain: Not on file     Food insecurity:     Worry: Not on file     Inability:  Not on file     Transportation needs:     Medical: Not on file     Non-medical: Not on file   Tobacco Use     Smoking status: Former Smoker     Packs/day: 0.50     Years: 2.00     Pack years: 1.00     Last attempt to quit: 1960     Years since quittin.9     Smokeless tobacco: Never Used   Substance and Sexual Activity     Alcohol use: Yes     Alcohol/week: 7.0 standard drinks     Types: 7 Glasses of wine per week     Comment: per week     Drug use: No     Sexual activity: Not on file   Lifestyle     Physical activity:     Days per week: Not on file     Minutes per session: Not on file     Stress: Not on file   Relationships     Social connections:     Talks on phone: Not on file     Gets together: Not on file     Attends [employer] service: Not on file     Active member of club or organization: Not on file     Attends meetings of clubs or organizations: Not on file     Relationship status: Not on file     Intimate partner violence:     Fear of current or ex partner: Not on file     Emotionally abused: Not on file     Physically abused: Not on file     Forced sexual activity: Not on file   Other Topics Concern     Not on file   Social History Narrative     Not on file       Current Meds:  Current Outpatient Medications   Medication Sig Dispense Refill     acetaminophen (TYLENOL) 500 MG tablet Take 1,000 mg by mouth 2 (two) times a day.        bismuth subsalicylate (BISMUTH SUBSALICYLATE) 262 mg Chew chew tab Chew 3 tablets 3 (three) times a day.        CALCIUM CITRATE ORAL Take 600 mg by mouth 2 (two) times a day.        cholecalciferol, vitamin D3, (VITAMIN D3) 1,000 unit capsule Take 1,000 Units by mouth daily.       cyanocobalamin 1000 MCG tablet Take 500 mcg by mouth daily.       gabapentin (NEURONTIN) 300 MG capsule Take 300-600 mg by mouth at bedtime.              KRILL OIL ORAL Take 1 capsule by mouth daily.        magnesium oxide 250 mg Tab Take 250 mg by mouth at bedtime.        warfarin  "(COUMADIN/JANTOVEN) 3 MG tablet Take 1 tablet (3 mg total) by mouth daily. (Patient taking differently: Take 4.5mg on Mondays, Wednesdays and Fridays; and 3mg all other days of the week      ) 30 tablet      No current facility-administered medications for this visit.        Physical Exam:  /62   Pulse 63   Resp 16   Ht 5' 2\" (1.575 m)   Wt 110 lb (49.9 kg)   SpO2 98% Comment: RA  BMI 20.12 kg/m    Gen: awake, alert, oriented, no distress  HEENT: nasal turbinates are unremarkable, no oropharyngeal lesions, no cervical or supraclavicular lymphadenopathy  CV: RRR, no M/G/R  Resp: CTAB, no focal crackles or wheezes  Abd: soft, nontender, no palpable organomegaly  Skin: no apparent rashes  Ext: no cyanosis, clubbing or edema  Neuro: alert, nonfocal    Labs:  Reviewed  CBC, chem panel unremarkable.    Imaging studies:  VQ scan  IMPRESSION:   CONCLUSION:  1.  Low probability of pulmonary emboli.    CTA chest April 2019  IMPRESSION:   CONCLUSION:  1.  Small acute pulmonary emboli in a segmental pulmonary artery right lower lobe and left upper lobe. No central emboli. Borderline enlargement central pulmonary arteries but no definite CT signs of right heart strain. Discussed with Dr. Mitchell 6:50   PM 04/26/2019.  2.  Mild cylindrical bronchiectasis.    Echo July 2019    1.Left ventricle ejection fraction is moderately decreased. The calculated left ventricular ejection fraction is 44%.    2.TAPSE is normal, which is consistent with normal right ventricular systolic function.    3.Mild tricuspid, mild to moderate pulmonic insufficiency, trace mitral regurgitation and mild to moderate aortic insufficiency.    4.When compared to the previous study dated 6/4/2019, small circumferential pericardial effusion has resolved.    US legs  IMPRESSION:   CONCLUSION:  1.  Bilateral leg veins are negative for DVT    PFT's   None available    Norbert Melvin MD (Avi)  [facility] Pulmonary & Critical Care  Pager [phone] " [phone]  [facility] [phone]

## 2021-06-03 ENCOUNTER — AMBULATORY - HEALTHEAST (OUTPATIENT)
Dept: CARDIOLOGY | Facility: CLINIC | Age: 81
End: 2021-06-03

## 2021-06-03 VITALS — BODY MASS INDEX: 21.16 KG/M2 | WEIGHT: 115 LBS | HEIGHT: 62 IN

## 2021-06-03 VITALS — WEIGHT: 109 LBS | HEIGHT: 62 IN | BODY MASS INDEX: 20.06 KG/M2

## 2021-06-03 VITALS
HEART RATE: 63 BPM | BODY MASS INDEX: 20.24 KG/M2 | OXYGEN SATURATION: 98 % | DIASTOLIC BLOOD PRESSURE: 62 MMHG | SYSTOLIC BLOOD PRESSURE: 104 MMHG | WEIGHT: 110 LBS | HEIGHT: 62 IN | RESPIRATION RATE: 16 BRPM

## 2021-06-03 VITALS — WEIGHT: 108.5 LBS | HEIGHT: 62 IN | BODY MASS INDEX: 19.96 KG/M2

## 2021-06-03 VITALS
RESPIRATION RATE: 16 BRPM | HEIGHT: 62 IN | WEIGHT: 111 LBS | BODY MASS INDEX: 20.43 KG/M2 | HEART RATE: 68 BPM | SYSTOLIC BLOOD PRESSURE: 102 MMHG | DIASTOLIC BLOOD PRESSURE: 62 MMHG

## 2021-06-03 VITALS — WEIGHT: 107 LBS | BODY MASS INDEX: 18.96 KG/M2 | HEIGHT: 63 IN

## 2021-06-03 VITALS — BODY MASS INDEX: 20.43 KG/M2 | WEIGHT: 111 LBS | HEIGHT: 62 IN

## 2021-06-03 VITALS — HEIGHT: 62 IN | WEIGHT: 105.4 LBS | BODY MASS INDEX: 19.4 KG/M2

## 2021-06-03 VITALS — WEIGHT: 111 LBS | HEIGHT: 62 IN | BODY MASS INDEX: 20.43 KG/M2

## 2021-06-03 VITALS — HEIGHT: 62 IN | WEIGHT: 111 LBS | BODY MASS INDEX: 20.43 KG/M2

## 2021-06-03 VITALS — HEIGHT: 62 IN | WEIGHT: 109 LBS | BODY MASS INDEX: 20.06 KG/M2

## 2021-06-03 VITALS — HEIGHT: 63 IN | BODY MASS INDEX: 19.49 KG/M2 | WEIGHT: 110 LBS

## 2021-06-03 VITALS — BODY MASS INDEX: 18.96 KG/M2 | HEIGHT: 63 IN | WEIGHT: 107 LBS

## 2021-06-03 VITALS — WEIGHT: 112 LBS | HEIGHT: 62 IN | BODY MASS INDEX: 20.61 KG/M2

## 2021-06-03 DIAGNOSIS — I50.21 ACUTE HFREF (HEART FAILURE WITH REDUCED EJECTION FRACTION) (H): ICD-10-CM

## 2021-06-03 DIAGNOSIS — I44.2 COMPLETE AV BLOCK (H): ICD-10-CM

## 2021-06-03 DIAGNOSIS — I45.9 HEART BLOCK: ICD-10-CM

## 2021-06-03 DIAGNOSIS — Z95.0 CARDIAC PACEMAKER IN SITU: ICD-10-CM

## 2021-06-03 NOTE — PROGRESS NOTES
[facility] Heart Care Note  :  Assessment:  Complete  heart block with profound bradycardia-symptomatic  Dual-chamber [employer] pacemaker implanted March 29, 2019   dyssynchrony from left bundle branch block or from pacemaker although ventricular pacing is at 33%  Pacemaker upgrade to CRT-P; August 12, 2019. Excellent QRS pattern   pulmonary embolism; possible from left subclavian vein occlusion-lots of collaterals around the left subclavian region  Leg ultrasound normal  warfarin stopped   No history of myocardial infarction; coronary CT angiogram May 7, 2019 showed moderate coronary artery stenosis with RCA 50-69%  Pulmonary function studies normal including normal diffusion capacity    Plan:    Pacemaker evaluation today is excellent  EKG pattern with resynchronization is excellent-rather narrow QRS  Pulmonary consultation from 10/24/2019 was reviewed-advised to stop warfarin  Continue to have device check through transtelephonic monitoring every 3 months  Obtain echocardiogram in 6 months  Follow-up in 6 months for preventive cardiac arrhythmia device assessment          Subjective:    I had the opportunity to see this 79-year-old with a history of dyspnea on exertion and heart block and pacemaker situation     Patient had a dual-chamber pacemaker then developed high-grade heart block with underlying left bundle branch block.  Her ejection fraction dropped and she is felt to have pacemaker or left bundle branch block induced dyssynchrony  She underwent pacemaker upgrade to a CRT-P system August 12, 2019  Tolerated procedure without event has made a nice recovery.    She feels much improved her symptoms of dyspnea on exertion and exercise intolerance are pretty much resolved  I reviewed her pulmonary consultation from October 24, 2019-no evidence for lung disease was identified she was advised to stop warfarin-previously was on warfarin because of deep vein thrombosis and small pulmonary embolism    EKG after resynchronization was nearly normal-quite a narrow pattern insistent with excellent resynchronization        Problem List:  Patient Active Problem List   Diagnosis     Osteoporosis     Degenerative arthritis of left knee     Restless leg syndrome     Elevated blood pressure reading without diagnosis of hypertension     Hyperkalemia     Degenerative arthritis of right knee     Heart block     Complete AV block (H)     Exertional dyspnea     Cardiac pacemaker, CRT-P, in situ     Acute HFrEF (heart failure with reduced ejection fraction) (H)     Other pulmonary embolism without acute cor pulmonale (H)     Decreased cardiac ejection fraction     LBBB (left bundle branch block)     Shortness of breath     Pulmonary embolism (H)     Medical History:  Past Medical History:   Diagnosis Date     Anxiety      Arthritis      Asthma      Colitis     Collagenous     Complete heart block (H)      Hypertriglyceridemia      Macular degeneration      Osteopenia      Osteoporosis      Pacemaker      Pulmonary embolism (H)      Restless legs      Skin cancer, basal cell      Surgical History:  Past Surgical History:   Procedure Laterality Date     APPENDECTOMY       BREAST EXCISIONAL BIOPSY Right [id number]     BREAST SURGERY      biopsy     BUNIONECTOMY       EP PACEMAKER INSERT N/A 3/29/2019    Aaron Babcock MD;  Location: [facility] Cath Lab;  Service: Cardiology     IR EXTREMITY VENOGRAM LEFT  7/25/2019     NV TOTAL KNEE ARTHROPLASTY Left 8/30/2018    Procedure:  LEFT MINIMALLY INVASIVE TOTAL KNEE ARTHROPLASTY;  Surgeon: Segun Banks MD;  Location: [facility];  Service: Orthopedics     NV TOTAL KNEE ARTHROPLASTY Right 11/8/2018    Procedure: RIGHT MINIMALLY INVASIVE TOTAL KNEE ARTHROPLASTY;  Surgeon: Segun Banks MD;  Location: [facility];  Service: Orthopedics     ROTATOR CUFF REPAIR       TONSILLECTOMY       Social History:  Social History     Socioeconomic History     Marital status:       Spouse name: Not on file     Number of children: Not on file     Years of education: Not on file     Highest education level: Not on file   Occupational History     Not on file   Social Needs     Financial resource strain: Not on file     Food insecurity:     Worry: Not on file     Inability: Not on file     Transportation needs:     Medical: Not on file     Non-medical: Not on file   Tobacco Use     Smoking status: Former Smoker     Packs/day: 0.50     Years: 2.00     Pack years: 1.00     Last attempt to quit: 1960     Years since quittin.0     Smokeless tobacco: Never Used   Substance and Sexual Activity     Alcohol use: Yes     Alcohol/week: 7.0 standard drinks     Types: 7 Glasses of wine per week     Comment: per week     Drug use: No     Sexual activity: Not on file   Lifestyle     Physical activity:     Days per week: Not on file     Minutes per session: Not on file     Stress: Not on file   Relationships     Social connections:     Talks on phone: Not on file     Gets together: Not on file     Attends Rastafarian service: Not on file     Active member of club or organization: Not on file     Attends meetings of clubs or organizations: Not on file     Relationship status: Not on file     Intimate partner violence:     Fear of current or ex partner: Not on file     Emotionally abused: Not on file     Physically abused: Not on file     Forced sexual activity: Not on file   Other Topics Concern     Not on file   Social History Narrative     Not on file       Review of Systems:      General: WNL  Eyes: WNL  Ears/Nose/Throat: WNL  Lungs: WNL  Heart: WNL  Stomach: WNL  Bladder: WNL  Muscle/Joints: WNL  Skin: WNL  Nervous System: WNL  Mental Health: WNL     Blood: WNL        Family History:  Family History   Problem Relation Age of Onset     Breast cancer Cousin      Heart disease Mother      Alzheimer's disease Father      Clotting disorder Neg Hx          Allergies:  Allergies   Allergen  "Reactions     Nsaids (Non-Steroidal Anti-Inflammatory Drug) Other (See Comments)     Patient has history of microcolitis.  GI aggravation     Sulfa (Sulfonamide Antibiotics)      Eye drops caused burning       Medications:  Current Outpatient Medications   Medication Sig Dispense Refill     acetaminophen (TYLENOL) 500 MG tablet Take 1,000 mg by mouth daily.              bismuth subsalicylate (BISMUTH SUBSALICYLATE) 262 mg Chew chew tab Chew 3 tablets 3 (three) times a day.        CALCIUM CITRATE ORAL Take 600 mg by mouth 2 (two) times a day.        cholecalciferol, vitamin D3, (VITAMIN D3) 1,000 unit capsule Take 1,000 Units by mouth daily.       gabapentin (NEURONTIN) 300 MG capsule Take 300-600 mg by mouth at bedtime.              KRILL OIL ORAL Take 1 capsule by mouth every other day. DHA 200mg every other day             magnesium oxide 250 mg Tab Take 250 mg by mouth at bedtime.        cyanocobalamin 1000 MCG tablet Take 500 mcg by mouth daily.       warfarin (COUMADIN/JANTOVEN) 3 MG tablet Take 1 tablet (3 mg total) by mouth daily. (Patient taking differently: Take 4.5mg on Mondays, Wednesdays and Fridays; and 3mg all other days of the week      ) 30 tablet      No current facility-administered medications for this visit.          Surgical site  Very thin  Device is well-healed  Can feel the leads even the sewing barrel test under the skin but no tissue breakdown    Device interrogation  Underlying rhythm is heart block to the one pattern with ventricular rate of 31  21% atrial pacing  100% biventricular pacing with an excellent histogram and heart rate distribution  No atrial arrhythmias  No ventricular arrhythmias  Lead function satisfactory  Battery good for another 10 years    Objective:   Vital signs:  /62 (Patient Site: Left Arm, Patient Position: Sitting, Cuff Size: Adult Regular)   Pulse 68   Resp 12   Ht 5' 2\" (1.575 m)   Wt 111 lb 4.8 oz (50.5 kg)   BMI 20.36 kg/m    Thin woman; " underweight     Physical Exam:      GENERAL APPEARANCE: Alert, cooperative and in no acute distress.  HEENT: No scleral icterus. No Xanthelasma. Oral mucous membranes pink and moist.  NECK: JVP normal cm. No Hepatojugular reflux. Thyroid not  Palpable  CHEST: clear to auscultation and percussion  CARDIOVASCULAR: S1, S2 without murmur    Brachial, radial  pulses are intact and symmetric.   No carotid bruits noted.  ABDOMEN: Non tender. BS+. No bruits.  EXTREMITIES: No cyanosis, clubbing or edema.    Lab Results:  LIPIDS:  Lab Results   Component Value Date    CHOL 182 06/06/2019    CHOL 179 09/12/2018    CHOL 226 (H) 12/16/2015     Lab Results   Component Value Date    HDL 53 06/06/2019    HDL 61 09/12/2018    HDL 84 12/16/2015     Lab Results   Component Value Date    LDLCALC 113 06/06/2019    LDLCALC 105 09/12/2018    LDLCALC 106 12/16/2015     Lab Results   Component Value Date    TRIG 82 06/06/2019    TRIG 67 09/12/2018    TRIG 180 (H) 12/16/2015     No components found for: CHOLHDL    BMP:  Lab Results   Component Value Date    CREATININE 0.84 08/12/2019    BUN 20 08/12/2019     08/12/2019    K 3.9 08/12/2019     08/12/2019    CO2 31 08/12/2019         This note has been dictated using voice recognition software. Any grammatical or context distortions are unintentional and inherent to the software.  Aaron Babcock MD  [facility]  [phone]

## 2021-06-03 NOTE — PATIENT INSTRUCTIONS - HE
Helena Brown    Thank you for coming to the [facility] today for a cardiac evaluation  It was my pleasure to see you today  A good contact for any questions would be Yvette Deleon  RN @ [phone]    Pacemaker evaluation today is excellent  EKG pattern with resynchronization is excellent-rather narrow QRS  Pulmonary consultation from 10/24/2019 was reviewed-advised to stop warfarin  Continue to have device check through transtelephonic monitoring every 3 months  Obtain echocardiogram in 6 months  Follow-up in 6 months for preventive cardiac arrhythmia device assessment

## 2021-06-04 VITALS
RESPIRATION RATE: 16 BRPM | BODY MASS INDEX: 20.43 KG/M2 | DIASTOLIC BLOOD PRESSURE: 60 MMHG | WEIGHT: 111 LBS | HEIGHT: 62 IN | HEART RATE: 68 BPM | SYSTOLIC BLOOD PRESSURE: 100 MMHG

## 2021-06-04 VITALS
RESPIRATION RATE: 12 BRPM | WEIGHT: 111.3 LBS | DIASTOLIC BLOOD PRESSURE: 62 MMHG | SYSTOLIC BLOOD PRESSURE: 112 MMHG | HEART RATE: 68 BPM | HEIGHT: 62 IN | BODY MASS INDEX: 20.48 KG/M2

## 2021-06-04 VITALS — WEIGHT: 111 LBS | BODY MASS INDEX: 20.43 KG/M2 | HEIGHT: 62 IN

## 2021-06-05 VITALS
HEART RATE: 80 BPM | BODY MASS INDEX: 20.3 KG/M2 | WEIGHT: 111 LBS | DIASTOLIC BLOOD PRESSURE: 70 MMHG | SYSTOLIC BLOOD PRESSURE: 100 MMHG

## 2021-06-06 NOTE — PROGRESS NOTES
"Type: routine remote CRT-P transmission.  Presenting rhythm: AP-biVP, and AS-biVP, rate 61 bpm.  Battery/lead status: stable, LV impedance is trending up.  Arrhythmias: since 11/20/19, no AT/AF detected. One ventricular high rate episode on 12/26/19, 10-bt NSVT rate 180bpm (last EF 44%).  Comments: normal magnet and pacemaker function. BiV pacing 100%. Routed to device RN. Prd      Transmission reviewed, agree with above. Upgrade to CRT-P device done in 8/2019 with Dr. Bacbock. Last Echo at that time showed EF 44%, is due for OV and repeat Echo with Dr. Babcock in May 2020. Reviewed results with patient, she denies any unusual lightheadedness. States she does note every once in a while in bed she can feel her heart beating \"all the way across my back\". Always seems to happen when she is lying in bed and very early in the morning, lasts about a 1-2 minutes and then is gone. Does not appear the LV auto-cap testing is on, unsure what this is exactly that she is feeling, States it is not bothersome. Can do further testing at next clinic visit.      Will review with Dr. Babcock to see if any other recommendations before upcoming visit.   Swathi Molina RN           NSVT from 12/26/19 at ~10 AM:  RS reviewed; will continue with routine follow up  "

## 2021-06-08 NOTE — PROGRESS NOTES
Review of Systems - History obtained from the patient  General ROS: negative  Psychological ROS: negative  Ophthalmic ROS: negative  ENT ROS: negative  Hematological and Lymphatic ROS: positive for - easy bruising  Respiratory ROS: negative  Cardiovascular ROS: negative  Gastrointestinal ROS: negative  Genito-Urinary ROS: negative  Musculoskeletal ROS: positive for - joint pain, muscle pain and muscular weakness  Neurological ROS: positive for - confusion, loss of balance, and daytime sleepiness  Dermatological ROS: negative

## 2021-06-10 NOTE — TELEPHONE ENCOUNTER
Wellness Screening Tool  Symptom Screening:  Do you have one of the following NEW symptoms:    Fever (subjective or >100.0)?  No    A new cough?  No    Shortness of breath?  No     Chills? No     New loss of taste or smell? No     Generalized body aches? No     New persistent headache? No     New sore throat? No     Nausea, vomiting, or diarrhea?  No    Within the past 3 weeks, have you been exposed to someone with a known positive illness below:    COVID-19 (known or suspected)?  No    Chicken pox?  No    Mealses?  No    Pertussis?  No    Patient notified of visitor policy- They may have one person accompany them to their appointment, but they will need to wear a mask and will be screened upon arrival for symptoms: Yes  Pt informed to wear a mask: Yes  Pt notified if they develop any symptoms listed above, prior to their appointment, they are to call the clinic directly at [phone] for further instructions.  Yes  Patient's appointment status: Patient will be seen in clinic as scheduled on 8/28

## 2021-06-11 NOTE — PROGRESS NOTES
ASSESSMENT AND PLAN:  Helnea Brown 80 y.o. female is seen here on 09/29/20 for evaluation of polyarthralgias.  That she has osteoarthritis is been well-documented, treated in the past.  She is status post bilateral TKAs.  The question of what happened during this past few weeks where she had a superimposed new issue.  This requires further looking into.  She has history of collagenous colitis.  We will check labs and x-rays as noted.  Get together on a virtual visit in the next 2 weeks.  Diagnoses and all orders for this visit:    Polyarthralgia  -     Rheumatoid Factor Quant  -     CCP Antibodies  -     Hepatitis C Antibody (Anti-HCV)  -     Antinuclear Antibody (RAGHAV) Cascade  -     Uric Acid  -     XR Hands Bilateral 3 or More VWS; Future; Expected date: 09/29/2020  -     XR Hands Bilateral 3 or More VWS    Primary osteoarthritis involving multiple joints  -     XR Hands Bilateral 3 or More VWS; Future; Expected date: 09/29/2020  -     XR Hands Bilateral 3 or More VWS      HISTORY OF PRESENTING ILLNESS:  Helena Brown, 80 y.o., female is here for evaluation of painful joints predominantly in her hands.  She reports that she has longstanding changes in her hand joints spanning several years where she would have intermittent discomfort, deformity, somewhat similar to how her mom's hands evolved over time.  Nonetheless she was able to do most of her day-to-day activities.  Approximately 2-1/2 months ago she experienced worsening pain.  This interfered with her ability to move her digits, make a fist, which is not easy anyway to begin with given the deformities but has become significantly harder.  She was unable for example to oppose she demonstrated the left middle finger to the thumb.  This was throughout the day.  She has not been able to take nonsteroidals because of collagenous colitis.  She takes Tylenol which provides only marginal benefit if at all.  Over the past couple of weeks the things have improved.   She has noted no other joint areas are similarly affected.  She has had bilateral knee replacement.  She has had a rotator cuff tear on the right rotator cuff where she has had surgery once.  She describes herself otherwise in good health she does have a pacemaker in place.  He describes no personal or family history of psoriasis ulcerative colitis Crohn's disease.  There is no family history of rheumatoid arthritis, lupus.  She retired from [employer].    Further historical information and ADL limitations as noted in the multidimensional health assessment questionnaire attached in the EMR. Rest of the 13 system ROS is negative.     ALLERGIES:Nsaids (non-steroidal anti-inflammatory drug) and Sulfa (sulfonamide antibiotics)    PAST MEDICAL/ACTIVE PROBLEMS/MEDICATION/ FAMILY HISTORY/SOCIAL DATA:  The patient has a family history of  Past Medical History:   Diagnosis Date     Anxiety      Arthritis      Asthma      Colitis     Collagenous     Complete heart block (H)      Hypertriglyceridemia      Macular degeneration      Osteopenia      Osteoporosis      Pacemaker      Pulmonary embolism (H)      Restless legs      Skin cancer, basal cell      Social History     Tobacco Use   Smoking Status Former Smoker     Packs/day: 0.50     Years: 2.00     Pack years: 1.00     Last attempt to quit: 1960     Years since quittin.9   Smokeless Tobacco Never Used     Patient Active Problem List   Diagnosis     Osteoporosis     Degenerative arthritis of left knee     Restless leg syndrome     Elevated blood pressure reading without diagnosis of hypertension     Hyperkalemia     Degenerative arthritis of right knee     Heart block     Complete AV block (H)     Exertional dyspnea     Cardiac pacemaker, CRT-P, in situ     Acute HFrEF (heart failure with reduced ejection fraction) (H)     Other pulmonary embolism without acute cor pulmonale (H)     Decreased cardiac ejection fraction     LBBB (left bundle branch block)      Shortness of breath     Pulmonary embolism (H)     Current Outpatient Medications   Medication Sig Dispense Refill     acetaminophen (TYLENOL) 500 MG tablet Take 1,000 mg by mouth as needed for pain.        amoxicillin (AMOXIL) 500 MG capsule TAKE 4 CAPSULES BY MOUTH 1 HOUR BEFORE DENTAL VISIT       aspirin (ASPIR-81) 81 MG EC tablet Take 81 mg by mouth daily.       atorvastatin (LIPITOR) 20 MG tablet Take 1 tablet (20 mg total) by mouth at bedtime. 90 tablet 5     bismuth subsalicylate (BISMUTH SUBSALICYLATE) 262 mg Chew chew tab Chew 3 tablets 3 (three) times a day.        CALCIUM CITRATE ORAL Take 600 mg by mouth 2 (two) times a day.        cholecalciferol, vitamin D3, (VITAMIN D3) 1,000 unit capsule Take 1,000 Units by mouth daily.       cyanocobalamin 1000 MCG tablet Take 500 mcg by mouth 2 (two) times a week.        gabapentin (NEURONTIN) 300 MG capsule Take 300-600 mg by mouth at bedtime.              KRILL OIL ORAL Take 1 capsule by mouth every other day. DHA 200mg every other day             magnesium oxide 250 mg Tab Take 250 mg by mouth at bedtime.        No current facility-administered medications for this visit.        COMPREHENSIVE EXAMINATION:  Vitals:    09/29/20 1202   BP: 100/70   Patient Site: Right Arm   Patient Position: Sitting   Cuff Size: Child   Pulse: 80   Weight: 111 lb (50.3 kg)     A well appearing alert oriented female. Vital data as noted above. Her eyes without inflammation/scleromalacia. ENTwithout oral mucositis, thrush, nasal deformity, external ear redness, deformity. Her neck is without lymphadenopathy and supple. Lungs normal sounds, no pleural rub. Heart auscultation normal rate, rhythm; no pericardial rub and murmurs. Abdomen soft, non tender, no organomegaly. Skin examined for heliotrope, malar area eruption, lupus pernio, periungual erythema, sclerodactyly, papules, erythema nodosum, purpura, nail pitting, onycholysis, and obvious psoriasis lesion. Neurological examination  shows normal alertness, speech, facial symmetry, tone and power in upper and lower extremities. The joint examination is performed for swelling, tenderness, warmth, erythema, and range of motion in the following joints: DIPs, PIPs, MCPs, wrists, first CMC's, elbows, shoulders, hips, knees, ankles, feet; spine for range of motion and paraspinal muscles for tenderness. The salient  findings are: She has several changes in her hands with Heberden's and Steph's, squaring of the first CMC's, Steph's most prominent in the middle of the ring finger, she has associated deformities, she does not have dactylitis of digits she has Heberden's.  She has TKAs bilaterally.  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  She does not have dactylitis of digits or toes.  There is no enthesitis such as the Achilles.    LAB / IMAGING DATA:  ALT   Date Value Ref Range Status   07/23/2020 18 0 - 45 U/L Final   04/23/2019 12 0 - 45 U/L Final   03/28/2019 25 0 - 45 U/L Final     Albumin   Date Value Ref Range Status   07/23/2020 3.8 3.5 - 5.0 g/dL Final   06/06/2019 3.3 (L) 3.5 - 5.0 g/dL Final   04/23/2019 3.7 3.5 - 5.0 g/dL Final     Creatinine   Date Value Ref Range Status   07/23/2020 0.82 0.60 - 1.10 mg/dL Final   08/12/2019 0.84 0.60 - 1.10 mg/dL Final   06/06/2019 0.74 0.60 - 1.10 mg/dL Final       WBC   Date Value Ref Range Status   08/28/2020 5.5 4.0 - 11.0 thou/uL Final   08/12/2019 5.3 4.0 - 11.0 thou/uL Final     Hemoglobin   Date Value Ref Range Status   08/28/2020 12.1 12.0 - 16.0 g/dL Final   08/12/2019 12.2 12.0 - 16.0 g/dL Final   07/09/2019 11.0 (L) 12.0 - 16.0 g/dL Final     Platelets   Date Value Ref Range Status   08/28/2020 174 140 - 440 thou/uL Final   08/12/2019 210 140 - 440 thou/uL Final   06/04/2019 329 140 - 440 thou/uL Final       Lab Results   Component Value Date    SEDRATE 12 07/23/2020

## 2021-06-11 NOTE — PROGRESS NOTES
[facility] Daily Progress     Patient Name: Helena Brown  Date: 2017  Visit #: 3/12    Referral Diagnosis: R Shld pain  Referring provider: Han Hernandez DO  Visit Diagnosis:     ICD-10-CM    1. Pain in joint of right shoulder M25.511    2. Rotator cuff tear arthropathy of right shoulder M12.811    3. Osteoporosis M81.0          Assessment:     Patient demonstrates understanding/independence with home program.  Patient is benefitting from skilled physical therapy and is making steady progress toward functional goals.  Patient is appropriate to continue with skilled physical therapy intervention, as indicated by initial plan of care.    Goal Status:  Pt. will demonstrate/verbalize independence in self-management of condition in : 6 weeks  Pt. will be independent with home exercise program in : 6 weeks  Patient will reach / maintain arm movement: forward;overhead;behind;for home chores;for dressing;with partial ROM;with less pain;in 6 weeks  Patient will perform repetitive movement in : hand;for other activity;with less pain;with less difficulty;in 6 weeks  for other activity: For typing activities  Patient will be able to: for writing;gripping;holding;for lifting;for dressing;for grooming/hygiene;with less pain;with less difficulty;in 6 weeks    Plan / Patient Education:     Continue with initial plan of care.    Subjective:     Pain Ratin-2/10  Pain with motion and at ends of ROM      Objective:     ROM to R Shld  Flex  0-147  abd  0-135   ER 0-80,    IR 0-47       Treatment Today     TREATMENT MINUTES COMMENTS   Evaluation     Self-care/ Home management     Manual therapy     Neuromuscular Re-education     Therapeutic Activity     Therapeutic Exercises 30 Review balloon breathing ex.,  Ball on the wall exercises  Took measurements of R shld ROM    Pt shows improvement in ROM to the R shld   Except IR secondary to her OA condition   Pt Ind in exercises   Gait training      Modality__________________                Total 30    Blank areas are intentional and mean the treatment did not include these items.       Paulo Ovalles  7/19/2017

## 2021-06-11 NOTE — PROGRESS NOTES
[facility] Heart Care Note    Assessment:  Complete  heart block with profound bradycardia-symptomatic  Dual-chamber [employer] pacemaker implanted March 29, 2019   dyssynchrony from left bundle branch block or from pacemaker although ventricular pacing is at 33%  Pacemaker upgrade to CRT-P; August 12, 2019. 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-lots of collaterals around the left subclavian region  Leg ultrasound normal  warfarin stopped   No history of myocardial infarction; coronary CT angiogram May 7, 2019 showed moderate coronary artery stenosis with RCA 50-69%  Pulmonary function studies normal including normal diffusion capacity    Plan:  You have  been fatigued for uncertain reasons; perhaps from poor sleeping  The Pacmaker evaluation today is excellent  Battery good for another 10.5 years  Biventricular pacing at 100%; heart rate seems to have good variability  Only a brief episode arrhythmia was seen December 26 2019- none since  Blood work today will be a CBC  You did have recent comprehensive blood studies were normal  Echocardiogram August 24, 2020 showed improvement in ejection fraction now at 55%  You started atorvastatin for elevated cholesterol; LDL  Should have lipid checked in 3 months   Have device check through transtelephonic monitoring every 3 months  Return in 1 year for comprehensive cardiac arrhythmia device assessment            Subjective:    I had the opportunity to see.Helena KESHIA Meléndezcesar , who is a 80 y.o. female with a known history of heart block and biventricular pacemaker upgrade  Notes fatigue just does not have much energy  Does not have shortness of breath on exertion no fluid no awareness of arrhythmias or palpitations no angina  Echocardiogram August 25, 2020 showed ejection fraction of 55% and was better than before her resynchronization device; previous echocardiogram July 26, 2019 showed ejection fraction  44%  Cholesterol 247 with -was started on atorvastatin 20 mg/day about a week ago    July 23, 2020  Hemoglobin 12.9  Creatinine 0.82  Liver panel normal  ESR equals 12  Potassium equals 5.3          Problem List:  Patient Active Problem List   Diagnosis     Osteoporosis     Degenerative arthritis of left knee     Restless leg syndrome     Elevated blood pressure reading without diagnosis of hypertension     Hyperkalemia     Degenerative arthritis of right knee     Heart block     Complete AV block (H)     Exertional dyspnea     Cardiac pacemaker, CRT-P, in situ     Acute HFrEF (heart failure with reduced ejection fraction) (H)     Other pulmonary embolism without acute cor pulmonale (H)     Decreased cardiac ejection fraction     LBBB (left bundle branch block)     Shortness of breath     Pulmonary embolism (H)     Medical History:  Past Medical History:   Diagnosis Date     Anxiety      Arthritis      Asthma      Colitis     Collagenous     Complete heart block (H)      Hypertriglyceridemia      Macular degeneration      Osteopenia      Osteoporosis      Pacemaker      Pulmonary embolism (H)      Restless legs      Skin cancer, basal cell      Surgical History:  Past Surgical History:   Procedure Laterality Date     APPENDECTOMY       BREAST EXCISIONAL BIOPSY Right [id number]     BREAST SURGERY      biopsy     BUNIONECTOMY       EP PACEMAKER INSERT N/A 3/29/2019    Aaron Babcock MD;  Location: [facility];  Service: Cardiology     IR EXTREMITY VENOGRAM LEFT  7/25/2019     SC TOTAL KNEE ARTHROPLASTY Left 8/30/2018    Procedure:  LEFT MINIMALLY INVASIVE TOTAL KNEE ARTHROPLASTY;  Surgeon: Segun Banks MD;  Location: [facility];  Service: Orthopedics     SC TOTAL KNEE ARTHROPLASTY Right 11/8/2018    Procedure: RIGHT MINIMALLY INVASIVE TOTAL KNEE ARTHROPLASTY;  Surgeon: Segun Banks MD;  Location: [facility];  Service: Orthopedics     ROTATOR CUFF REPAIR       TONSILLECTOMY        Social History:  Social History     Socioeconomic History     Marital status:      Spouse name: Not on file     Number of children: Not on file     Years of education: Not on file     Highest education level: Not on file   Occupational History     Not on file   Social Needs     Financial resource strain: Not on file     Food insecurity     Worry: Not on file     Inability: Not on file     Transportation needs     Medical: Not on file     Non-medical: Not on file   Tobacco Use     Smoking status: Former Smoker     Packs/day: 0.50     Years: 2.00     Pack years: 1.00     Last attempt to quit: 1960     Years since quittin.8     Smokeless tobacco: Never Used   Substance and Sexual Activity     Alcohol use: Yes     Alcohol/week: 7.0 standard drinks     Types: 7 Glasses of wine per week     Comment: per week     Drug use: No     Sexual activity: Not on file   Lifestyle     Physical activity     Days per week: Not on file     Minutes per session: Not on file     Stress: Not on file   Relationships     Social connections     Talks on phone: Not on file     Gets together: Not on file     Attends Buddhist service: Not on file     Active member of club or organization: Not on file     Attends meetings of clubs or organizations: Not on file     Relationship status: Not on file     Intimate partner violence     Fear of current or ex partner: Not on file     Emotionally abused: Not on file     Physically abused: Not on file     Forced sexual activity: Not on file   Other Topics Concern     Not on file   Social History Narrative     Not on file       Review of Systems:      General: WNL  Eyes: WNL  Ears/Nose/Throat: WNL  Lungs: WNL  Heart: WNL  Stomach: WNL  Bladder: Frequent Urination at Night  Muscle/Joints: Joint Pain  Skin: WNL  Nervous System: Loss of Balance  Mental Health: WNL     Blood: WNL        Family History:  Family History   Problem Relation Age of Onset     Breast cancer Cousin      Heart  disease Mother      Alzheimer's disease Father      Clotting disorder Neg Hx          Allergies:  Allergies   Allergen Reactions     Nsaids (Non-Steroidal Anti-Inflammatory Drug) Other (See Comments)     Patient has history of microcolitis.  GI aggravation     Sulfa (Sulfonamide Antibiotics)      Eye drops caused burning       Medications:  Current Outpatient Medications   Medication Sig Dispense Refill     acetaminophen (TYLENOL) 500 MG tablet Take 1,000 mg by mouth as needed for pain.        amoxicillin (AMOXIL) 500 MG capsule TAKE 4 CAPSULES BY MOUTH 1 HOUR BEFORE DENTAL VISIT       aspirin (ASPIR-81) 81 MG EC tablet Take 81 mg by mouth daily.       atorvastatin (LIPITOR) 20 MG tablet Take 1 tablet (20 mg total) by mouth at bedtime. 90 tablet 5     bismuth subsalicylate (BISMUTH SUBSALICYLATE) 262 mg Chew chew tab Chew 3 tablets 3 (three) times a day.        CALCIUM CITRATE ORAL Take 600 mg by mouth 2 (two) times a day.        cholecalciferol, vitamin D3, (VITAMIN D3) 1,000 unit capsule Take 1,000 Units by mouth daily.       cyanocobalamin 1000 MCG tablet Take 500 mcg by mouth 2 (two) times a week.        gabapentin (NEURONTIN) 300 MG capsule Take 300-600 mg by mouth at bedtime.              KRILL OIL ORAL Take 1 capsule by mouth every other day. DHA 200mg every other day             magnesium oxide 250 mg Tab Take 250 mg by mouth at bedtime.        No current facility-administered medications for this visit.          Surgical site  The pacemaker  is well-healed to left subclavicular site    Device interrogation    Rhythm is sinus bradycardia at 50 with AV conduction  26% atrial paced  100% biventricular pacing  1 episode of nonsustained ventricular tachycardia occurred December 26, 2019-10 beats otherwise no arrhythmias no atrial fibrillation  Histogram pattern seems satisfactory    Objective:   Vital signs:  /60 (Patient Site: Left Arm, Patient Position: Sitting, Cuff Size: Adult Regular)   Pulse 68    "Resp 16   Ht 5' 2\" (1.575 m)   Wt 111 lb (50.3 kg)   BMI 20.30 kg/m        Physical Exam:      GENERAL APPEARANCE: Alert, cooperative and in no acute distress.  HEENT: No scleral icterus. No Xanthelasma. Oral mucous membranes pink and moist.  NECK: JVP normal cm. No Hepatojugular reflux. Thyroid not  Palpable  CHEST: clear to auscultation and percussion  CARDIOVASCULAR: S1, S2 without murmur    Brachial, radial  pulses are intact and symmetric.   No carotid bruits noted.  ABDOMEN: Non tender. BS+. No bruits.  EXTREMITIES: Chronic venostasis changes both feet with 1+ bilateral pedal edema    Lab Results:  LIPIDS:  Lab Results   Component Value Date    CHOL 247 (H) 07/23/2020    CHOL 182 06/06/2019    CHOL 179 09/12/2018     Lab Results   Component Value Date    HDL 79 07/23/2020    HDL 53 06/06/2019    HDL 61 09/12/2018     Lab Results   Component Value Date    LDLCALC 149 (H) 07/23/2020    LDLCALC 113 06/06/2019    LDLCALC 105 09/12/2018     Lab Results   Component Value Date    TRIG 97 07/23/2020    TRIG 82 06/06/2019    TRIG 67 09/12/2018     No components found for: CHOLHDL    BMP:  Lab Results   Component Value Date    CREATININE 0.82 07/23/2020    BUN 19 07/23/2020     07/23/2020    K 5.3 (H) 07/23/2020     07/23/2020    CO2 27 07/23/2020         This note has been dictated using voice recognition software. Any grammatical or context distortions are unintentional and inherent to the software.  Aaron Babcock MD  [facility]  [phone]            "

## 2021-06-11 NOTE — PATIENT INSTRUCTIONS - HE
Helena Brown    Thank you for coming to the [facility] today for a cardiac evaluation  It was my pleasure to see you today  A good contact for any questions would be Yvette Deleon  RN @ [phone]        You have  been fatigued for uncertain reasons; perhaps from poor sleeping  The Pacmaker evaluation today is excellent  Battery good for another 10.5 years  Biventricular pacing at 100%; heart rate seems to have good variability  Only a brief episode arrhythmia was seen December 26 2019- none since  Blood work today will be a CBC  You did have recent comprehensive blood studies were normal  Echocardiogram August 24, 2020 showed improvement in ejection fraction now at 55%  You started atorvastatin for elevated cholesterol; LDL  Should have lipid checked in 3 months   Have device check through transtelephonic monitoring every 3 months  Return in 1 year for comprehensive cardiac arrhythmia device assessment

## 2021-06-11 NOTE — PROGRESS NOTES
[facility]           Physical  Therapy Shoulder Initial Evaluation          [facility] Certification Request    July 10, 2017      Patient: Helena Brown  MR Number: [id number]  YOB: 1940  Date of Visit: 7/10/2017      Dear Dr. Alisson Rodriguez    Thank you for this referral.   We are seeing Helena Brown for Physical Therapy of R Shoulder.    Medicare and/or Medicaid requires physician review and approval of the treatment plan. Please review the plan of care and verify that you agree with the therapy plan of care by co-signing this note.      Plan of Care  Authorization / Certification Start Date: 07/10/17  Authorization / Certification End Date: 10/08/17  Authorization / Certification Number of Visits: 12  Communication with: Referral Source  Patient Related Instruction: Nature of Condition;Body mechanics;Posture;Treatment plan and rationale;Precautions;Next steps;Self Care instruction;Expected outcome;Basis of treatment  Times per Week: 2  Number of Weeks: 6  Number of Visits: 12  Discharge Planning: Provide pt with home ex program  to improve / maintainn strength, ROM to the UE's and preventreoccurence of mild subluxations to the Shoulder Joints  Therapeutic Exercise: ROM;Strengthening  Neuromuscular Reeducation: kinesio tape  Manual Therapy: soft tissue mobilization;craniosacral therapy;myofascial release;visceral manipulation;muscle energy;strain counterstrain    Goals:  Pt. will demonstrate/verbalize independence in self-management of condition in : 6 weeks  Pt. will be independent with home exercise program in : 6 weeks  Patient will reach / maintain arm movement: forward;overhead;behind;for home chores;for dressing;with partial ROM;with less pain;in 6 weeks  Patient will perform repetitive movement in : hand;for other activity;with less pain;with less difficulty;in 6 weeks  for other activity: For typing activities  Patient will be able to: for  writing;gripping;holding;for lifting;for dressing;for grooming/hygiene;with less pain;with less difficulty;in 6 weeks      If you have any questions or concerns, please don't hesitate to call.    Sincerely,      Paulo Ovalles, PT        Physician recommendation:     ___ Follow therapist's recommendation        ___ Modify therapy      *Physician co-signature indicates they certify the need for these services furnished within this plan and while under their care.      Patient Name: Helena Brown  Date of evaluation: 7/10/2017  Referral Diagnosis: Rotator cuff tear arthropathy of right shoulder  Referring provider: Han Hernandez, DO  Visit Diagnosis:     ICD-10-CM    1. Pain in joint of right shoulder M25.511    2. Osteoporosis M81.0    3. Rotator cuff tear arthropathy of right shoulder M12.811        Assessment:      Pt. is appropriate for skilled PT intervention as outlined in the Plan of Care (POC).  Pt. is a good candidate for skilled PT services to improve pain levels and function.    Goals:  No Data Recorded    Patient's expectations/goals are realistic.    Barriers to Learning or Achieving Goals:  Chronicity of the problem.       Plan / Patient Instructions:        Plan of Care:   No Data Recorded    POC and pathology of condition were reviewed with patient.  Pt. is in agreement with the Plan of Care  A Home Exercise Program (HEP) was initiated today.  Plan for next visit: Initiate  pt PER poc  .   Subjective:       Social information:   Living Situation:single family home, lives with others  and stairs  with railing   Occupation:retired   Work Status:NA   Equipment Available: None    History of Present Illness:    Helena is a 77 y.o. female who presents to therapy today with complaints of R Shld pain. Date of onset/duration of symptoms is March 2017.  Had Cortisone injection 2 weeks ago which has helped.  . Onset was sudden and not related to trama.  Fell a week ago but did not aggrevate the shld.  Symptoms are intermittent and getting better. She reports  an episodic  history of similar symptoms. She describes their previous level of function as not limited    Pain Ratin  Pain rating at best: 0  Pain rating at worst: 1  Pain description: dull    Functional limitations are described as occurring with:   personal cares dressing lower body and donning bra    Patient reports benefit from:  rest  , pain medication       Objective:      Note: Items left blank indicates the item was not performed or not indicated at the time of the evaluation.    Patient Outcome Measures :    No Data Recorded  Scores range from 0-100%, where a score of 0% represents minimal pain and maximal function. The minimal clincically important difference is a score reduction of 10%.    Shoulder Examination  1. Pain in joint of right shoulder     2. Osteoporosis     3. Rotator cuff tear arthropathy of right shoulder       Involved side: Right  Posture Observation:      General sitting posture is  normal.  General standing posture is fair.  Cervical:  Mild forward head  Shoulder/Thoracic complex: Mildly decreased mid thoracic kyphosis  Cervical Clearing: Normal    Shoulder/Elbow ROM   Date:      Shoulder and Elbow ROM ( )   AROM in degrees AROM in degrees AROM in degrees    Right Left Right Left Right Left   Shoulder Flexion (0-180 ) 131 158       Shoulder Abduction (0-180 ) 110 173       Shoulder Extension (0-60 )         Shoulder ER (0-90 ) 71 WNL's       Shoulder IR (0-70 ) 46 WNL's       Shoulder IR/Ext         Elbow Flexion (150 )         Elbow Extension (0 )          PROM in degrees PROM in degrees PROM in degrees    Right Left Right Left Right Left   Shoulder Flexion (0-180 )         Shoulder Abduction (0-180 )         Shoulder Extension (0-60 )         Shoulder ER (0-90 )         Shoulder IR (0-70 )         Elbow Flexion (150 )         Elbow Extension (0 )           Shoulder/Elbow Strength   Date:      Shoulder/Elbow Strength  (/5)  Manual Muscle Test (MMT) MMT MMT MMT    Right Left Right Left Right Left   Shoulder Flexion G G       Supraspinatus Pain G-       Shoulder Abduction         Shoulder Extension N N       Shoulder External Rotation Pain G-       Shoulder Internal Rotation         Elbow Flexion         Elbow Extension         Other:         Other:               Palpation: R Teres Minor,  R upper Quadrant Vascular and neuurological tenderpoints  Joint Assessment:  Sternoclavicular Joint Assessment: WNL  Acromioclavicular Joint Assessment: WNL  Scapulothoracic Joint Assessment: WNL.  Glenohumeral Joint Assessment:Old Rotator cufff repair 12 years R.   with Orthopedic DrsGamal Johnston  on X Ray of a Partially mary R Rotator cuff tear    Shoulder Special Tests     Impingement Cluster Right (+/-) Left (+/-) Rotator Cuff Tests Right (+/-) Left (+/-)   Barnett-Sandeep + - Drop Arm Sign     Painful Arc   Hornblowers     Infraspinatus Test   ERLS     AC Tests Right (+/-) Left (+/-) IRLS     Active Compression   Labral Tests Right (+/-) Left (+/-)   Crossbody Adduction   Biceps Load Test II     AC Resisted Extension   Jerk Test     GH Instability Tests Right (+/-) Left (+/-) Martita Test     Sulcus Sign   Biceps Tests Right (+/-) Left (+/-)   Apprehension   Speed     Relocation   Dar RIZVI +  Other:     Other:   Other:         Treatment Today   TREATMENT  COMMENTS   Evaluation 30    Self-care/ Home management     Manual therapy 15 MFR with OA, L5-SI and SI joint releases, Dural Tube Pull.      Neuromuscular Re-education     Therapeutic Activity     Therapeutic Exercises 15 Theraband Scapular depression and retraction 2 sets of 10 reps 1x/day    Pt Ind with exercise.   Pt's Obers test is now negative.   Pt's forward shld posture contributing to the Pain discomfort she is experencing.   Improvement  In R shld IR noted with resolution of her Obers sign due to improved approximation of the R humerus   Gait training      Modality__________________                Total 60    Blank areas are intentional and mean the treatment did not include these items.       PT Evaluation Code: (Please list factors)  Patient History/Comorbidities: 0  Examination: 2  Clinical Presentation:  evolving  Clinical Decision Making: mod    Patient History/  Comorbidities Examination  (body structures and functions, activity limitations, and/or participation restrictions) Clinical Presentation Clinical Decision Making (Complexity)   No documented Comorbidities or personal factors 1-2 Elements Stable and/or uncomplicated Low   1-2 documented comorbidities or personal factor 3 Elements Evolving clinical presentation with changing characteristics Moderate   3-4 documented comorbidities or personal factors 4 or more Unstable and unpredictable High            Paulo Ovalles  7/10/2017  9:12 AM

## 2021-06-11 NOTE — PROGRESS NOTES
[facility] Daily Progress     Patient Name: Helena Brown  Date: 2017  Visit #:    Referral Diagnosis: R Shld pain  Referring provider: Han Hernandez DO  Visit Diagnosis:     ICD-10-CM    1. Pain in joint of right shoulder M25.511    2. Rotator cuff tear arthropathy of right shoulder M12.811    3. Osteoporosis M81.0          Assessment:     Patient demonstrates understanding/independence with home program.  Patient is benefitting from skilled physical therapy and is making steady progress toward functional goals.  Patient is appropriate to continue with skilled physical therapy intervention, as indicated by initial plan of care.    Goal Status:  Pt. will demonstrate/verbalize independence in self-management of condition in : 6 weeks  Pt. will be independent with home exercise program in : 6 weeks  Patient will reach / maintain arm movement: forward;overhead;behind;for home chores;for dressing;with partial ROM;with less pain;in 6 weeks  Patient will perform repetitive movement in : hand;for other activity;with less pain;with less difficulty;in 6 weeks  for other activity: For typing activities  Patient will be able to: for writing;gripping;holding;for lifting;for dressing;for grooming/hygiene;with less pain;with less difficulty;in 6 weeks    Plan / Patient Education:     Continue with initial plan of care.    Subjective:     Pain Ratin-1/10  Over did it with the exercise and I feel alittle bit of pain to the R shld when I move it      Objective:     +Scan to Lymp of the TR Shld    Treatment Today     TREATMENT MINUTES COMMENTS   Evaluation     Self-care/ Home management     Manual therapy 20 SCS to SBP-LV,  SBA-LV R,   SUBS-LV R,  Basilic vein R,   Test strip taping of R middle deltiod   Neuromuscular Re-education     Therapeutic Activity     Therapeutic Exercises 25 AAROM with  ball on the wall for shld Flex, Abd, Rotation 3 reps progress to 5/10 reps each 1x/day    Releases  achieved to bursas and vein   Pt Ind with exercises   Gait training     Modality__________________                Total 45    Blank areas are intentional and mean the treatment did not include these items.       Paulo Ovalles  7/12/2017

## 2021-06-12 NOTE — PROGRESS NOTES
[facility] Daily Progress     Patient Name: Helena Brown  Date: 2017  Visit #:     Referral Diagnosis: R shld pain  Referring provider: Han Hernandez DO  Visit Diagnosis:     ICD-10-CM    1. Pain in joint of right shoulder M25.511    2. Rotator cuff tear arthropathy of right shoulder M12.811    3. Osteoporosis M81.0          Assessment:     Patient demonstrates understanding/independence with home program.  Patient is benefitting from skilled physical therapy and is making steady progress toward functional goals.  Patient is appropriate to continue with skilled physical therapy intervention, as indicated by initial plan of care.    Goal Status:  Pt. will demonstrate/verbalize independence in self-management of condition in : 6 weeks  Pt. will be independent with home exercise program in : 6 weeks  Patient will reach / maintain arm movement: forward;overhead;behind;for home chores;for dressing;with partial ROM;with less pain;in 6 weeks  Patient will perform repetitive movement in : hand;for other activity;with less pain;with less difficulty;in 6 weeks  for other activity: For typing activities  Patient will be able to: for writing;gripping;holding;for lifting;for dressing;for grooming/hygiene;with less pain;with less difficulty;in 6 weeks    Plan / Patient Education:     Continue with initial plan of care.    Subjective:     Pain Ratin  Pain with reaching into the R rear pocket,  Opening windows.   The shld was OK for 3 days after the last treatment.   Then pain returned      Objective:     Tender points to the R shld, scapula    Treatment Today     TREATMENT MINUTES COMMENTS   Evaluation     Self-care/ Home management     Manual therapy 30 SCS to R Sub Acromial bursa with instruction to pt in home release.  Krys Taping  Of R A-C Joint and bursa  SCS to THDOR-A R,  ACH-A R,      Releases achieved  Pain 0/10 when provoked..  Pt to have daughter come in to learn Taping    Neuromuscular Re-education     Therapeutic Activity     Therapeutic Exercises     Gait training     Modality__________________                Total 30    Blank areas are intentional and mean the treatment did not include these items.       Paulo Ovalles  8/23/2017

## 2021-06-12 NOTE — PROGRESS NOTES
[facility] Daily Progress     Patient Name: Helena Brown  Date: 2017  Visit #:    Referral Diagnosis: R shld arthropathy, Pain  Referring provider: Han Hernandez DO  Visit Diagnosis:     ICD-10-CM    1. Pain in joint of right shoulder M25.511    2. Rotator cuff tear arthropathy of right shoulder M12.811    3. Osteoporosis M81.0          Assessment:     Patient demonstrates understanding/independence with home program.  Patient is benefitting from skilled physical therapy and is making steady progress toward functional goals.  Patient is appropriate to continue with skilled physical therapy intervention, as indicated by initial plan of care.    Goal Status:  Pt. will demonstrate/verbalize independence in self-management of condition in : 6 weeks  Pt. will be independent with home exercise program in : 6 weeks  Patient will reach / maintain arm movement: forward;overhead;behind;for home chores;for dressing;with partial ROM;with less pain;in 6 weeks  Patient will perform repetitive movement in : hand;for other activity;with less pain;with less difficulty;in 6 weeks  for other activity: For typing activities  Patient will be able to: for writing;gripping;holding;for lifting;for dressing;for grooming/hygiene;with less pain;with less difficulty;in 6 weeks    Plan / Patient Education:     Continue with initial plan of care.    Subjective:     Pain Ratin/10  Pain with Int rot and ext of the r shld      Objective:     Tender points to the R UE Bursas.   Pt only able to extend the R shkld but no int rot available     Treatment Today     TREATMENT MINUTES COMMENTS   Evaluation     Self-care/ Home management     Manual therapy 30 SCS to Subacromial bursa Ant, Post R,  Sub scapularis bursa,  RTeres Major    Pts Internal rotation of the R Shld improved to where she can now touch her lumbar spine with her R thimb.  Releases successful   Neuromuscular Re-education     Therapeutic Activity      Therapeutic Exercises     Gait training     Modality__________________                Total 30    Blank areas are intentional and mean the treatment did not include these items.       Paulo Ovalles  8/9/2017

## 2021-06-12 NOTE — PROGRESS NOTES
[facility] Daily Progress     Patient Name: Helena Brown  Date: 2017  Visit #:     Referral Diagnosis:R Shld Pain  Referring provider: Han Hernandez DO  Visit Diagnosis:     ICD-10-CM    1. Pain in joint of right shoulder M25.511    2. Rotator cuff tear arthropathy of right shoulder M12.811    3. Osteoporosis M81.0          Assessment:     Patient demonstrates understanding/independence with home program.  Patient is benefitting from skilled physical therapy and is making steady progress toward functional goals.  Patient is appropriate to continue with skilled physical therapy intervention, as indicated by initial plan of care.    Goal Status:  Pt. will demonstrate/verbalize independence in self-management of condition in : 6 weeks  Pt. will be independent with home exercise program in : 6 weeks  Patient will reach / maintain arm movement: forward;overhead;behind;for home chores;for dressing;with partial ROM;with less pain;in 6 weeks  Patient will perform repetitive movement in : hand;for other activity;with less pain;with less difficulty;in 6 weeks  for other activity: For typing activities  Patient will be able to: for writing;gripping;holding;for lifting;for dressing;for grooming/hygiene;with less pain;with less difficulty;in 6 weeks    Plan / Patient Education:     Continue with initial plan of care.    Subjective:     Pain Ratin-2/10  reaching into the back pocket is the worse      Objective:   Int Rot of the R shld is painful as is sleeping on the R side    Treatment Today     TREATMENT MINUTES COMMENTS   Evaluation     Self-care/ Home management     Manual therapy 30 SCS to LF T1, 2, 3, 4-MS R.   SCS to Subscapularis Bursa R        Tender points resolved   Pt able to internally rot the R Shld to the T 11 spinal level  Whereas she could not get the thumb past the R Illium  Check Nerves of the R shld next session   Neuromuscular Re-education     Therapeutic Activity      Therapeutic Exercises     Gait training     Modality__________________                Total 30    Blank areas are intentional and mean the treatment did not include these items.       Paulo Ovalles  8/16/2017

## 2021-06-12 NOTE — PROGRESS NOTES
[facility] Daily Progress     Patient Name: Helena Brown  Date: 2017  Visit #:     Referral Diagnosis: R Shld pain  Referring provider: Han Hernandez DO  Visit Diagnosis:     ICD-10-CM    1. Pain in joint of right shoulder M25.511    2. Rotator cuff tear arthropathy of right shoulder M12.811    3. Osteoporosis M81.0          Assessment:     Patient demonstrates understanding/independence with home program.  Patient is benefitting from skilled physical therapy and is making steady progress toward functional goals.  Patient is appropriate to continue with skilled physical therapy intervention, as indicated by initial plan of care.    Goal Status:  Pt. will demonstrate/verbalize independence in self-management of condition in : 6 weeks  Pt. will be independent with home exercise program in : 6 weeks  Patient will reach / maintain arm movement: forward;overhead;behind;for home chores;for dressing;with partial ROM;with less pain;in 6 weeks  Patient will perform repetitive movement in : hand;for other activity;with less pain;with less difficulty;in 6 weeks  for other activity: For typing activities  Patient will be able to: for writing;gripping;holding;for lifting;for dressing;for grooming/hygiene;with less pain;with less difficulty;in 6 weeks    Plan / Patient Education:     Continue with initial plan of care.    Subjective:     Pain Ratin-2/10  Pain with activity      Objective:     + Scan Arterial    Treatment Today     TREATMENT MINUTES COMMENTS   Evaluation     Self-care/ Home management     Manual therapy 30 SCS to BR-A R,   POST Axillary Nodes R,   DBR-A R,   THAC-A R,  THDOR-A R,   CSCS-A R,       Tender points resolved   Pt exhibits improved ROM to the R Shld with ball exercises.   Measure next session   Neuromuscular Re-education     Therapeutic Activity     Therapeutic Exercises     Gait training     Modality__________________                Total 30    Blank areas are  intentional and mean the treatment did not include these items.       Paulo Ovalles  7/26/2017

## 2021-06-12 NOTE — PROGRESS NOTES
[facility] Daily Progress      [facility] Discharge Summary    Patient Name: Helena Brown  Date: 10/16/2017  Referral Diagnosis: [unfilled]  Referring provider: Han Hernandez DO  Visit Diagnosis:   1. Pain in joint of right shoulder     2. Rotator cuff tear arthropathy of right shoulder     3. Osteoporosis         Goals:  Pt. will demonstrate/verbalize independence in self-management of condition in : Met  Pt. will be independent with home exercise program in : Met  Patient will reach / maintain arm movement: Met  Patient will perform repetitive movement in : Met  for other activity: Met  No Data Recorded    Patient was seen for 9 visits from 7/10/17 to 9/11/17 with 0 missed appointments.  The patient met goals and has demonstrated understanding of and independence in the home program for self-care, and progression to next steps.  She will initiate contact if questions or concerns arise.  The patient was instructed to follow up with physician's clinic.  The patient reports feeling better and did not wish to schedule further therapy at this time.  Patient received a home program for Scapular and shoulder strengthening.     Pt saw her orthopedic Dr and he injected the shoulder and the pain has no returned.   He recommends surgery for the problem but the pain has not returned and Helena will  Hold on that decision    Therapy will be discontinued at this time.  The patient will need a new referral to resume.    Thank you for your referral.  Paulo Ovalles  10/16/2017  7:44 AM  Patient Name: Helena Brown  Date: 10/16/2017  Visit #: 8/12    Referral Diagnosis: R Shld pain  Referring provider: Han Hernandez,   Visit Diagnosis:     ICD-10-CM    1. Pain in joint of right shoulder M25.511    2. Rotator cuff tear arthropathy of right shoulder M12.811    3. Osteoporosis M81.0          Assessment:     Patient demonstrates understanding/independence with home program.  Patient  is benefitting from skilled physical therapy and is making steady progress toward functional goals.  Patient is appropriate to continue with skilled physical therapy intervention, as indicated by initial plan of care.    Goal Status:  Pt. will demonstrate/verbalize independence in self-management of condition in : Met  Pt. will be independent with home exercise program in : Met  Patient will reach / maintain arm movement: Met  Patient will perform repetitive movement in : Met  for other activity: Met  No Data Recorded    Plan / Patient Education:     Continue with initial plan of care.    Subjective:     Pain Ratin-3/10      Pain at ends of ROM      Objective:     Tight internal rotation     Treatment Today    TREATMENT MINUTES COMMENTS   Evaluation     Self-care/ Home management     Manual therapy 30 L AIC, B BC, PEC releases. SCS to Ant, Post Sub acromial bursa R.  ROM   Measurements R Shoulder  9/11/17                        7/10/17     Flex 0-153                 0-131  Abd  o-135                 0-110  Int Rot  0-39               0-46  Ext Rot  0-81             0-71  Following therapy Int Rot ROM improved slightly 0-43 degrees    Will  Hold PT and have Pt see her orthopedic  re: further evaluation of this problem   Pt able to receive benefits of pain reduction for 1-2 days with return of symptoms      Neuromuscular Re-education     Therapeutic Activity     Therapeutic Exercises     Gait training     Modality__________________                Total 30    Blank areas are intentional and mean the treatment did not include these items.       Paulo Ovalles  10/16/2017

## 2021-06-12 NOTE — PROGRESS NOTES
"Helena Brown is a 80 y.o. female who is being evaluated via a billable telephone visit.      The patient has been notified of following:     \"This telephone visit will be conducted via a call between you and your physician/provider. We have found that certain health care needs can be provided without the need for a physical exam.  This service lets us provide the care you need with a short phone conversation.  If a prescription is necessary we can send it directly to your pharmacy.  If lab work is needed we can place an order for that and you can then stop by our lab to have the test done at a later time.    Telephone visits are billed at different rates depending on your insurance coverage. During this emergency period, for some insurers they may be billed the same as an in-person visit.  Please reach out to your insurance provider with any questions.    If during the course of the call the physician/provider feels a telephone visit is not appropriate, you will not be charged for this service.\"    Patient has given verbal consent to a Telephone visit? Yes    What phone number would you like to be contacted at? [phone]    Patient would like to receive their AVS by AVS Preference: Maximiliano.      ASSESSMENT AND PLAN:    Diagnoses and all orders for this visit:    Primary osteoarthritis involving multiple joints          HISTORY OF PRESENTING ILLNESS:  Helena Brown 80 y.o. is evaluated here via phone link for follow-up of recent visit for polyarthralgias.  There is been ample evidence that she has osteoarthritis, little to suggest rheumatoid arthritis.  This is outlined to her.  She was relieved on one hand but also somewhat disappointed that there was no \"curative\" treatment.  She is aware basic principles of management of the symptoms.  She is aware of the options ranging from acetaminophen through to duloxetine corticosteroid injections.  In selected patients surgical.  She will follow-up on as-needed " basis.    Following is the excerpt from a recent note:   for evaluation of painful joints predominantly in her hands.  She reports that she has longstanding changes in her hand joints spanning several years where she would have intermittent discomfort, deformity, somewhat similar to how her mom's hands evolved over time.  Nonetheless she was able to do most of her day-to-day activities.  Approximately 2-1/2 months ago she experienced worsening pain.  This interfered with her ability to move her digits, make a fist, which is not easy anyway to begin with given the deformities but has become significantly harder.  She was unable for example to oppose she demonstrated the left middle finger to the thumb.  This was throughout the day.  She has not been able to take nonsteroidals because of collagenous colitis.  She takes Tylenol which provides only marginal benefit if at all.  Over the past couple of weeks the things have improved.  She has noted no other joint areas are similarly affected.  She has had bilateral knee replacement.  She has had a rotator cuff tear on the right rotator cuff where she has had surgery once.  She describes herself otherwise in good health she does have a pacemaker in place.  He describes no personal or family history of psoriasis ulcerative colitis Crohn's disease.  There is no family history of rheumatoid arthritis, lupus.  She retired from teaching music.    Further historical information and ADL limitations as noted in the multidimensional health assessment questionnaire attached in the EMR. Rest of the 13 system ROS is negative.         . ROS enquiry held for fever, ocular symptoms, rash, headache,  GI issues.  Today we also discussed the issues related to the current pandemic, the pros and cons of the current treatment plan, the CDC guidelines such as social distancing washing the hands covering the cough.  ALLERGIES:Nsaids (non-steroidal anti-inflammatory drug) and Sulfa (sulfonamide  antibiotics)    PAST MEDICAL/ACTIVE PROBLEMS/MEDICATION/SOCIAL DATA  Past Medical History:   Diagnosis Date     Anxiety      Arthritis      Asthma      Colitis     Collagenous     Complete heart block (H)      Hypertriglyceridemia      Macular degeneration      Osteopenia      Osteoporosis      Pacemaker      Pulmonary embolism (H)      Restless legs      Skin cancer, basal cell      Social History     Tobacco Use   Smoking Status Former Smoker     Packs/day: 0.50     Years: 2.00     Pack years: 1.00     Quit date: 1960     Years since quittin.9   Smokeless Tobacco Never Used     Patient Active Problem List   Diagnosis     Osteoporosis     Degenerative arthritis of left knee     Restless leg syndrome     Elevated blood pressure reading without diagnosis of hypertension     Hyperkalemia     Degenerative arthritis of right knee     Heart block     Complete AV block (H)     Exertional dyspnea     Cardiac pacemaker, CRT-P, in situ     Acute HFrEF (heart failure with reduced ejection fraction) (H)     Other pulmonary embolism without acute cor pulmonale (H)     Decreased cardiac ejection fraction     LBBB (left bundle branch block)     Shortness of breath     Pulmonary embolism (H)     Current Outpatient Medications   Medication Sig Dispense Refill     acetaminophen (TYLENOL) 500 MG tablet Take 1,000 mg by mouth as needed for pain.        amoxicillin (AMOXIL) 500 MG capsule TAKE 4 CAPSULES BY MOUTH 1 HOUR BEFORE DENTAL VISIT       aspirin (ASPIR-81) 81 MG EC tablet Take 81 mg by mouth daily.       atorvastatin (LIPITOR) 20 MG tablet Take 1 tablet (20 mg total) by mouth at bedtime. 90 tablet 5     bismuth subsalicylate (BISMUTH SUBSALICYLATE) 262 mg Chew chew tab Chew 3 tablets 3 (three) times a day.        CALCIUM CITRATE ORAL Take 600 mg by mouth 2 (two) times a day.        cholecalciferol, vitamin D3, (VITAMIN D3) 1,000 unit capsule Take 1,000 Units by mouth daily.       cyanocobalamin 1000 MCG tablet Take  500 mcg by mouth 2 (two) times a week.        gabapentin (NEURONTIN) 300 MG capsule Take 300-600 mg by mouth at bedtime.              KRILL OIL ORAL Take 1 capsule by mouth every other day. DHA 200mg every other day             magnesium oxide 250 mg Tab Take 250 mg by mouth at bedtime.        No current facility-administered medications for this visit.          EXAMINATION: She sounded comfortable alert oriented speech was fluent with no shortness of breath, or sounded like she was in pain.      LAB / IMAGING DATA:  ALT   Date Value Ref Range Status   07/23/2020 18 0 - 45 U/L Final   04/23/2019 12 0 - 45 U/L Final   03/28/2019 25 0 - 45 U/L Final     Albumin   Date Value Ref Range Status   07/23/2020 3.8 3.5 - 5.0 g/dL Final   06/06/2019 3.3 (L) 3.5 - 5.0 g/dL Final   04/23/2019 3.7 3.5 - 5.0 g/dL Final     Creatinine   Date Value Ref Range Status   07/23/2020 0.82 0.60 - 1.10 mg/dL Final   08/12/2019 0.84 0.60 - 1.10 mg/dL Final   06/06/2019 0.74 0.60 - 1.10 mg/dL Final       WBC   Date Value Ref Range Status   08/28/2020 5.5 4.0 - 11.0 thou/uL Final   08/12/2019 5.3 4.0 - 11.0 thou/uL Final     Hemoglobin   Date Value Ref Range Status   08/28/2020 12.1 12.0 - 16.0 g/dL Final   08/12/2019 12.2 12.0 - 16.0 g/dL Final   07/09/2019 11.0 (L) 12.0 - 16.0 g/dL Final     Platelets   Date Value Ref Range Status   08/28/2020 174 140 - 440 thou/uL Final   08/12/2019 210 140 - 440 thou/uL Final   06/04/2019 329 140 - 440 thou/uL Final       Lab Results   Component Value Date    RF <15.0 09/29/2020    SEDRATE 12 07/23/2020     Duration of the call:6  Minutes  Call start: 1016 a  Call end:   1022 am

## 2021-06-12 NOTE — PROGRESS NOTES
[facility] Daily Progress     Patient Name: Helena Brown  Date: 2017  Visit #:5/10    Referral Diagnosis: R Shld pain  Referring provider: Han Hernandez DO  Visit Diagnosis:     ICD-10-CM    1. Pain in joint of right shoulder M25.511    2. Rotator cuff tear arthropathy of right shoulder M12.811    3. Osteoporosis M81.0          Assessment:     Patient demonstrates understanding/independence with home program.  Patient is benefitting from skilled physical therapy and is making steady progress toward functional goals.  Patient is appropriate to continue with skilled physical therapy intervention, as indicated by initial plan of care.    Goal Status:  Pt. will demonstrate/verbalize independence in self-management of condition in : 6 weeks  Pt. will be independent with home exercise program in : 6 weeks  Patient will reach / maintain arm movement: forward;overhead;behind;for home chores;for dressing;with partial ROM;with less pain;in 6 weeks  Patient will perform repetitive movement in : hand;for other activity;with less pain;with less difficulty;in 6 weeks  for other activity: For typing activities  Patient will be able to: for writing;gripping;holding;for lifting;for dressing;for grooming/hygiene;with less pain;with less difficulty;in 6 weeks    Plan / Patient Education:     Continue with initial plan of care.    Subjective:     Pain Ratin  Pain higher with movement.   Hard to get to sleep.   Sleep on my L side      Objective:     Tender points to the R Rotator cuff    Treatment Today     TREATMENT MINUTES COMMENTS   Evaluation     Self-care/ Home management     Manual therapy 20 SCS to Ant Sub acromial bursa,  ACH-A R,  ARLEEN-N,  LONG-N R,   DS-N,     Neuromuscular Re-education 10 KTing of the R Rotator cuff muscles and Middle deltoid    Pain levels decreasing following treatment.   Pt to resume exercises tomorrow.    cleaning of the gargage aggrevated the R shld   Therapeutic  Activity     Therapeutic Exercises     Gait training     Modality__________________                Total 30    Blank areas are intentional and mean the treatment did not include these items.       Paulo Ovalles  8/2/2017

## 2021-06-16 NOTE — TELEPHONE ENCOUNTER
"Telephone Encounter by Swathi Molina RN at 2/19/2020  9:56 AM     Author: Swathi Molina RN Service: -- Author Type: Registered Nurse    Filed: 2/19/2020 10:49 AM Encounter Date: 2/19/2020 Status: Addendum    : Swathi Molina RN (Registered Nurse)    Related Notes: Original Note by Swathi Molina RN (Registered Nurse) filed at 2/19/2020 10:04 AM       Type: routine remote CRT-P transmission.  Presenting rhythm: AP-biVP, and AS-biVP, rate 61 bpm.  Battery/lead status: stable, LV impedance is trending up.  Arrhythmias: since 11/20/19, no AT/AF detected. One ventricular high rate episode on 12/26/19, 10-bt NSVT rate 180bpm (last EF 44%).  Comments: normal magnet and pacemaker function. BiV pacing 100%. Routed to device RN. Prd     Transmission reviewed, agree with above. Upgrade to CRT-P device done in 8/2019 with Dr. Babcock. Last Echo at that time showed EF 44%, is due for OV and repeat Echo with Dr. Babcock in May 2020. Reviewed results with patient, she denies any unusual lightheadedness. States she does note every once in a while in bed she can feel her heart beating \"all the way across my back\". Always seems to happen when she is lying in bed and very early in the morning, lasts about a 1-2 minutes and then is gone. Does not appear the LV auto-cap testing is on, unsure what this is exactly that she is feeling, States it is not bothersome. Can do further testing at next clinic visit.     Will review with Dr. Babcock to see if any other recommendations before upcoming visit.   Swathi Molina RN        NSVT from 12/26/19 at ~10 AM:         "

## 2021-06-16 NOTE — TELEPHONE ENCOUNTER
Telephone Encounter by Swathi Molina RN at 8/30/2019 10:11 AM     Author: Swathi Molina RN Service: -- Author Type: Registered Nurse    Filed: 8/30/2019 10:18 AM Encounter Date: 8/28/2019 Status: Signed    : Swathi Molina RN (Registered Nurse)       Aaron Babcock MD Lowndes, Jenna M RN              Agree with LV lead changes    Previous Messages      ----- Message -----   From: Yesenia Jimenez RN   Sent: 8/28/2019   4:29 PM   To: Aaron Babcock MD   Subject: Please review 8/19 office PO appointment         Please review office visit from last week. LV reprogramming that I did. Patient calling to confirm that changes are ok.     Thanks!   Yesenia         Above noted, Call placed to patient, left detailed VM that Dr. Babcock agreed with the changes made to device settings. Left callback number if patient has any further questions.     Swathi Molina, RN

## 2021-06-16 NOTE — TELEPHONE ENCOUNTER
Telephone Encounter by Yesenia Jimenez, RN at 4/19/2019  4:00 PM     Author: Yesenia Jimenez, RN Service: -- Author Type: Registered Nurse    Filed: 4/19/2019  4:00 PM Encounter Date: 4/18/2019 Status: Signed    : Yesenia Jimenez, RN (Registered Nurse)       Diaz Silver MD Lowndes, Jenna M, RN   Cc: [facility] Ep Rn Support Pool   Caller: Unspecified (Yesterday,  2:46 PM)             CXR reviewed,  Normal lead position with out change from implantation.  Normal cardiac silhouette.   OK to take ibuprofen 200-400 mg q 6 hr with meals or snack.  Device check in 1 week.  dd      Patient is having a limited echo and seeing Dr. Killian on Tuesday in [facility].

## 2021-06-16 NOTE — TELEPHONE ENCOUNTER
Telephone Encounter by Yesenia Jimenez RN at 4/18/2019  2:52 PM     Author: Yesenia Jimenez RN Service: -- Author Type: Registered Nurse    Filed: 4/18/2019  3:46 PM Encounter Date: 4/18/2019 Status: Addendum    : Yesenia Jimenez RN (Registered Nurse)    Related Notes: Original Note by Yesenia Jimenez RN (Registered Nurse) filed at 4/18/2019  3:33 PM       Phone call to patient. She is a new pacemaker implant from 3/29/19. PO was on 4/15/19, only significant information was that the RA sensing was done from 2.4mV to 0.4-0.8mV and RV threshold up slightly from 0.5V @ 0.4ms to 1.0V @ 0.4ms. RV lead measurements were stable.     She calls today with chest pain in the center of her chest for about two weeks and dyspnea on exertion for 1 week. She said the chest pain is worst when she is laying flat and uncomfortable when she tries to take a deep breath. She is winded with stairs and walking. She used to always walk 2 miles a day (prior to recent knee surgery), but she still remained very active.     She notes her pulse rate is normal. She takes 81mg Aspirin. Last ECHO was 3/29 EF 55-60%. Remote shows WNL, however RA EGM size is variable throughout the presenting rhythm and RA sensitivity remains between 0.2mV-4mV per trend.     Spoke to Rina Castrejon CNP. She ordered a CXR and appointment with cardiologist in [facility] due to symptoms. Also, routing to Dr. Silver for review. First [facility] is Tuesday, 4/23/19. Advised she go to [facility] ED if symptoms worsen.    Discussed with patient. She is agreeable and will come for CXR today or tomorrow.       Device Report:  Type: Add-on remote pacemaker transmission per Yesenia for chest pain x2 weeks and dyspnea on exertion x1 week.   Presenting Rhythm: Sinus, rate 90s-100s.   Lead/Battery status: Stable.   Arrhythmias: Since 4/5/19, 36 mode switches detected, longest lasting 3 min and 23 seconds, burden <1%, review of available EGMs appear to be atrial tachy-arrhythmias. No  ventricular high rate episodes detected.   Comments: Normal magnet and pacemaker function. Routed to Yesenia IQBAL RN for review. FERNANDO

## 2021-06-17 NOTE — PATIENT INSTRUCTIONS - HE
Patient Instructions by Reese Tom DO at 10/1/2019  1:30 PM     Author: Reese Tom DO Service: -- Author Type: Physician    Filed: 10/1/2019  1:50 PM Encounter Date: 10/1/2019 Status: Addendum    : Reese Tom DO (Physician)    Related Notes: Original Note by Reese Tom DO (Physician) filed at 10/1/2019  1:42 PM         It was a pleasure to meet with you today in clinic.  Please do not hesitate to call the [facility] clinic with any questions or concerns at [phone].    Additional Provider Instructions:   1. Follow-up with device clinic in November.        Sincerely,

## 2021-06-17 NOTE — TELEPHONE ENCOUNTER
----- Message from Anitha Gerber V sent at 5/7/2021  8:48 AM CDT -----  General phone call:  dermatologist told her to wear to wear compression stalking all the time for the rest of her life.  She would like to talk to someone if she should really being be doing this.  Also wondering about her device if her third lead is working too hard.  And should she see the device nurse before yearly follow-up in august?      Caller: OMAR  Primary cardiologist: MARYAN  Detailed reason for call: SEE ABOVE  New or active symptoms? NO  Best phone number: [phone]  Best time to contact: ANYTIME  Ok to leave a detailed message? YES  Device? YES    Additional Info:

## 2021-06-17 NOTE — PROGRESS NOTES
called back, spoke to her regarding her concerns.  She stated that she saw her dermatologist for statis dermatitis, her dermatologist is recommending she wear compression stockings to help prevent ulcers.  Pt would like to run this by Dr. Babcock to see if he is ok with her wearing compression stockings for statis dermatitis.  I informed her that Dr. Babcock would liekly not overrule her dermatologist recommendations for statis dermatitis, pt still requested we send to Dr. Babcock.  Pt denies weight gain and pitting edema in lower extremities.     Pt also Is concerned that her 3rd lead is working too hard, went over last device check with pt and the results of a normally functioning PPM, also went over Echo results that showed her improved EF up to 55%.  At this point her  got on the phone and asked if we would be able to increase pts EF to above 60% to see if it help.  I asked why pt was needing help, she stated her stamina has walking distance has never fully improved since having the upgrade to her device.  She denies any changes in the last few months, & no increase in symptoms.    I advised pt to discuss this with Dr. Babcock at her yearly visit that will be coming up in August.     Pt wondering if she should be getting a yearly echo prior to her follow up with Dr. Babcock as well, I let her know this is not ordered but I will forward to Dr. Babcock to see if its needed.     Dr. Babcock- Please let us know if you have any further recommendations for the pt prior to her follow up with you.  She has her scheduled remote check on 6/3/21 with device RNs and then will see you in device clinic in August 21.     Thank you,     Kyra    Patient has cardiomyopathy  She does not have a normal heart and we cannot make her heart normal  But we can improve it substantially with device therapy and medications  Ejection fraction has improved with resynchronization from 35% to 55%  Compression hose are a good  idea-should continue   Blood work on April 12, 2021; normal electrolytes, creatinine 0.76  Should follow-up in her primary care doctor's clinic about her fatigue and generalized symptoms  Start spironolactone 25 mg daily  Follow-up in device clinic within 3 months

## 2021-06-17 NOTE — PATIENT INSTRUCTIONS - HE
Patient Instructions by Reese Tom DO at 5/7/2019 11:10 AM     Author: Reese Tom DO Service: -- Author Type: Physician    Filed: 5/7/2019 11:59 AM Encounter Date: 5/7/2019 Status: Addendum    : Reese Tom DO (Physician)    Related Notes: Original Note by Reese Tom DO (Physician) filed at 5/7/2019 11:59 AM       Below is a list of instructions we discussed today in clinic:   1. Change Furosemide 20 mg as needed if > 3 lb weight gain with symptoms of shortness of breath.  2. Start lisinopril 5 mg daily.   3. Coronary CT angiogram to rule out coronary blockage as cause of reduced ejection fraction.   4. Close outpatient follow-up.       It was a pleasure to meet with you today in clinic.  Please do not hesitate to call the [facility] Care clinic with any questions or concerns at [phone].      Sincerely,

## 2021-06-17 NOTE — PATIENT INSTRUCTIONS - HE
Patient Instructions by Reese Tom DO at 6/26/2019  3:30 PM     Author: Reese Tom DO Service: -- Author Type: Physician    Filed: 6/26/2019  4:38 PM Encounter Date: 6/26/2019 Status: Signed    : Reese Tom DO (Physician)       Below is a list of instructions we discussed today in clinic:   1. Ongoing improvement in heart function based on ejection fraction currently 49%.  Fluid around heart has improved (now trace).  Pacing has decrease to 38% from nearly 80%.   Concern would be extent of pulmonary infarction from pulmonary embolism and hypotension from beta blocker (metoprolol).    2. Hold metoprolol to determine if exertional fatigue improves.    3. Will discuss with Dr. Babcock regarding further management of device.   4. If on going exertional dyspnea will need to consider 6 minute walk testing (to check for low oxygenation) and VQ scan if walking test is abnormal to determine extent of pulmonary infarction.      It was a pleasure to meet with you today in clinic.  Please do not hesitate to call the [facility] Care clinic with any questions or concerns at [phone].    Sincerely,

## 2021-06-17 NOTE — TELEPHONE ENCOUNTER
Attempted to contact pt, voicemail message was left with contact information and instructing pt to call back.  5/7/2021 9:06 AM  Yvette Rg RN

## 2021-06-17 NOTE — TELEPHONE ENCOUNTER
Pt called back, spoke to her regarding her concerns.  She stated that she saw her dermatologist for statis dermatitis, her dermatologist is recommending she wear compression stockings to help prevent ulcers.  Pt would like to run this by Dr. Babcock to see if he is ok with her wearing compression stockings for statis dermatitis.  I informed her that Dr. Babcock would liekly not overrule her dermatologist recommendations for statis dermatitis, pt still requested we send to Dr. Babcock.  Pt denies weight gain and pitting edema in lower extremities.    Pt also Is concerned that her 3rd lead is working too hard, went over last device check with pt and the results of a normally functioning PPM, also went over Echo results that showed her improved EF up to 55%.  At this point her  got on the phone and asked if we would be able to increase pts EF to above 60% to see if it help.  I asked why pt was needing help, she stated her stamina has walking distance has never fully improved since having the upgrade to her device.  She denies any changes in the last few months, & no increase in symptoms.    I advised pt to discuss this with Dr. Babcock at her yearly visit that will be coming up in August.    Pt wondering if she should be getting a yearly echo prior to her follow up with Dr. Babcock as well, I let her know this is not ordered but I will forward to Dr. Babcock to see if its needed.    Dr. Babcock- Please let us know if you have any further recommendations for the pt prior to her follow up with you.  She has her scheduled remote check on 6/3/21 with device RNs and then will see you in device clinic in August 21.    Thank you,    Kyra

## 2021-06-17 NOTE — PROGRESS NOTES
Spoke to pt regarding recommendations per Dr. Babcock.  She agrees to plan and will start spironolactone 25mg daily.  I will send in to her pharmacy today for her to .  Pt agrees to follow up in August in device clinic.    She will also follow up with her dermatologist regarding issues with compression stockings, she states her arthritis makes it difficult to get on.     No further questions or concerns at this time.      Kyra

## 2021-06-19 NOTE — LETTER
Letter by Abbey Rodriguez EPS at      Author: Abbey Rodriguez EPS Service: -- Author Type: --    Filed:  Encounter Date: 9/13/2019 Status: (Other)         Helena Brown  [address] [facility] [address]      September 13, 2019      Dear Ms. Brown,    RE: Remote Results    We are writing to you regarding your recent Remote Pacemaker check from home. Your transmission was received successfully. Battery status is satisfactory at this time.     Your results are within normal limits.    Your next device appointment will be a clinic visit.  Please call to schedule.      To schedule or reschedule, please call [phone] and press 1.    NOTE: If you would like to do an extra transmission, please call [phone] and press 3 to speak to a nurse BEFORE transmitting. This ensures that the Device Clinic staff is aware of the reason you are sending a transmission, and can follow-up with you after it has been reviewed.    We will be checking your implanted device from home (remotely) every three months unless otherwise instructed. We will need to see you in the clinic at least once a year. You may need to be seen in the clinic sooner depending on the results of your check.    Please be aware:    The follow-up schedule is like a Physician prescription.    Your remote monitor is paired to your specific implanted device.      Sincerely,    [facility]

## 2021-06-19 NOTE — LETTER
Letter by Norbert Melvin MD at      Author: Norbert Melvin MD Service: -- Author Type: --    Filed:  Encounter Date: 10/24/2019 Status: Signed         Qasim Welsh MD  [address] [facility] [address]                                  October 24, 2019    Patient: Helena Brown   MR Number: [id number]   YOB: 1940   Date of Visit: 10/24/2019     Dear Dr. Blaise MD:    Thank you for referring Helena Brown to me for evaluation. Below are the relevant portions of my assessment and plan of care.    If you have questions, please do not hesitate to call me. I look forward to following Helena along with you.    Sincerely,        Norbert Melvin MD          CC  No Recipients  Norbert Melvin MD  10/24/2019  3:21 PM  Sign when Signing Visit  Pulmonary Clinic Outpatient Consultation    Assessment and Plan:   79 year old lady with history of PE in April 2019, CAD, pacemaker, presents for evaluation of the above issues. She has been on coumadin for 6 months and has been tolerating it. The PE was mostly provoked in the setting of recent hospitalization for complete heart block requiring PPM placement. Her follow up VQ scan and echocardiogram are encouraging. I think she can safely stop coumadin at this point per ACCP antithrombotic guidelines since she has completed 6 months of anticoagulation.    Recommendations:  - OK to stop the coumadin  - she'll let us know if she experiences any chest pain, shortness of breath or tachycardia    Follow up as needed.      CCx: hospital discharge follow up,  History of PE    HPI: 79 year old lady with history of PE in April 2019, CAD, pacemaker, presents for evaluation of shortness of breath and history of PE. She had a pacemaker placed last March for severe bradycardia and complete heart block. She was in the hospital for 2-3 days and was apparently discharged in stable condition. She then came back to the ER in late April 2019, about a month after pacemaker  placement. She was found to have small acute PE's without right heart strain.   She was started on LMWH and coumadin and discharged (was not admitted, sent home from the ER).  A follow up echo in July showed normal TAPSE and RV function  A VQ scan in July showed low probability of PE. She had a negative DVT study in April 2019.  Today she says she's generally doing well. She exercises regularly and climbs stairs without dyspnea on exertion. Denies chest pain or pressure. She had her pacer upgraded in August to a bi-V pacer due to history of CHF with reduced EF.  She has no significant exercise limitation, but does admit to some dyspnea with heavy exertion.   She is wondering If she can stop her coumadin.    ROS:  A 12-system review was obtained and was negative with the exception of the symptoms endorsed in the history of present illness.    PMH:  Past Medical History:   Diagnosis Date   ? Anxiety    ? Arthritis    ? Asthma    ? Colitis     Collagenous   ? Complete heart block (H)    ? Hypertriglyceridemia    ? Macular degeneration    ? Osteopenia    ? Osteoporosis    ? Pacemaker    ? Restless legs    ? Skin cancer, basal cell        PSH:  Past Surgical History:   Procedure Laterality Date   ? APPENDECTOMY     ? BREAST EXCISIONAL BIOPSY Right [id number]   ? BREAST SURGERY      biopsy   ? BUNIONECTOMY     ? EP PACEMAKER INSERT N/A 3/29/2019    Aaron Babcock MD;  Location: [facility] Cath Lab;  Service: Cardiology   ? IR EXTREMITY VENOGRAM LEFT  7/25/2019   ? MD TOTAL KNEE ARTHROPLASTY Left 8/30/2018    Procedure:  LEFT MINIMALLY INVASIVE TOTAL KNEE ARTHROPLASTY;  Surgeon: Segun Banks MD;  Location: [facility];  Service: Orthopedics   ? MD TOTAL KNEE ARTHROPLASTY Right 11/8/2018    Procedure: RIGHT MINIMALLY INVASIVE TOTAL KNEE ARTHROPLASTY;  Surgeon: Segun Banks MD;  Location: [facility] OR;  Service: Orthopedics   ? ROTATOR CUFF REPAIR     ? TONSILLECTOMY         Allergies:  Allergies    Allergen Reactions   ? Nsaids (Non-Steroidal Anti-Inflammatory Drug) Other (See Comments)     Patient has history of microcolitis.  GI aggravation   ? Sulfa (Sulfonamide Antibiotics)      Eye drops caused burning       Family HX:  Family History   Problem Relation Age of Onset   ? Breast cancer Cousin    ? Heart disease Mother    ? Alzheimer's disease Father    ? Clotting disorder Neg Hx        Social Hx:  Social History     Socioeconomic History   ? Marital status:      Spouse name: Not on file   ? Number of children: Not on file   ? Years of education: Not on file   ? Highest education level: Not on file   Occupational History   ? Not on file   Social Needs   ? Financial resource strain: Not on file   ? Food insecurity:     Worry: Not on file     Inability: Not on file   ? Transportation needs:     Medical: Not on file     Non-medical: Not on file   Tobacco Use   ? Smoking status: Former Smoker     Packs/day: 0.50     Years: 2.00     Pack years: 1.00     Last attempt to quit: 1960     Years since quittin.9   ? Smokeless tobacco: Never Used   Substance and Sexual Activity   ? Alcohol use: Yes     Alcohol/week: 7.0 standard drinks     Types: 7 Glasses of wine per week     Comment: per week   ? Drug use: No   ? Sexual activity: Not on file   Lifestyle   ? Physical activity:     Days per week: Not on file     Minutes per session: Not on file   ? Stress: Not on file   Relationships   ? Social connections:     Talks on phone: Not on file     Gets together: Not on file     Attends Baptism service: Not on file     Active member of club or organization: Not on file     Attends meetings of clubs or organizations: Not on file     Relationship status: Not on file   ? Intimate partner violence:     Fear of current or ex partner: Not on file     Emotionally abused: Not on file     Physically abused: Not on file     Forced sexual activity: Not on file   Other Topics Concern   ? Not on file   Social History  "Narrative   ? Not on file       Current Meds:  Current Outpatient Medications   Medication Sig Dispense Refill   ? acetaminophen (TYLENOL) 500 MG tablet Take 1,000 mg by mouth 2 (two) times a day.      ? bismuth subsalicylate (BISMUTH SUBSALICYLATE) 262 mg Chew chew tab Chew 3 tablets 3 (three) times a day.      ? CALCIUM CITRATE ORAL Take 600 mg by mouth 2 (two) times a day.      ? cholecalciferol, vitamin D3, (VITAMIN D3) 1,000 unit capsule Take 1,000 Units by mouth daily.     ? cyanocobalamin 1000 MCG tablet Take 500 mcg by mouth daily.     ? gabapentin (NEURONTIN) 300 MG capsule Take 300-600 mg by mouth at bedtime.            ? KRILL OIL ORAL Take 1 capsule by mouth daily.      ? magnesium oxide 250 mg Tab Take 250 mg by mouth at bedtime.      ? warfarin (COUMADIN/JANTOVEN) 3 MG tablet Take 1 tablet (3 mg total) by mouth daily. (Patient taking differently: Take 4.5mg on Mondays, Wednesdays and Fridays; and 3mg all other days of the week      ) 30 tablet      No current facility-administered medications for this visit.        Physical Exam:  /62   Pulse 63   Resp 16   Ht 5' 2\" (1.575 m)   Wt 110 lb (49.9 kg)   SpO2 98% Comment: RA  BMI 20.12 kg/m     Gen: awake, alert, oriented, no distress  HEENT: nasal turbinates are unremarkable, no oropharyngeal lesions, no cervical or supraclavicular lymphadenopathy  CV: RRR, no M/G/R  Resp: CTAB, no focal crackles or wheezes  Abd: soft, nontender, no palpable organomegaly  Skin: no apparent rashes  Ext: no cyanosis, clubbing or edema  Neuro: alert, nonfocal    Labs:  Reviewed  CBC, chem panel unremarkable.    Imaging studies:  VQ scan  IMPRESSION:   CONCLUSION:  1.  Low probability of pulmonary emboli.    CTA chest April 2019  IMPRESSION:   CONCLUSION:  1.  Small acute pulmonary emboli in a segmental pulmonary artery right lower lobe and left upper lobe. No central emboli. Borderline enlargement central pulmonary arteries but no definite CT signs of right heart " strain. Discussed with Dr. Mitchell 6:50   PM 04/26/2019.  2.  Mild cylindrical bronchiectasis.    Echo July 2019    1.Left ventricle ejection fraction is moderately decreased. The calculated left ventricular ejection fraction is 44%.    2.TAPSE is normal, which is consistent with normal right ventricular systolic function.    3.Mild tricuspid, mild to moderate pulmonic insufficiency, trace mitral regurgitation and mild to moderate aortic insufficiency.    4.When compared to the previous study dated 6/4/2019, small circumferential pericardial effusion has resolved.    US legs  IMPRESSION:   CONCLUSION:  1.  Bilateral leg veins are negative for DVT    PFT's   None available    Norbert (Evangelist) MD [place]  [facility] Pulmonary & Critical Care  Pager [phone]  Clinic [phone]

## 2021-06-19 NOTE — LETTER
Letter by Alondra Corbett EPS at      Author: Alondra Corbett EPS Service: -- Author Type: --    Filed:  Encounter Date: 5/7/2019 Status: (Other)         Helena Brown  [address] [facility] [address]      May 7, 2019      Dear Ms. Brown,    RE: Remote Results    We are writing to you regarding your recent Remote Pacemaker check from home. Your transmission was received successfully. Battery status is satisfactory at this time.     Your results are within normal limits.    Your next device appointment will be a clinic visit on June 28th, 2019 at 10:20am  at our  [place] location, [address] [place] [address].    To schedule or reschedule, please call [phone] and press 1.    NOTE: If you would like to do an extra transmission, please call [phone] and press 3 to speak to a nurse BEFORE transmitting. This ensures that the Device Clinic staff is aware of the reason you are sending a transmission, and can follow-up with you after it has been reviewed.    We will be checking your implanted device from home (remotely) every three months unless otherwise instructed. We will need to see you in the clinic at least once a year. You may need to be seen in the clinic sooner depending on the results of your check.    Please be aware:    The follow-up schedule is like a Physician prescription.    Your remote monitor is paired to your specific implanted device.      Sincerely,    [facility]

## 2021-06-19 NOTE — LETTER
Letter by Yvette Deleon RN at      Author: Yvette Deleon RN Service: -- Author Type: --    Filed:  Encounter Date: 7/30/2019 Status: (Other)       Helena Brown  [address]  [facility] [address]                          Dear Helena Brown,    Important Information for Your Procedure    You are having a Biventricular Pacemaker Implant Procedure:    Your procedure is scheduled for Monday 8/12/19    Please arrive at 6:00 am for registration and preporation for your procedure.   Getting Ready for Your Procedure:    Your recent office visit with the Cardiologist qualifies as your pre-operative history and physical prior to your procedure.    You will need to bring a current list of your medications with you for our admissions process the day of your procedure, please do not bring any of your own medications  with you to the hospital    The hospital cannot store your valuables, so please leave them at home    You will need to take off your jewelry prior to your procedure, we advise leaving your jewelry at home, as we cannot store your valuables    Please shower the morning of your procedure, or the night before    This procedure requires you to spend the night in the hospital overnight for observation. The hospital staff have asked that you arrange for your family/health  to be present at the hospital by 9:00 AM the following day to review your discharge instructions and transport you home from the hospital. Their goal is to have you discharged from the hospital by around 10:00AM.    Please make arrange for transportation home, as you will not be able to drive following your procedure  The Night Prior to Your Procedure:    Please do not have anything to eat or drink after Midnight (12:00am) prior to your procedure    It is important to stay well hydrated, and consume balanced meals the day prior to your procedure  Arriving for Your Procedure:    Please arrive at [facility], located  at: [address] [address]      parking is available after 6:00am for your convenience, parking is also available in the parking ramp located off of [address] attached to the hospital    If you park in the ramp located on [address], take the elevator to level one. Enter the doors to the atrium/lobby area and check in at the main reception desk.     Please check in at the main reception desk in the lobby    You will be assisted to Cardiac Special Care on the third floor.  Going Home:    The physician and/or nurse will review any restrictions, follow-up requirements, and medication instructions prior to going home from the hospital in detail    Listed below are the restrictions that you can anticipating having after your procedure:  o For four weeks, with your device implant arm DO NOT:    Raise your arm above the height of your shoulder    Perform any vigorous arm movements    Swim, golf, wash windows, shovel snow or vacuum    Lift greater than 10-15 pounds  o You will not be able to shower for 3 days after your procedure, to reduce the risk for infection and disrupting your incision site.  o You will not be allowed to drive for 3 days after your procedure.  Clinic Contact Information:    You can contact our main clinic line at any time with questions, concerns, or if you need further information: [facility] [phone]    If you have further questions in regards to the scheduling of you procedure, please contact [facility] at [phone]    Medication prior to your procedure:    Please take your morning medications the day of your procedure with sips of water, except any multivitamins or supplements.    Continue taking your Coumadin as ordered, if your Coumadin dose needs to be adjusted I will contact you prior to your procedure., You will need to have your INR checked 2-3 days prior to your procedure, to make sure your INR is suitable for  your procedure. Please call your clinic that manages your INR to make this appointment and If you have any questions regarding your medication prior to your procedure please contact me at the number listed below.      Sincerely,  Yvette Deleon RN  Please call with any questions or concerns regarding the procedure at : [phone]        Your Hearts Electrical System  Electrical signals cause the heart to pump blood throughout the body.  The heart has a special system that creates and sends electrical signals to the heart muscle to trigger the heart to beat.         What is Heart Failure?  The heart is a muscle that pumps oxygen-rich blood to the whole body. With heart failure, your heart is not pumping as well as it should. It does not mean your heart has completely failed.  Heart failure is also called congestive heart failure (CHF) because blood and fluid often backs up into the lungs and other parts of your body. This back up of fluid (congestion) can lead to symptoms like increased shortness of breath, sudden weight gain, and increased swelling in your ankles or legs.     When the heart muscle is weakened or damaged, the electrical impulses that travel through the heart can be delayed or travel slower through the conduction pathways.  This is known as conduction system disease or a bundle branch block. When this occurs, the ventricles of the heart do not contract at the same time, causing dysynchrony of the heart. This results in less blood being pumped out of the heart with each beat, causing heart failure symptoms that are listed above.     Why would a Cardiac Resynchronization Therapy Pacemaker (CRT-P) be needed? What does it do?  Individuals with heart failure, who also have conduction system disease may benefit from a special type of pacemaker designed to improve pumping function by pacing the heart to beat in a manner that synchronizes the heart.  This is also known as a Cardiac  Resynchronization Therapy Pacemaker (CRT-P). When placing this type of pacemaker an extra lead is implanted into the vein on the left side of the heart. This extra lead paces the left side of the heart causing early contraction, so both the left and right ventricles of the heart beat together.  This can improve the pumping function of the heart resulting in better exercise tolerance, less heart failure and associated symptoms, and can improve survival.      Why would a Cardiac Resynchronization Therapy Implantable Cardioverter Defibrillator (CRT-D) be needed? What does it do?  If you have heart failure, are at increased risk of sudden cardiac arrest, and have conduction system disease your provider may recommend a Cardiac Resynchronization Therapy Implantable Cardioverter Defibrillator (CRT-D). This type of device has the ability to provide synchronized pacing as mentioned above to improve pumping function, while also being able to deliver a shock in event of a cardiac arrest.  In an event of a cardiac arrest in individuals without an implantable cardioverter defibrillator (ICD), a patient will suddenly collapse and someone would need to start CPR and call 911. Typically, the paramedics or someone trained to use an AED would need to deliver a shock in order to restore normal beating.  Without immediate action, most patients do not survive a cardiac arrest. Survivors of a cardiac arrest would receive an ICD to treat another arrest if it recurs. An ICD is a device that could automatically detect a cardiac arrest and deliver lifesaving shocks.   In patients that have severe heart disease, it has been found that placing an ICD before a cardiac arrest can improve a patients survival rate by about 20% over 5 years. An ICD can automatically sense a life threatening arrhythmia and deliver a lifesaving shock directly to the heart within about 10 seconds. An ICD can improve your chances of surviving a cardiac arrest from 15%  to 90%. All ICDs act as pacemakers to prevent slow heart rhythms if needed.         Having a Cardiac Resynchronization Therapy Pacemaker (CRT-P) of Defibrillator (CRT-D) Implanted  A CRT-P and CRT-D are the size of a large pocket watch and the battery will last anywhere from 6 to 12 years. This device will need to be changed when the battery is low. The wires are inserted through a large vein below your collarbone, they are then directed through the veins into the hearts right upper and lower chambers, as well as the left lower chamber. The wires will often last a lifetime.   If an ICD is being placed, your physician may choice to induce an arrhythmia that causes the device to shock your heart to make sure that the ICD functions normally.  An electric shock is given immediately on this outside of the chest if the ICD shock does not work.  During this testing you will be asleep under general anesthesia for around 5 minutes.    The risks of complication are very low. The risk of a needle or wire puncturing a hole in the lung or heart is less than 1%.  There is less than 1% chance of infection. There is less than 3% risk that the wires in your heart will move. This could require reoperation to reposition the leads usually in the first day. It is common to have some bruising, bleeding or swelling in the area of the device.  The risk of dying of a complication with this procedure is less than 1/1000, about the risk of dying with an appendectomy.   You will be told how to care for your incision at the time of the procedure.         What to expect with a Cardiac Resynchronization Therapy Device    You can use all normal household appliances.      You should keep a cell phone 6 inches from your device so use it on the ear opposite your device.    You should avoid strong magnets and arc welding.  You should not stand inside the security bars at a store, but can walk through them without problems.     You can fly on an  airplane, but you will need to show a card that you will get at the time of your device procedure that you have a device.    You usually will not be able to have an MRI.    Before you have a procedure or surgery, you need to let them know you have a device and special precautions may be taken at the time of your procedure or surgery.    Preparing for your Procedure    You will not be allowed to eat or drink 8 hours before your procedure. You will be given further instructions by your provider or a nurse regarding the medication you will take the morning of your procedure.    You will need to arrange to have a  take you home from the hospital; you will not be permitted to drive for 3 days after your procedure.    You are allowed to bring personal items and clothing to the hospital, but please refrain from bringing any valuables.     You will need to bring a list of the names and dosages of the medication you are taking to the hospital.    It is important to mention to your provider or nurse if you have any allergies, reactions to anesthesia, or have a history of bleeding problems.    During the Procedure  Your procedure typically takes around 1-3 hour to implant.    The procedural area: You will be transferred back to the procedure room once you have been appropriately prepped by the nursing staff and you are ready for your device implant.    Sedation: You will get medication to help you relax and to keep you comfortable throughout the procedure, during this time you will be awake enough to breathe on your own. You will be able to communicate with the doctor and nurse throughout the procedure, and are encouraged to tell them if you are uncomfortable and/or need more medication at any time.    Insertion of your device: A local anesthetic is given by injection to numb the skin in the area where the device will be inserted. You may feel pressure at the insertion site during the procedure, but this medication is  used to make sure you do not feel pain. A small 3-4 inch incision is made to create a small pocket where your device will be placed, and this will also be used to access a large vein where the device leads will be inserted.    Finishing up: Your doctor will connect the generator to the leads, insure proper function, test the shocking capability of your device if warranted, and suture the incision closed. Youre then transferred to the cardiac floor of the hospital for observation overnight.    After the Procedure    You will stay in the hospital overnight.    Before leaving the hospital your device settings will be rechecked, and you will have a chest x-ray. After these results are reviewed, you will able to leave the hospital typically by late morning.    Follow the instructions you are given for caring for the device site. You will likely be told not to raise the arm above your shoulder on the side the device was inserted for several weeks after. You need to avoid sport and activities where you lift weight or use the arm to push or throw something.      Check your incision for signs of infection every day for a week. It is normal to have some swelling over the device especially if you are on a blood thinner.  You can put a cloth over the device and ice in a bag to help decrease the swelling for up to 20 minutes 4 times a day.      Return for a follow-up visit in the device clinic in about a week and you will be given this appointment while in the hospital.    When you are in the hospital, you will get a list of things to watch for and you should call our clinic if you have any of these symptoms.

## 2021-06-20 NOTE — ANESTHESIA PROCEDURE NOTES
Spinal Block    Patient location during procedure: OR  Start time: 8/30/2018 10:42 AM  End time: 8/30/2018 10:44 AM  Reason for block: at surgeon's request and primary anesthetic    Staffing:  Performing  Anesthesiologist: LEN VEGA    Preanesthetic Checklist  Completed: patient identified, risks, benefits, and alternatives discussed, timeout performed, consent obtained, at patient's request, airway assessed, oxygen available, suction available, emergency drugs available and hand hygiene performed  Spinal Block  Patient position: sitting  Prep: ChloraPrep and site prepped and draped  Patient monitoring: heart rate, cardiac monitor, continuous pulse ox and blood pressure  Approach: right paramedian  Location: L3-4  Injection technique: single-shot  Needle type: pencil-tip   Needle gauge: 24 G

## 2021-06-20 NOTE — ANESTHESIA POSTPROCEDURE EVALUATION
Patient: Helena Brown   LEFT MINIMALLY INVASIVE TOTAL KNEE ARTHROPLASTY  Anesthesia type: spinal    Patient location: PACU  Last vitals:   Vitals:    08/30/18 1331   BP: 142/77   Pulse: 60   Resp: 16   Temp: 36.5  C (97.7  F)   SpO2: 100%     Post vital signs: stable  Level of consciousness: awake and responds to simple questions  Post-anesthesia pain: pain controlled  Post-anesthesia nausea and vomiting: no  Pulmonary: unassisted, return to baseline  Cardiovascular: stable and blood pressure at baseline  Hydration: adequate  Anesthetic events: no    QCDR Measures:  ASA# 11 - Edlilah-op Cardiac Arrest: ASA11B - Patient did NOT experience unanticipated cardiac arrest  ASA# 12 - Delilah-op Mortality Rate: ASA12B - Patient did NOT die  ASA# 13 - PACU Re-Intubation Rate: NA - No ETT / LMA used for case  ASA# 10 - Composite Anes Safety: ASA10A - No serious adverse event    Additional Notes:

## 2021-06-20 NOTE — LETTER
Letter by Yojana Ascencio RDCS at      Author: Yojana Ascencio RDCS Service: -- Author Type: --    Filed:  Encounter Date: 2/19/2020 Status: (Other)         Helena Brown  [address] [facility] [address]      February 19, 2020      Dear Ms. Brown,    RE: Remote Results    We are writing to you regarding your recent Remote Pacemaker check from home. Your transmission was received successfully. Battery status is satisfactory at this time.     Your results are being reviewed.  You will be contacted if further information is necessary.     Your next device appointment will be a clinic visit.  Please call in mid-March to schedule.      To schedule or reschedule, please call [phone] and press 1.    NOTE: If you would like to do an extra transmission, please call [phone] and press 3 to speak to a nurse BEFORE transmitting. This ensures that the Device Clinic staff is aware of the reason you are sending a transmission, and can follow-up with you after it has been reviewed.    We will be checking your implanted device from home (remotely) every three months unless otherwise instructed. We will need to see you in the clinic at least once a year. You may need to be seen in the clinic sooner depending on the results of your check.    Please be aware:    The follow-up schedule is like a Physician prescription.    Your remote monitor is paired to your specific implanted device.      Sincerely,    [facility]

## 2021-06-20 NOTE — LETTER
Letter by Kyra Valencia RN at      Author: Kyra Valencia RN Service: -- Author Type: --    Filed:  Encounter Date: 8/28/2020 Status: (Other)         Helena Brown  [address] [facility] [address]             August 28, 2020         Dear Ms. Brown,    Below are the results from your recent visit:    Resulted Orders   HM2(CBC w/o Differential)   Result Value Ref Range    WBC 5.5 4.0 - 11.0 thou/uL    RBC 4.08 3.80 - 5.40 mill/uL    Hemoglobin 12.1 12.0 - 16.0 g/dL    Hematocrit 37.3 35.0 - 47.0 %    MCV 91 80 - 100 fL    MCH 29.7 27.0 - 34.0 pg    MCHC 32.4 32.0 - 36.0 g/dL    RDW 13.4 11.0 - 14.5 %    Platelets 174 140 - 440 thou/uL    MPV 9.6 8.5 - 12.5 fL     The test results show that your current treatment is working. Please continue your current medication and plan.       Please call with questions or contact us using [employer].    Sincerely,        Electronically signed by Kyra Valencia RN

## 2021-06-20 NOTE — ANESTHESIA PREPROCEDURE EVALUATION
Anesthesia Evaluation      Patient summary reviewed   No history of anesthetic complications     Airway   Mallampati: II  Neck ROM: full   Pulmonary     breath sounds clear to auscultation  (+) asthma                           Cardiovascular - negative ROS  ECG reviewed  Rate: normal,         Neuro/Psych    (+) anxiety/panic attacks,     Endo/Other - negative ROS      GI/Hepatic/Renal      Comments: Hx colitis          Dental - normal exam                        Anesthesia Plan  Planned anesthetic: spinal  Adductor canal block  Propofol drip  Ketamine 0.5mg/kg pre-incision  Decadron   Zofran  Possibility of GA/LMA/ETT discussed.    ASA 2     Anesthetic plan and risks discussed with: patient, spouse and child/children  Anesthesia plan special considerations: antiemetics,   Post-op plan: routine recovery

## 2021-06-20 NOTE — ANESTHESIA CARE TRANSFER NOTE
Last vitals:   Vitals:    08/30/18 1240   BP: 119/64   Pulse: 68   Resp: (!) 47   Temp:    SpO2: 100%     Patient's level of consciousness is drowsy  Spontaneous respirations: yes  Maintains airway independently: yes  Dentition unchanged: yes  Oropharynx: oropharynx clear of all foreign objects    QCDR Measures:  ASA# 20 - Surgical Safety Checklist: WHO surgical safety checklist completed prior to induction  PQRS# 430 - Adult PONV Prevention: 4558F - Pt received => 2 anti-emetic agents (different classes) preop & intraop  ASA# 8 - Peds PONV Prevention: NA - Not pediatric patient, not GA or 2 or more risk factors NOT present  PQRS# 424 - Delilah-op Temp Management: 4559F - At least one body temp DOCUMENTED => 35.5C or 95.9F within required timeframe  PQRS# 426 - PACU Transfer Protocol: - Transfer of care checklist used  ASA# 14 - Acute Post-op Pain: ASA14B - Patient did NOT experience pain >= 7 out of 10    Temperature 99.1 temporal

## 2021-06-20 NOTE — ANESTHESIA PROCEDURE NOTES
Peripheral Block    Patient location during procedure: pre-op  Start time: 8/30/2018 9:38 AM  End time: 8/30/2018 9:40 AM  post-op analgesia per surgeon order as noted in medical record  Staffing:  Performing  Anesthesiologist: LEN VEGA  Preanesthetic Checklist  Completed: patient identified, site marked, risks, benefits, and alternatives discussed, timeout performed, consent obtained, at patient's request, airway assessed, oxygen available, suction available, emergency drugs available and hand hygiene performed  Peripheral Block  Block type: saphenous, adductor canal block  Prep: ChloraPrep  Patient position: supine  Patient monitoring: cardiac monitor, continuous pulse oximetry, heart rate and blood pressure  Laterality: left  Injection technique: ultrasound guided    Ultrasound used to visualize needle placement in proximity to nerve being blocked: yes   Permanent ultrasound image captured for medical record  Sterile gel and probe cover used for ultrasound.    Needle  Needle type: Stimuplex   Needle gauge: 20G  Needle length: 4 in  no peripheral nerve catheter placed  Assessment  Injection assessment: no difficulty with injection, negative aspiration for heme, no paresthesia on injection and incremental injection

## 2021-06-21 NOTE — ANESTHESIA CARE TRANSFER NOTE
Last vitals:   Vitals:    11/08/18 1432   BP: 104/57   Pulse: 86   Resp: 15   Temp: 37.3  C (99.1  F)   SpO2: 95%     Patient's level of consciousness is awake  Spontaneous respirations: yes  Maintains airway independently: yes  Dentition unchanged: yes  Oropharynx: oropharynx clear of all foreign objects    QCDR Measures:  ASA# 20 - Surgical Safety Checklist: WHO surgical safety checklist completed prior to induction  PQRS# 430 - Adult PONV Prevention: 4558F - Pt received => 2 anti-emetic agents (different classes) preop & intraop  ASA# 8 - Peds PONV Prevention: NA - Not pediatric patient, not GA or 2 or more risk factors NOT present  PQRS# 424 - Delilah-op Temp Management: 4559F - At least one body temp DOCUMENTED => 35.5C or 95.9F within required timeframe  PQRS# 426 - PACU Transfer Protocol: - Transfer of care checklist used  ASA# 14 - Acute Post-op Pain: ASA14B - Patient did NOT experience pain >= 7 out of 10

## 2021-06-21 NOTE — ANESTHESIA PREPROCEDURE EVALUATION
Anesthesia Evaluation      Patient summary reviewed   No history of anesthetic complications     Airway   Mallampati: II  Neck ROM: full   Pulmonary - negative ROS and normal exam                          Cardiovascular - negative ROS and normal exam   Neuro/Psych - negative ROS     Endo/Other - negative ROS      GI/Hepatic/Renal - negative ROS           Dental - normal exam                        Anesthesia Plan  Planned anesthetic: spinal and peripheral nerve block    ASA 2     Anesthetic plan and risks discussed with: patient    Post-op plan: routine recovery

## 2021-06-21 NOTE — ANESTHESIA PROCEDURE NOTES
Peripheral Block    Patient location during procedure: pre-op  Start time: 11/8/2018 12:05 PM  End time: 11/8/2018 12:10 PM  post-op analgesia per surgeon order as noted in medical record  Staffing:  Performing  Anesthesiologist: SHANTAL DIOP  Preanesthetic Checklist  Completed: patient identified, site marked, risks, benefits, and alternatives discussed, timeout performed, consent obtained, at patient's request, airway assessed, oxygen available, suction available, emergency drugs available and hand hygiene performed  Peripheral Block  Block type: saphenous  Prep: ChloraPrep  Patient position: supine  Patient monitoring: continuous pulse oximetry, heart rate, blood pressure and cardiac monitor  Laterality: right  Injection technique: ultrasound guided            Needle  Needle type: Stimuplex   Needle gauge: 22 G  Needle length: 4 in    Assessment  Injection assessment: negative aspiration for heme, no paresthesia on injection, incremental injection and no difficulty with injection

## 2021-06-21 NOTE — LETTER
Letter by Meron Zimmerman RN at      Author: Meron Zimmeramn RN Service: -- Author Type: --    Filed:  Encounter Date: 3/1/2021 Status: (Other)         Helena Brown  [address] [facility] [address]      March 2, 2021      Dear Ms. Brown,    RE: Remote Results    We are writing to you regarding your recent Remote Pacemaker check from home. Your transmission was received successfully. Battery status is satisfactory at this time.     Your results are within normal limits.    Your next device appointment will be a remote check on 6/3/21; this will occur automatically.    To schedule or reschedule, please call [phone] and press 1.    NOTE: If you would like to do an extra transmission, please call [phone] and press 3 to speak to a nurse BEFORE transmitting. This ensures that the Device Clinic staff is aware of the reason you are sending a transmission, and can follow-up with you after it has been reviewed.    We will be checking your implanted device from home (remotely) every three months unless otherwise instructed. We will need to see you in the clinic at least once a year. You may need to be seen in the clinic sooner depending on the results of your check.    Please be aware:    The follow-up schedule is like a Physician prescription.    Your remote monitor is paired to your specific implanted device.      Sincerely,    [facility] Heart Care Device Clinic

## 2021-06-21 NOTE — ANESTHESIA PROCEDURE NOTES
Spinal Block    Patient location during procedure: OR  Start time: 11/8/2018 12:50 PM  End time: 11/8/2018 12:55 PM  Reason for block: primary anesthetic    Staffing:  Performing  Anesthesiologist: SHANTAL DIOP    Preanesthetic Checklist  Completed: patient identified, risks, benefits, and alternatives discussed, timeout performed, consent obtained, at patient's request, airway assessed, oxygen available, suction available, emergency drugs available and hand hygiene performed  Spinal Block  Patient position: sitting  Prep: ChloraPrep  Patient monitoring: heart rate, cardiac monitor, continuous pulse ox and blood pressure  Approach: midline  Location: L3-4  Injection technique: single-shot  Needle type: pencil-tip   Needle gauge: 24 G    Assessment  Sensory level: T10

## 2021-06-21 NOTE — LETTER
Letter by Madison Andrew at      Author: Madison Andrew Service: -- Author Type: --    Filed:  Date of Service:  Status: (Other)       Helena Brown  [address] [facility] [address]              Examination Date:  10/19/20    Dear Helena,  We are pleased to let you know the results of your recent Breast Imaging Study(s) done on 10/19/20 show no sign of breast cancer.     In response to the new [place] Density Reporting Law, Chapter 291, HF 2402. [144.1212] effective August 1, 2014, we are required to inform you of your breast density. Your mammogram shows that your breast tissue is dense. Dense breast tissue is relatively common and is found in more than 40 percent of women. However, dense breast tissue may make it more difficult to identify precancerous lesions or cancer through a mammogram and may also be associated with an increased risk of breast cancer. This information about the results of your mammogram is given to you to raise your own awareness and to help inform your conversations with your treating clinician who has received a report of your mammogram results. Together you can decide which screening options are right for you based on your mammogram results, individual risk factors, or physical examination.    A complete report of your results was sent to: Qasim Welsh.    Even though mammograms are the best method we have for early detection, not all cancers are found with mammograms. If you feel a lump or have any other reasons for concern, you should tell your health care provider.     Your images will be on file for your ongoing care. If, in the future, you change health care providers or go to a different location for breast imaging, you should tell them where this breast imaging was performed.     Thank you for choosing [facility].  We appreciate the opportunity of participating in your health care and look forward to meeting your future needs.    Sincerely,  The Mammography Staff  Jacqueline  Sabrina Chong MD  [facility] [place]    American Cancer Society Breast Cancer  Screening Guideline for women with average risk  Age 40-44: Talk with your doctor about when to begin screening . Women should have the opportunity to begin screening if they choose.   Age 45-54: Begin yearly mammograms by age 45.   Age 55 and beyond: Transition to mammograms every other year at age 55 or continue with annual mammography, depending on your preferences. Continue to have regular mammograms for as long as youre in good health.

## 2021-06-21 NOTE — LETTER
Letter by Hailey Craig at      Author: Hailey Craig Service: -- Author Type: --    Filed:  Encounter Date: 11/30/2020 Status: (Other)         Helena KESHIA Brown  [address] [facility] [address]      November 30, 2020      Dear Ms. Brown,    RE: Remote Results    We are writing to you regarding your recent Remote Pacemaker check from home. Your transmission was received successfully. Battery status is satisfactory at this time.     Your results are showing no significant changes.    Your next device appointment will be a remote check on March 1, 2021; this will occur automatically.    To schedule or reschedule, please call [phone] and press 1.    NOTE: If you would like to do an extra transmission, please call [phone] and press 3 to speak to a nurse BEFORE transmitting. This ensures that the Device Clinic staff is aware of the reason you are sending a transmission, and can follow-up with you after it has been reviewed.    We will be checking your implanted device from home (remotely) every three months unless otherwise instructed. We will need to see you in the clinic at least once a year. You may need to be seen in the clinic sooner depending on the results of your check.    Please be aware:    The follow-up schedule is like a Physician prescription.    Your remote monitor is paired to your specific implanted device.      Sincerely,    [facility] Care Device Clinic

## 2021-06-27 NOTE — PROGRESS NOTES
Progress Notes by Reese Tom DO at 5/7/2019 11:10 AM     Author: Reese Tom DO Service: -- Author Type: Physician    Filed: 5/7/2019  1:56 PM Encounter Date: 5/7/2019 Status: Signed    : Reese Tom DO (Physician)           Click to link to [facility] Heart Care     [facility] HEART CARE CONSULTATION NOTE      Assessment/Recommendations   Assessment:    1. 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  Will device function status post dual-chamber pacemaker.  Recent device interrogation was reviewed  2.  Acute congestive heart failure secondary to left ventricular systolic dysfunction reduced left ventricular ejection fraction.  Likely be related to left ventricular dyssynchrony.  Needs optimal medical therapy before considering CRT  3. Atypical chest pain  4. Exertional dyspnea   5.  Recent acute pulmonary embolism    Plan:  1.  Coronary CT angiogram to assess for obstructive coronary disease as a cause of her significant left ventricular systolic dysfunction.  With reduced ejection fraction.  2.  Start lisinopril 5 mg daily for congestive heart failure  3. Lasix 20 mg as needed for dyspnea  4. Start metoprolol XL 25 mg daily at next visit  5. Start spinolactone 25 mg at next visit   6. Continue anticoagulation for pulmonary embolism    Close outpatient follow-up.        History of Present Illness/Subjective    Ms. Helena Brown is a 78 y.o. female with recent history of complete AV block status post dual-chamber pacemaker, recent development of acute congestive heart failure secondary to reduced left ventricular ejection fraction, recent acute pulmonary embolism who presents in cardiology clinic in follow-up.    Since discharge patient was evaluated by Dr. Baker in cardiology clinic for atypical chest pain.  At that time it was noted that she had a reduced left ventricular ejection fraction was started on Lasix  therapy.  For some reason she was not started on beta-blocker or ACE inhibitor.  We are starting her ACE inhibitor today.  Given ongoing chest pain she underwent CT angiogram which demonstrated acute pulmonary embolism.  She has been started on warfarin therapy.    Currently today she notes no lower extremity edema, PND symptoms or abdominal fullness.  Weight is been stable.  She denies any improvement with starting Lasix therapy.  She is willing to start lisinopril today.  Advised that we should start ACE inhibitor and Aldactone in the near future which she is agreeable.  We will start this at the next visit.    No complications at device site.    Recent echocardiogram was reviewed.  Agree with read of reduced left ventricular ejection fraction.  No severe valve issues other than mild aortic stenosis mild mitral regurgitation.     Physical Examination Review of Systems   Vitals:    05/07/19 1114   BP: 118/58   Pulse: (!) 56   Resp: 16     Body mass index is 20.3 kg/m .  Wt Readings from Last 3 Encounters:   05/07/19 111 lb (50.3 kg)   04/26/19 110 lb (49.9 kg)   04/23/19 112 lb (50.8 kg)       Intake/Output Summary (Last 24 hours) at 3/29/2019 0811  Last data filed at 3/29/2019 0600  Gross per 24 hour   Intake --   Output 600 ml   Net -600 ml     General Appearance:   no distress, normal body habitus   ENT/Mouth: membranes moist, no oral lesions or bleeding gums.      EYES:  no scleral icterus, normal conjunctivae   Neck: no carotid bruits or thyromegaly   Chest/Lungs:   lungs are clear to auscultation, no rales or wheezing, no sternal scar, equal chest wall expansion.  Device noted in the left upper chest wall.  Incision is healed with no sign of inflammation or hematoma.   Cardiovascular:   Regular. Normal first and second heart sounds with no murmurs, rubs, or gallops; the carotid, radial and posterior tibial pulses are intact, Jugular venous pressure normal, no edema bilaterally.      Abdomen:  no organomegaly,  masses, bruits, or tenderness; bowel sounds are present   Extremities: no cyanosis or clubbing   Skin: no xanthelasma, warm.    Neurologic: normal  bilateral, no tremors     Psychiatric: alert and oriented x3, calm     General: WNL  Eyes: WNL  Ears/Nose/Throat: WNL  Lungs: Shortness of Breath  Heart: Chest Pain, Shortness of Breath with activity  Stomach: WNL  Bladder: WNL  Muscle/Joints: WNL  Skin: WNL  Nervous System: WNL  Mental Health: WNL     Blood: WNL       Medical History  Surgical History Family History Social History   Past Medical History:   Diagnosis Date   ? Anxiety    ? Arthritis    ? Asthma    ? Colitis     Collagenous   ? Complete heart block (H)    ? Hypertriglyceridemia    ? Macular degeneration    ? Osteopenia    ? Osteoporosis    ? Pacemaker    ? Restless legs     Past Surgical History:   Procedure Laterality Date   ? APPENDECTOMY     ? BREAST EXCISIONAL BIOPSY Right [id number]   ? BREAST SURGERY      biopsy   ? BUNIONECTOMY     ? EP PACEMAKER INSERT N/A 3/29/2019    Procedure: EP Pacemaker Insertion;  Surgeon: Aaron Babcock MD;  Location: [facility];  Service: Cardiology   ? INSERT / REPLACE / REMOVE PACEMAKER     ? JOINT REPLACEMENT     ? [place] TOTAL KNEE ARTHROPLASTY Left 8/30/2018    Procedure:  LEFT MINIMALLY INVASIVE TOTAL KNEE ARTHROPLASTY;  Surgeon: Segun Banks MD;  Location: [facility];  Service: Orthopedics   ? [place] TOTAL KNEE ARTHROPLASTY Right 11/8/2018    Procedure: RIGHT MINIMALLY INVASIVE TOTAL KNEE ARTHROPLASTY;  Surgeon: Segun Banks MD;  Location: [facility] OR;  Service: Orthopedics   ? ROTATOR CUFF REPAIR     ? TONSILLECTOMY      Family History   Problem Relation Age of Onset   ? Breast cancer Cousin    ? Heart disease Mother    ? Alzheimer's disease Father    ? Clotting disorder Neg Hx     Social History     Socioeconomic History   ? Marital status:      Spouse name: Not on file   ? Number of children: Not on file   ? Years of  education: Not on file   ? Highest education level: Not on file   Occupational History   ? Not on file   Social Needs   ? Financial resource strain: Not on file   ? Food insecurity:     Worry: Not on file     Inability: Not on file   ? Transportation needs:     Medical: Not on file     Non-medical: Not on file   Tobacco Use   ? Smoking status: Former Smoker     Last attempt to quit: 1960     Years since quittin.5   ? Smokeless tobacco: Never Used   Substance and Sexual Activity   ? Alcohol use: Yes     Alcohol/week: 4.2 oz     Types: 7 Glasses of wine per week     Comment: per week   ? Drug use: No   ? Sexual activity: Not on file   Lifestyle   ? Physical activity:     Days per week: Not on file     Minutes per session: Not on file   ? Stress: Not on file   Relationships   ? Social connections:     Talks on phone: Not on file     Gets together: Not on file     Attends Jew service: Not on file     Active member of club or organization: Not on file     Attends meetings of clubs or organizations: Not on file     Relationship status: Not on file   ? Intimate partner violence:     Fear of current or ex partner: Not on file     Emotionally abused: Not on file     Physically abused: Not on file     Forced sexual activity: Not on file   Other Topics Concern   ? Not on file   Social History Narrative   ? Not on file          Medications  Allergies   Scheduled Meds:    Continuous Infusions:    PRN Meds:. Allergies   Allergen Reactions   ? Nsaids (Non-Steroidal Anti-Inflammatory Drug) Other (See Comments)     Patient has history of microcolitis.  GI aggravation   ? Sulfa (Sulfonamide Antibiotics)      Eye drops caused burning         Lab Results    Chemistry/lipid CBC Cardiac Enzymes/BNP/TSH/INR   Lab Results   Component Value Date    CHOL 179 2018    HDL 61 2018    LDLCALC 105 2018    TRIG 67 2018    CREATININE 0.76 2019    BUN 20 2019    K 4.1 2019      04/26/2019     04/26/2019    CO2 26 04/26/2019    Lab Results   Component Value Date    WBC 7.1 04/26/2019    HGB 11.8 (L) 04/26/2019    HCT 36.8 04/26/2019    MCV 89 04/26/2019     04/26/2019    Lab Results   Component Value Date    TROPONINI 0.01 04/26/2019    BNP 84 04/26/2019    TSH 1.44 03/28/2019    INR 1.02 04/26/2019

## 2021-06-27 NOTE — PROGRESS NOTES
Progress Notes by Megan Wells CNP at 8/19/2019  8:30 AM     Author: Megan Wells CNP Service: -- Author Type: Nurse Practitioner    Filed: 8/19/2019  9:21 AM Encounter Date: 8/19/2019 Status: Signed    : Megan Wells CNP (Nurse Practitioner)           Click to link to [facility] HEART [facility] NOTE      Assessment/Recommendations   Assessment:    1.  Heart failure with reduced ejection fraction, NYHA class II: Compensated.  No signs of fluid retention.  She states her energy and dyspnea on exertion has improved since the upgrade of her device to a CRT-P.  We discussed monitoring symptoms, following a low-sodium diet, monitoring daily weights, and heart failure treatment.  She met with the nurse clinician for further heart failure education.    2.  History of pulmonary embolism: She continues warfarin for anticoagulation.  She sees her PMD in the next few weeks regarding this.    Plan:  1.  Continue current medications  2.  Continue daily weights and low-sodium diet  3.  Continue regular exercise    Helena Brown will follow up with Dr. Tom 6-8 weeks, Dr. Babcock in November and in the heart failure clinic as needed.     History of Present Illness    Ms. Helena Brown is a 79 [facility]. [facility]-up.  Her  Ray accompanies her today.  She follows up for heart failure with reduced ejection fraction.  She is a past medical history significant for PE and complete heart block.  She had a permanent pacer placed March 2019 which did not improve her symptoms.  She was having fatigue and dyspnea on exertion so she had an upgrade to a CRT P August 12, 2019.    Today, she states her energy and shortness of breath has improved since the upgrade of her device.  She denies dizziness or lightheadedness.  She denies orthopnea or PND.  She denies any abdominal bloating or lower extremity edema.   She denies lightheadedness, shortness of breath, orthopnea, PND, palpitations, chest pain, abdominal fullness/bloating and lower extremity edema.      She states her weight has been decreasing for the last few months.  She states her weight was 113 pounds and today was 106 pounds currently.  She is following a low sodium diet.     ECHO 7/26/2019:   Summary       1.Left ventricle ejection fraction is moderately decreased. The calculated left ventricular ejection fraction is 44%.    2.TAPSE is normal, which is consistent with normal right ventricular systolic function.    3.Mild tricuspid, mild to moderate pulmonic insufficiency, trace mitral regurgitation and mild to moderate aortic insufficiency.    4.When compared to the previous study dated 6/4/2019, small circumferential pericardial effusion has resolved.             Physical Examination Review of Systems   There were no vitals filed for this visit.  There is no height or weight on file to calculate BMI.  Wt Readings from Last 3 Encounters:   08/19/19 109 lb (49.4 kg)   08/16/19 108 lb 8 oz (49.2 kg)   08/13/19 105 lb 6.4 oz (47.8 kg)       General Appearance:     Alert, cooperative and in no acute distress.   ENT/Mouth: membranes moist, no oral lesions or bleeding gums.      EYES:  no scleral icterus, normal conjunctivae   Neck: no carotid bruits or thyromegaly   Chest/Lungs:   lungs are clear to auscultation, no rales or wheezing, respirations unlabored   Cardiovascular:   Regular. Normal first and second heart sounds with no murmurs, rubs, or gallops; the carotid, radial and posterior tibial pulses are intact, Jugular venous pressure normal, no edema     Abdomen:  Soft, nontender, nondistended, bowel sounds present   Extremities: no cyanosis or clubbing   Skin: warm, dry. Incision clean, dry and intact   Neurologic: mood and affect are appropriate, alert and oriented x3                                                Medical History  Surgical History Family  History Social History   Past Medical History:   Diagnosis Date   ? Anxiety    ? Arthritis    ? Asthma    ? Colitis     Collagenous   ? Complete heart block (H)    ? Hypertriglyceridemia    ? Macular degeneration    ? Osteopenia    ? Osteoporosis    ? Pacemaker    ? Restless legs    ? Skin cancer, basal cell     Past Surgical History:   Procedure Laterality Date   ? APPENDECTOMY     ? BREAST EXCISIONAL BIOPSY Right [id number]   ? BREAST SURGERY      biopsy   ? BUNIONECTOMY     ? EP PACEMAKER INSERT N/A 3/29/2019    Aaron Babcock MD;  Location: [facility];  Service: Cardiology   ? IR EXTREMITY VENOGRAM LEFT  2019   ? [place] TOTAL KNEE ARTHROPLASTY Left 2018    Procedure:  LEFT MINIMALLY INVASIVE TOTAL KNEE ARTHROPLASTY;  Surgeon: Segun Banks MD;  Location: [facility] OR;  Service: Orthopedics   ? [place] TOTAL KNEE ARTHROPLASTY Right 2018    Procedure: RIGHT MINIMALLY INVASIVE TOTAL KNEE ARTHROPLASTY;  Surgeon: Segun Banks MD;  Location: [facility] OR;  Service: Orthopedics   ? ROTATOR CUFF REPAIR     ? TONSILLECTOMY      Family History   Problem Relation Age of Onset   ? Breast cancer Cousin    ? Heart disease Mother    ? Alzheimer's disease Father    ? Clotting disorder Neg Hx     Social History     Socioeconomic History   ? Marital status:      Spouse name: Not on file   ? Number of children: Not on file   ? Years of education: Not on file   ? Highest education level: Not on file   Occupational History   ? Not on file   Social Needs   ? Financial resource strain: Not on file   ? Food insecurity:     Worry: Not on file     Inability: Not on file   ? Transportation needs:     Medical: Not on file     Non-medical: Not on file   Tobacco Use   ? Smoking status: Former Smoker     Packs/day: 0.50     Years: 2.00     Pack years: 1.00     Last attempt to quit: 1960     Years since quittin.8   ? Smokeless tobacco: Never Used   Substance and Sexual Activity   ? Alcohol  use: Yes     Alcohol/week: 4.2 oz     Types: 7 Glasses of wine per week     Comment: per week   ? Drug use: No   ? Sexual activity: Not on file   Lifestyle   ? Physical activity:     Days per week: Not on file     Minutes per session: Not on file   ? Stress: Not on file   Relationships   ? Social connections:     Talks on phone: Not on file     Gets together: Not on file     Attends Jewish service: Not on file     Active member of club or organization: Not on file     Attends meetings of clubs or organizations: Not on file     Relationship status: Not on file   ? Intimate partner violence:     Fear of current or ex partner: Not on file     Emotionally abused: Not on file     Physically abused: Not on file     Forced sexual activity: Not on file   Other Topics Concern   ? Not on file   Social History Narrative   ? Not on file          Medications  Allergies   Current Outpatient Medications   Medication Sig Dispense Refill   ? acetaminophen (TYLENOL) 500 MG tablet Take 1,000 mg by mouth 2 (two) times a day.      ? bismuth subsalicylate (BISMUTH SUBSALICYLATE) 262 mg Chew chew tab Chew 3 tablets 3 (three) times a day.      ? CALCIUM CITRATE ORAL Take 600 mg by mouth 2 (two) times a day.      ? cephalexin (KEFLEX) 500 MG capsule Take 1 capsule (500 mg total) by mouth 3 (three) times a day for 7 days. 21 capsule 0   ? cholecalciferol, vitamin D3, (VITAMIN D3) 1,000 unit capsule Take 1,000 Units by mouth daily.     ? cyanocobalamin 1000 MCG tablet Take 500 mcg by mouth daily.     ? gabapentin (NEURONTIN) 300 MG capsule Take 300-600 mg by mouth at bedtime.            ? KRILL OIL ORAL Take 1 capsule by mouth daily.      ? magnesium oxide 250 mg Tab Take 250 mg by mouth at bedtime.      ? warfarin (COUMADIN/JANTOVEN) 3 MG tablet Take 1 tablet (3 mg total) by mouth daily. (Patient taking differently: Take 4.5mg on Mondays and Fridays; and 3mg all other days of the week      ) 30 tablet      No current facility-administered  medications for this visit.       Allergies   Allergen Reactions   ? Nsaids (Non-Steroidal Anti-Inflammatory Drug) Other (See Comments)     Patient has history of microcolitis.  GI aggravation   ? Sulfa (Sulfonamide Antibiotics)      Eye drops caused burning         Lab Results    Chemistry CBC BNP   Lab Results   Component Value Date    CREATININE 0.84 08/12/2019    BUN 20 08/12/2019     08/12/2019    K 3.9 08/12/2019     08/12/2019    CO2 31 08/12/2019     Creatinine (mg/dL)   Date Value   08/12/2019 0.84   06/06/2019 0.74   06/04/2019 0.73   04/26/2019 0.76    Lab Results   Component Value Date    WBC 5.3 08/12/2019    HGB 12.2 08/12/2019    HCT 38.3 08/12/2019    MCV 89 08/12/2019     08/12/2019    Lab Results   Component Value Date     06/04/2019     BNP (pg/mL)   Date Value   06/04/2019 128   04/26/2019 84   04/23/2019 100          40 minutes were spent with the patient with greater than 50% spent on education and counseling.      Megan Wells, [facility]   Heart Failure Clinic

## 2021-06-27 NOTE — PROGRESS NOTES
Progress Notes by Reese Tom DO at 6/26/2019  3:30 PM     Author: Reese Tom DO Service: -- Author Type: Physician    Filed: 6/26/2019  7:19 PM Encounter Date: 6/26/2019 Status: Signed    : Reese Tom DO (Physician)           Click to link to [facility]     [facility] HEART CARE CONSULTATION NOTE      Assessment/Recommendations   Assessment:    1. 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    Recent device interrogation was reviewed demonstrated 38% RV paced.   2.  Acute congestive heart failure secondary to left ventricular systolic dysfunction reduced left ventricular ejection fraction.  Improving LVEF:49%.    3.  Severe Exertional dyspnea/faitgue, no clear improvement despite improving LVEF.   4.  Recent acute pulmonary embolism    Plan:  1.  PFTs and 6 minute walk testing.     2. Hold metoprolol given hypotension.    3. Off lisinopril per Dr. Babcock.   4. PRN Lasix 20 mg as needed for dyspnea  5. Warfarin anticoagulation for pulmonary embolism  6. May need to consider V/Q scan to determine severity of mismatch and repeat echo (complete) to assess for TR and pulmonary hypertension.         History of Present Illness/Subjective    Ms. Helena Brown is a 79 y.o. female with recent history of complete AV block status post dual-chamber pacemaker, recent development of acute congestive heart failure secondary to reduced left ventricular ejection fraction, recent acute pulmonary embolism who presents in cardiology clinic in follow-up.    According the patient she continues to struggle with her dyspnea on exertion.  She recently was noted to have a pericardial effusion which repeat echocardiogram demonstrated trace pericardial effusion.  Her ejection fraction continues to improve.  She underwent coronary CTA which did not demonstrate significant obstructive coronary disease.  Interrogation appears to be  relatively normal function.      I am concerned the patient notes with any prolonged activity she feels severe fatigue.  She is on beta-blocker therapy and is somewhat hypotensive today we will hold to see if this improves her symptoms.  She did have bilateral pulmonary embolism which could be resulting in chronic hypoxia.  Will obtain 6-minute walk test to determine how far she can walk and if she develops hypoxia.  Will check PFTs.  Echocardiograms have demonstrated progressive improvement in left ventricular ejection fraction of 49%.    He denies any chest pain.  She is compliant with her warfarin therapy.  No significant lower extremity edema.  No orthopnea or PND symptoms.  Recent BNP levels were relatively normal.   remains in about her overall condition and her symptoms.     Physical Examination Review of Systems   Vitals:    06/26/19 1526   BP: 96/62   Pulse: 60   Resp: 16     Body mass index is 19.8 kg/m .  Wt Readings from Last 3 Encounters:   06/26/19 110 lb (49.9 kg)   06/04/19 109 lb (49.4 kg)   06/04/19 109 lb (49.4 kg)       Intake/Output Summary (Last 24 hours) at 3/29/2019 0811  Last data filed at 3/29/2019 0600  Gross per 24 hour   Intake --   Output 600 ml   Net -600 ml     General Appearance:   no distress, normal body habitus   ENT/Mouth: membranes moist, no oral lesions or bleeding gums.      EYES:  no scleral icterus, normal conjunctivae   Neck: no carotid bruits or thyromegaly   Chest/Lungs:   lungs are clear to auscultation, no rales or wheezing, no sternal scar, equal chest wall expansion.  Device noted in the left upper chest wall.    Cardiovascular:   Regular. Normal first and second heart sounds with no murmurs, rubs, or gallops; the carotid, radial and posterior tibial pulses are intact, Jugular venous pressure normal, no edema bilaterally.      Abdomen:  no tenderness; bowel sounds are present   Extremities: no cyanosis or clubbing   Skin: no xanthelasma, warm.    Neurologic:  normal  bilateral, no tremors     Psychiatric: alert and oriented x3, calm     General: Weight Loss  Eyes: WNL  Ears/Nose/Throat: WNL  Lungs: Shortness of Breath  Heart: WNL  Stomach: WNL  Bladder: WNL  Muscle/Joints: Joint Pain  Skin: WNL  Nervous System: WNL  Mental Health: WNL     Blood: Easy Bruising       Medical History  Surgical History Family History Social History   Past Medical History:   Diagnosis Date   ? Anxiety    ? Arthritis    ? Asthma    ? Colitis     Collagenous   ? Complete heart block (H)    ? Hypertriglyceridemia    ? Macular degeneration    ? Osteopenia    ? Osteoporosis    ? Pacemaker    ? Restless legs     Past Surgical History:   Procedure Laterality Date   ? APPENDECTOMY     ? BREAST EXCISIONAL BIOPSY Right [id number]   ? BREAST SURGERY      biopsy   ? BUNIONECTOMY     ? EP PACEMAKER INSERT N/A 3/29/2019    Aaron Babcock MD;  Location: [facility];  Service: Cardiology   ? GA TOTAL KNEE ARTHROPLASTY Left 8/30/2018    Procedure:  LEFT MINIMALLY INVASIVE TOTAL KNEE ARTHROPLASTY;  Surgeon: Segun Banks MD;  Location: [facility];  Service: Orthopedics   ? GA TOTAL KNEE ARTHROPLASTY Right 11/8/2018    Procedure: RIGHT MINIMALLY INVASIVE TOTAL KNEE ARTHROPLASTY;  Surgeon: Segun Banks MD;  Location: [facility] OR;  Service: Orthopedics   ? ROTATOR CUFF REPAIR     ? TONSILLECTOMY      Family History   Problem Relation Age of Onset   ? Breast cancer Cousin    ? Heart disease Mother    ? Alzheimer's disease Father    ? Clotting disorder Neg Hx     Social History     Socioeconomic History   ? Marital status:      Spouse name: Not on file   ? Number of children: Not on file   ? Years of education: Not on file   ? Highest education level: Not on file   Occupational History   ? Not on file   Social Needs   ? Financial resource strain: Not on file   ? Food insecurity:     Worry: Not on file     Inability: Not on file   ? Transportation needs:     Medical: Not  on file     Non-medical: Not on file   Tobacco Use   ? Smoking status: Former Smoker     Last attempt to quit: 1960     Years since quittin.6   ? Smokeless tobacco: Never Used   Substance and Sexual Activity   ? Alcohol use: Yes     Alcohol/week: 4.2 oz     Types: 7 Glasses of wine per week     Comment: per week   ? Drug use: No   ? Sexual activity: Not on file   Lifestyle   ? Physical activity:     Days per week: Not on file     Minutes per session: Not on file   ? Stress: Not on file   Relationships   ? Social connections:     Talks on phone: Not on file     Gets together: Not on file     Attends Restorationism service: Not on file     Active member of club or organization: Not on file     Attends meetings of clubs or organizations: Not on file     Relationship status: Not on file   ? Intimate partner violence:     Fear of current or ex partner: Not on file     Emotionally abused: Not on file     Physically abused: Not on file     Forced sexual activity: Not on file   Other Topics Concern   ? Not on file   Social History Narrative   ? Not on file          Medications  Allergies   Scheduled Meds:    Continuous Infusions:    PRN Meds:. Allergies   Allergen Reactions   ? Nsaids (Non-Steroidal Anti-Inflammatory Drug) Other (See Comments)     Patient has history of microcolitis.  GI aggravation   ? Sulfa (Sulfonamide Antibiotics)      Eye drops caused burning         Lab Results    Chemistry/lipid CBC Cardiac Enzymes/BNP/TSH/INR   Lab Results   Component Value Date    CHOL 182 2019    HDL 53 2019    LDLCALC 113 2019    TRIG 82 2019    CREATININE 0.74 2019    BUN 20 2019    K 4.6 2019     2019     2019    CO2 25 2019    Lab Results   Component Value Date    WBC 6.5 2019    HGB 11.3 (L) 2019    HCT 35.7 2019    MCV 88 2019     2019    Lab Results   Component Value Date    TROPONINI 0.01 2019      06/04/2019    TSH 1.44 03/28/2019    INR 1.02 04/26/2019

## 2021-06-27 NOTE — PROGRESS NOTES
Progress Notes by Aaron Babcock MD at 4/23/2019  1:03 PM     Author: Aaron Babcock MD Service: -- Author Type: Physician    Filed: 4/23/2019  4:47 PM Encounter Date: 4/23/2019 Status: Signed    : Aaron Babcock MD (Physician)       Laureen Killian MD Granrud, Gregory A, MD; Reese Tom, Helena Carmen is someone you both know from a hospitalization at the end of March for complete heart block. Her EF has gone down to 30-35% with RV pacing and there are symptoms that may be pericarditis as well. EKG today A-V paced. Labs pending. EG      78-year-old lady had heart block  Dual-chamber [employer] pacemaker was inserted  Chest x-ray shows good lead position not apical echocardiogram shows ejection fraction is dropped to 30-35%.  There was a trace pericardial effusion  Comprehensive laboratory studies were normal  BNP equals 100  Ventricular pacing with high percentage  ECG shows moderate QRS prolongation equals 136 ms    Plan  Obtain   chest CTA to r/o pulmonary embolism; check lead position and pericardial process; seems unlikely to be perforation considering position of leads on CXR  May need upgrade to CRT system if non cardiac ruled out

## 2021-06-27 NOTE — PROGRESS NOTES
Progress Notes by Aaron Babcock MD at 6/4/2019 11:59 PM     Author: Aaron Babcock MD Service: Cardiology Author Type: Physician    Filed: 6/6/2019  4:56 PM Date of Service: 6/4/2019 11:59 PM Status: Signed    : Aaron Babcock MD (Physician)       Yvette Deleon RN Granrud, Gregory A, MD Dr Granrud-   I spoke to the pt and the  yesterday, regarding your recommendation for an OV with you that she has scheduled on 7/16/19 to discuss CRT upgrade   In addition to your recommendations above, you feel she would benefit from pulmonary consult and PFTs? Or cnx the OV to discuss CRT upgrade, as you feel this is now more pulmonary in nature?   Please advise   Thank you,   Kristen      Her  has CRT  They brought in an article that would suggest all patients with heart block should have a biventricular device  So they are pretty sold on the CRT-also our collegues have pretty much told her she needs an upgrade  I think she should have a pulmonary evaluation and pulmonary function studies  And allow the pericardial process to resolve-echo is better   I am concerned that she has occlusion of the left subclavian vein  Which would make upgrading her device more complicated  Schedule her for left  subclavian venogram

## 2021-06-28 NOTE — PROGRESS NOTES
Progress Notes by Reese Tom DO at 10/1/2019  1:30 PM     Author: Reese Tom DO Service: -- Author Type: Physician    Filed: 10/1/2019  1:59 PM Encounter Date: 10/1/2019 Status: Signed    : Reese Tom DO (Physician)           Click to link to [facility]     [facility] HEART CARE CONSULTATION NOTE      Assessment/Recommendations   Assessment:    1. 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    Recent device interrogation was reviewed demonstrated 99 biV pacing.  There was an increase in LV threshold after device placement in August clinic check.   2.  Chronic congestive heart failure secondary to left ventricular systolic dysfunction reduced left ventricular ejection fraction (LVEF:30-35% with RV pacing).   3.  Severe exertional dyspnea (resolved).    4.  History of pulmonary embolism    Plan:  1. Device follow in November.    2. Warfarin anticoagulation for pulmonary embolism  3. Patient is not on ACEI or beta blocker given blood pressure with medications.        History of Present Illness/Subjective    Ms. Helena Brown is a 79 y.o. female recent history of severe dyspnea on exertion, recent upgrade to biventricular pacer which resulted in near resolution of exertional dyspnea, history of nonischemic cardia myopathy presents to cardiology clinic follow-up for cardiomyopathy.    She underwent successful placement of a biventricular pacer system in August 2019 by Dr. Babcock.  Placement of the device patient has noticed complete resolution of her dyspnea on exertion.  Recent device interrogation did demonstrate an increase in threshold in her LV lead.  Currently she has no active cardiac symptoms.  Remote device interrogation was reviewed from September 19, 2019 which demonstrate normal device function.  She is 99% biventricular paced.  She currently is walking for 1 to 2 miles per day with no cardiac  symptoms.  She does have slight fatigue with prolonged activity.  She denies any chest pain symptoms.  She denies any discomfort at her chest site.     Physical Examination Review of Systems   Vitals:    10/01/19 1322   BP: 102/62   Pulse: 68   Resp: 16     Body mass index is 20.3 kg/m .  Wt Readings from Last 3 Encounters:   10/01/19 111 lb (50.3 kg)   08/19/19 109 lb (49.4 kg)   08/16/19 108 lb 8 oz (49.2 kg)       Intake/Output Summary (Last 24 hours) at 3/29/2019 0811  Last data filed at 3/29/2019 0600  Gross per 24 hour   Intake --   Output 600 ml   Net -600 ml     General Appearance:   no distress, normal body habitus   ENT/Mouth: membranes moist, no oral lesions or bleeding gums.      EYES:  no scleral icterus, normal conjunctivae   Neck: no carotid bruits or thyromegaly   Chest/Lungs:   lungs are clear to auscultation, no rales or wheezing, no sternal scar, equal chest wall expansion.  Device noted in the left upper chest wall (stable).    Cardiovascular:   Regular. Normal first and second heart sounds with no murmurs, rubs, or gallops; the carotid, radial and posterior tibial pulses are intact, Jugular venous pressure normal, no edema bilaterally.      Abdomen:  no tenderness; bowel sounds are present   Extremities: no cyanosis or clubbing   Skin: no xanthelasma, warm.    Neurologic: normal  bilateral, no tremors     Psychiatric: alert and oriented x3, calm     General: WNL  Eyes: WNL  Ears/Nose/Throat: WNL  Lungs: WNL  Heart: WNL  Stomach: WNL  Bladder: WNL  Muscle/Joints: WNL  Skin: WNL  Nervous System: WNL  Mental Health: WNL     Blood: WNL       Medical History  Surgical History Family History Social History   Past Medical History:   Diagnosis Date   ? Anxiety    ? Arthritis    ? Asthma    ? Colitis     Collagenous   ? Complete heart block (H)    ? Hypertriglyceridemia    ? Macular degeneration    ? Osteopenia    ? Osteoporosis    ? Pacemaker    ? Restless legs    ? Skin cancer, basal cell     Past  Surgical History:   Procedure Laterality Date   ? APPENDECTOMY     ? BREAST EXCISIONAL BIOPSY Right [id number]   ? BREAST SURGERY      biopsy   ? BUNIONECTOMY     ? EP PACEMAKER INSERT N/A 3/29/2019    Aaron Babcock MD;  Location: [facility];  Service: Cardiology   ? IR EXTREMITY VENOGRAM LEFT  2019   ? AR TOTAL KNEE ARTHROPLASTY Left 2018    Procedure:  LEFT MINIMALLY INVASIVE TOTAL KNEE ARTHROPLASTY;  Surgeon: Segun aBnks MD;  Location: [facility];  Service: Orthopedics   ? AR TOTAL KNEE ARTHROPLASTY Right 2018    Procedure: RIGHT MINIMALLY INVASIVE TOTAL KNEE ARTHROPLASTY;  Surgeon: Segun Banks MD;  Location: [facility];  Service: Orthopedics   ? ROTATOR CUFF REPAIR     ? TONSILLECTOMY      Family History   Problem Relation Age of Onset   ? Breast cancer Cousin    ? Heart disease Mother    ? Alzheimer's disease Father    ? Clotting disorder Neg Hx     Social History     Socioeconomic History   ? Marital status:      Spouse name: Not on file   ? Number of children: Not on file   ? Years of education: Not on file   ? Highest education level: Not on file   Occupational History   ? Not on file   Social Needs   ? Financial resource strain: Not on file   ? Food insecurity:     Worry: Not on file     Inability: Not on file   ? Transportation needs:     Medical: Not on file     Non-medical: Not on file   Tobacco Use   ? Smoking status: Former Smoker     Packs/day: 0.50     Years: 2.00     Pack years: 1.00     Last attempt to quit: 1960     Years since quittin.9   ? Smokeless tobacco: Never Used   Substance and Sexual Activity   ? Alcohol use: Yes     Alcohol/week: 7.0 standard drinks     Types: 7 Glasses of wine per week     Comment: per week   ? Drug use: No   ? Sexual activity: Not on file   Lifestyle   ? Physical activity:     Days per week: Not on file     Minutes per session: Not on file   ? Stress: Not on file   Relationships   ? Social  connections:     Talks on phone: Not on file     Gets together: Not on file     Attends Confucianist service: Not on file     Active member of club or organization: Not on file     Attends meetings of clubs or organizations: Not on file     Relationship status: Not on file   ? Intimate partner violence:     Fear of current or ex partner: Not on file     Emotionally abused: Not on file     Physically abused: Not on file     Forced sexual activity: Not on file   Other Topics Concern   ? Not on file   Social History Narrative   ? Not on file          Medications  Allergies   Scheduled Meds:    Continuous Infusions:    PRN Meds:. Allergies   Allergen Reactions   ? Nsaids (Non-Steroidal Anti-Inflammatory Drug) Other (See Comments)     Patient has history of microcolitis.  GI aggravation   ? Sulfa (Sulfonamide Antibiotics)      Eye drops caused burning         Lab Results    Chemistry/lipid CBC Cardiac Enzymes/BNP/TSH/INR   Lab Results   Component Value Date    CHOL 182 06/06/2019    HDL 53 06/06/2019    LDLCALC 113 06/06/2019    TRIG 82 06/06/2019    CREATININE 0.84 08/12/2019    BUN 20 08/12/2019    K 3.9 08/12/2019     08/12/2019     08/12/2019    CO2 31 08/12/2019    Lab Results   Component Value Date    WBC 5.3 08/12/2019    HGB 12.2 08/12/2019    HCT 38.3 08/12/2019    MCV 89 08/12/2019     08/12/2019    Lab Results   Component Value Date    TROPONINI 0.01 04/26/2019     06/04/2019    TSH 1.44 03/28/2019    INR 2.20 (!) 08/16/2019

## 2021-06-29 NOTE — PROGRESS NOTES
"Progress Notes by Aaron Babcock MD at 5/22/2020 12:50 PM     Author: Aaron Babcock MD Service: -- Author Type: Physician    Filed: 5/22/2020  1:18 PM Encounter Date: 5/22/2020 Status: Signed    : Aaron Babcock MD (Physician)           The patient has been notified of following:     \"This telephone visit will be conducted via a call between you and your physician/provider. We have found that certain health care needs can be provided without the need for a physical exam.  This service lets us provide the care you need with a phone conversation.  If a prescription is necessary we can send it directly to your pharmacy.  If lab work is needed we can place an order for that and you can then stop by our lab to have the test done at a later time. If during the course of the call the physician/provider feels a telephone visit is not appropriate, you will not be charged for this service.\" Verbal consent has been obtained for this service by care team member:         HEART CARE PHONE ENCOUNTER        The patient has chosen to have the visit conducted as a telephone visit, to reduce risk of exposure given the current status of Coronavirus in our community. This telephone visit is being conducted via a call between the patient and physician/provider. Health care needs are being provided without a physical exam.     Assessment/Recommendations   Assessment:      Complete  heart block with profound bradycardia-symptomatic  Dual-chamber [employer] pacemaker implanted March 29, 2019   dyssynchrony from left bundle branch block or from pacemaker although ventricular pacing is at 33%  Pacemaker upgrade to CRT-P; August 12, 2019. 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-lots of collaterals around the left subclavian region  Leg ultrasound normal  warfarin stopped   No history of myocardial infarction; coronary CT angiogram May 7, 2019 showed moderate " coronary artery stenosis with RCA 50-69%  Pulmonary function studies normal including normal diffusion capacity    Plan:  Pacemaker evaluation is excellent  Battery should  last 11 years  22% atrial pacing  100% biventricular resynchronization pacing  No arrhythmias, no ventricular tachycardia identified  Reviewed your medicines which are simple just prescription for Neuratin  Continue with a good exercise program  Follow-up in 3 months; obtain echocardiogram prior to that visit  We will compare the ejection fraction to the echocardiogram-44% prior to the resynchronization therapy        Follow Up Plan: Follow up in   I have reviewed the note as documented.  This accurately captures the substance of my conversation with the patient.    Total time of call between patient and provider was 23 minutes   Start Time:1250  Stop Time:102  Additional 20 minutes to review medical records and generate report      History of Present Illness/Subjective    Helena Brown is a 79 y.o. female who is being evaluated via a billable telephone visit.      She has been feeling well  Continues to have some fatigue and some dyspnea on exertion but does all her day-to-day activities pretty well  Feels better than she had before her cardiac resynchronization device  No awareness of palpitations or arrhythmias  Pacemaker interrogation showed device to last 11 years  22% atrial pacing  100% ventricular pacing  No arrhythmias no ventricular tachycardia  Last episode of nonsustained ventricular tachycardia was December 26, 2019  I did review previous chest x-ray that showed excellent position of the LV lead and the EKG showed quite a narrowing pattern all good signs for good resynchronization  No chest pains  Does have a bit of achiness in her left chest sometimes at night that goes away with position this is not twitching and does not appear to be phrenic nerve  No orthopnea PND or pedal edema  Unaware of palpitations no syncopal or near  syncopal episodes  Reviewed medicines  On no anticoagulation only takes Neurontin as a prescription medicine  She wonders if her ejection fraction has improved.  Before resynchronization her ejection fraction was 44%    I have reviewed and updated the patient's Past Medical History, Social History, Family History and Medication List.     Physical Examination not performed given phone encounter Review of Systems                                                Medical History  Surgical History Family History Social History   Past Medical History:   Diagnosis Date   ? Anxiety    ? Arthritis    ? Asthma    ? Colitis     Collagenous   ? Complete heart block (H)    ? Hypertriglyceridemia    ? Macular degeneration    ? Osteopenia    ? Osteoporosis    ? Pacemaker    ? Pulmonary embolism (H)    ? Restless legs    ? Skin cancer, basal cell     Past Surgical History:   Procedure Laterality Date   ? APPENDECTOMY     ? BREAST EXCISIONAL BIOPSY Right [id number]   ? BREAST SURGERY      biopsy   ? BUNIONECTOMY     ? EP PACEMAKER INSERT N/A 3/29/2019    Aaron Babcock MD;  Location: [facility];  Service: Cardiology   ? IR EXTREMITY VENOGRAM LEFT  7/25/2019   ? NH TOTAL KNEE ARTHROPLASTY Left 8/30/2018    Procedure:  LEFT MINIMALLY INVASIVE TOTAL KNEE ARTHROPLASTY;  Surgeon: Segun Banks MD;  Location: [facility];  Service: Orthopedics   ? NH TOTAL KNEE ARTHROPLASTY Right 11/8/2018    Procedure: RIGHT MINIMALLY INVASIVE TOTAL KNEE ARTHROPLASTY;  Surgeon: Segun Banks MD;  Location: [facility] OR;  Service: Orthopedics   ? ROTATOR CUFF REPAIR     ? TONSILLECTOMY      Family History   Problem Relation Age of Onset   ? Breast cancer Cousin    ? Heart disease Mother    ? Alzheimer's disease Father    ? Clotting disorder Neg Hx     Social History     Socioeconomic History   ? Marital status:      Spouse name: Not on file   ? Number of children: Not on file   ? Years of education: Not on file   ?  Highest education level: Not on file   Occupational History   ? Not on file   Social Needs   ? Financial resource strain: Not on file   ? Food insecurity     Worry: Not on file     Inability: Not on file   ? Transportation needs     Medical: Not on file     Non-medical: Not on file   Tobacco Use   ? Smoking status: Former Smoker     Packs/day: 0.50     Years: 2.00     Pack years: 1.00     Last attempt to quit: 1960     Years since quittin.5   ? Smokeless tobacco: Never Used   Substance and Sexual Activity   ? Alcohol use: Yes     Alcohol/week: 7.0 standard drinks     Types: 7 Glasses of wine per week     Comment: per week   ? Drug use: No   ? Sexual activity: Not on file   Lifestyle   ? Physical activity     Days per week: Not on file     Minutes per session: Not on file   ? Stress: Not on file   Relationships   ? Social connections     Talks on phone: Not on file     Gets together: Not on file     Attends Mor[employer] service: Not on file     Active member of club or organization: Not on file     Attends meetings of clubs or organizations: Not on file     Relationship status: Not on file   ? Intimate partner violence     Fear of current or ex partner: Not on file     Emotionally abused: Not on file     Physically abused: Not on file     Forced sexual activity: Not on file   Other Topics Concern   ? Not on file   Social History Narrative   ? Not on file          Medications  Allergies   Current Outpatient Medications   Medication Sig Dispense Refill   ? acetaminophen (TYLENOL) 500 MG tablet Take 1,000 mg by mouth as needed for pain.      ? CALCIUM CITRATE ORAL Take 600 mg by mouth 2 (two) times a day.      ? cholecalciferol, vitamin D3, (VITAMIN D3) 1,000 unit capsule Take 1,000 Units by mouth daily.     ? cyanocobalamin 1000 MCG tablet Take 500 mcg by mouth daily.     ? gabapentin (NEURONTIN) 300 MG capsule Take 300-600 mg by mouth at bedtime.            ? magnesium oxide 250 mg Tab Take 250 mg by mouth at  bedtime.      ? bismuth subsalicylate (BISMUTH SUBSALICYLATE) 262 mg Chew chew tab Chew 3 tablets 3 (three) times a day.      ? KRILL OIL ORAL Take 1 capsule by mouth every other day. DHA 200mg every other day             No current facility-administered medications for this visit.     Allergies   Allergen Reactions   ? Nsaids (Non-Steroidal Anti-Inflammatory Drug) Other (See Comments)     Patient has history of microcolitis.  GI aggravation   ? Sulfa (Sulfonamide Antibiotics)      Eye drops caused burning         Lab Results    Chemistry/lipid CBC Cardiac Enzymes/BNP/TSH/INR   Lab Results   Component Value Date    CHOL 182 06/06/2019    HDL 53 06/06/2019    LDLCALC 113 06/06/2019    TRIG 82 06/06/2019    CREATININE 0.84 08/12/2019    BUN 20 08/12/2019    K 3.9 08/12/2019     08/12/2019     08/12/2019    CO2 31 08/12/2019    Lab Results   Component Value Date    WBC 5.3 08/12/2019    HGB 12.2 08/12/2019    HCT 38.3 08/12/2019    MCV 89 08/12/2019     08/12/2019    Lab Results   Component Value Date    TROPONINI 0.01 04/26/2019     06/04/2019    TSH 1.44 03/28/2019    INR 2.20 (!) 08/16/2019        Aaron Babcock

## 2021-07-03 NOTE — ADDENDUM NOTE
Addendum Note by Aaron Steinberg MD at 7/16/2019  7:50 AM     Author: Aaron Steinberg MD Service: -- Author Type: Physician    Filed: 7/16/2019  9:19 AM Encounter Date: 7/16/2019 Status: Signed    : Aaron Steinberg MD (Physician)    Addended by: AARON STEINBERG on: 7/16/2019 09:19 AM        Modules accepted: Orders

## 2021-07-03 NOTE — ADDENDUM NOTE
Addendum Note by Fifi Ravi RN at 5/29/2019 11:48 AM     Author: Fifi Ravi RN Service: -- Author Type: Registered Nurse    Filed: 5/29/2019 11:48 AM Encounter Date: 5/20/2019 Status: Signed    : Fifi Ravi RN (Registered Nurse)    Addended by: FIFI RAVI on: 5/29/2019 11:48 AM        Modules accepted: Orders

## 2021-07-03 NOTE — ADDENDUM NOTE
Addendum Note by Dixie Arias RN at 4/23/2019  1:03 PM     Author: Dixie Arias RN Service: -- Author Type: Registered Nurse    Filed: 4/24/2019  2:04 PM Encounter Date: 4/23/2019 Status: Signed    : Dixie Arias RN (Registered Nurse)    Addended by: DIXIE ARIAS on: 4/24/2019 02:04 PM        Modules accepted: Orders

## 2021-07-04 NOTE — LETTER
Letter by Hailey Craig at      Author: Hailey Craig Service: -- Author Type: --    Filed:  Encounter Date: 6/3/2021 Status: (Other)         Helena SCHMITT Stephanie  [address] [address]      Genie 3, 2021      Dear Ms. Brown,    RE: Remote Results    We are writing to you regarding your recent Remote Pacemaker check from home. Your transmission was received successfully. Battery status is satisfactory at this time.     Your results are within normal limits.    Your next device appointment will be a clinic visit.  Please call in June to schedule.      To schedule or reschedule, please call [phone] and press 1.    NOTE: If you would like to do an extra transmission, please call [phone] and press 3 to speak to a nurse BEFORE transmitting. This ensures that the Device Clinic staff is aware of the reason you are sending a transmission, and can follow-up with you after it has been reviewed.    We will be checking your implanted device from home (remotely) every three months unless otherwise instructed. We will need to see you in the clinic at least once a year. You may need to be seen in the clinic sooner depending on the results of your check.    Please be aware:    The follow-up schedule is like a Physician prescription.    Your remote monitor is paired to your specific implanted device.      Sincerely,    [facility]

## 2021-07-04 NOTE — ADDENDUM NOTE
Addendum Note by Kyra Velazquez RN at 5/7/2021 11:49 AM     Author: Kyra Velazquez RN Service: -- Author Type: Registered Nurse    Filed: 5/7/2021  1:15 PM Encounter Date: 5/7/2021 Status: Signed    : Kyra Velazquez RN (Registered Nurse)    Addended by: KYRA VELAZQUEZ on: 5/7/2021 01:15 PM        Modules accepted: Orders

## 2021-07-13 ENCOUNTER — RECORDS - HEALTHEAST (OUTPATIENT)
Dept: ADMINISTRATIVE | Facility: CLINIC | Age: 81
End: 2021-07-13

## 2021-07-15 ENCOUNTER — LAB REQUISITION (OUTPATIENT)
Dept: LAB | Facility: CLINIC | Age: 81
End: 2021-07-15
Payer: MEDICARE

## 2021-07-15 DIAGNOSIS — E78.1 PURE HYPERGLYCERIDEMIA: ICD-10-CM

## 2021-07-15 LAB
ALBUMIN SERPL-MCNC: 3.6 G/DL (ref 3.5–5)
ALP SERPL-CCNC: 76 U/L (ref 45–120)
ALT SERPL W P-5'-P-CCNC: 32 U/L (ref 0–45)
ANION GAP SERPL CALCULATED.3IONS-SCNC: 14 MMOL/L (ref 5–18)
AST SERPL W P-5'-P-CCNC: 33 U/L (ref 0–40)
BILIRUB SERPL-MCNC: 0.7 MG/DL (ref 0–1)
BUN SERPL-MCNC: 21 MG/DL (ref 8–28)
CALCIUM SERPL-MCNC: 9.3 MG/DL (ref 8.5–10.5)
CHLORIDE BLD-SCNC: 107 MMOL/L (ref 98–107)
CHOLEST SERPL-MCNC: 151 MG/DL
CO2 SERPL-SCNC: 19 MMOL/L (ref 22–31)
CREAT SERPL-MCNC: 0.82 MG/DL (ref 0.6–1.1)
FASTING STATUS PATIENT QL REPORTED: NORMAL
GFR SERPL CREATININE-BSD FRML MDRD: 67 ML/MIN/1.73M2
GLUCOSE BLD-MCNC: 77 MG/DL (ref 70–125)
HDLC SERPL-MCNC: 68 MG/DL
LDLC SERPL CALC-MCNC: 70 MG/DL
POTASSIUM BLD-SCNC: 4.7 MMOL/L (ref 3.5–5)
PROT SERPL-MCNC: 6.8 G/DL (ref 6–8)
SODIUM SERPL-SCNC: 140 MMOL/L (ref 136–145)
TRIGL SERPL-MCNC: 66 MG/DL
VIT B12 SERPL-MCNC: 466 PG/ML (ref 213–816)

## 2021-07-15 PROCEDURE — 82040 ASSAY OF SERUM ALBUMIN: CPT | Performed by: FAMILY MEDICINE

## 2021-07-15 PROCEDURE — 80061 LIPID PANEL: CPT | Mod: ORL | Performed by: FAMILY MEDICINE

## 2021-07-15 PROCEDURE — 82247 BILIRUBIN TOTAL: CPT | Performed by: FAMILY MEDICINE

## 2021-07-15 PROCEDURE — 82306 VITAMIN D 25 HYDROXY: CPT | Performed by: FAMILY MEDICINE

## 2021-07-15 PROCEDURE — 82607 VITAMIN B-12: CPT | Performed by: FAMILY MEDICINE

## 2021-07-16 LAB — DEPRECATED CALCIDIOL+CALCIFEROL SERPL-MC: 53 UG/L (ref 30–80)

## 2021-07-21 ENCOUNTER — RECORDS - HEALTHEAST (OUTPATIENT)
Dept: ADMINISTRATIVE | Facility: CLINIC | Age: 81
End: 2021-07-21

## 2021-08-17 ENCOUNTER — HOSPITAL ENCOUNTER (OUTPATIENT)
Dept: CARDIOLOGY | Facility: CLINIC | Age: 81
Discharge: HOME | End: 2021-08-17
Attending: INTERNAL MEDICINE | Admitting: INTERNAL MEDICINE
Payer: MEDICARE

## 2021-08-17 DIAGNOSIS — I50.21 ACUTE HFREF (HEART FAILURE WITH REDUCED EJECTION FRACTION) (H): ICD-10-CM

## 2021-08-17 DIAGNOSIS — I45.9 HEART BLOCK: ICD-10-CM

## 2021-08-17 DIAGNOSIS — I44.2 COMPLETE AV BLOCK (H): ICD-10-CM

## 2021-08-17 DIAGNOSIS — Z95.0 CARDIAC PACEMAKER IN SITU: ICD-10-CM

## 2021-08-17 PROCEDURE — 93306 TTE W/DOPPLER COMPLETE: CPT

## 2021-08-17 PROCEDURE — 93306 TTE W/DOPPLER COMPLETE: CPT | Mod: 26 | Performed by: INTERNAL MEDICINE

## 2021-08-30 ENCOUNTER — ANCILLARY PROCEDURE (OUTPATIENT)
Dept: CARDIOLOGY | Facility: CLINIC | Age: 81
End: 2021-08-30
Attending: INTERNAL MEDICINE
Payer: MEDICARE

## 2021-08-30 ENCOUNTER — OFFICE VISIT (OUTPATIENT)
Dept: CARDIOLOGY | Facility: CLINIC | Age: 81
End: 2021-08-30
Payer: MEDICARE

## 2021-08-30 VITALS
DIASTOLIC BLOOD PRESSURE: 60 MMHG | SYSTOLIC BLOOD PRESSURE: 108 MMHG | HEART RATE: 66 BPM | WEIGHT: 105.3 LBS | BODY MASS INDEX: 19.26 KG/M2 | RESPIRATION RATE: 16 BRPM

## 2021-08-30 DIAGNOSIS — Z95.0 PRESENCE OF BIVENTRICULAR CARDIAC PACEMAKER: ICD-10-CM

## 2021-08-30 DIAGNOSIS — I44.2 CHB (COMPLETE HEART BLOCK) (H): Primary | ICD-10-CM

## 2021-08-30 DIAGNOSIS — I26.93 SINGLE SUBSEGMENTAL PULMONARY EMBOLISM WITHOUT ACUTE COR PULMONALE (H): ICD-10-CM

## 2021-08-30 DIAGNOSIS — I87.2 VENOUS (PERIPHERAL) INSUFFICIENCY: ICD-10-CM

## 2021-08-30 DIAGNOSIS — R06.09 DOE (DYSPNEA ON EXERTION): ICD-10-CM

## 2021-08-30 DIAGNOSIS — Z95.0 CARDIAC PACEMAKER IN SITU: ICD-10-CM

## 2021-08-30 LAB
MDC_IDC_LEAD_IMPLANT_DT: NORMAL
MDC_IDC_LEAD_LOCATION: NORMAL
MDC_IDC_LEAD_LOCATION_DETAIL_1: NORMAL
MDC_IDC_LEAD_MFG: NORMAL
MDC_IDC_LEAD_MODEL: NORMAL
MDC_IDC_LEAD_POLARITY_TYPE: NORMAL
MDC_IDC_LEAD_SERIAL: NORMAL
MDC_IDC_LEAD_SPECIAL_FUNCTION: NORMAL
MDC_IDC_MSMT_BATTERY_DTM: NORMAL
MDC_IDC_MSMT_BATTERY_REMAINING_LONGEVITY: 102 MO
MDC_IDC_MSMT_BATTERY_REMAINING_PERCENTAGE: 100 %
MDC_IDC_MSMT_BATTERY_STATUS: NORMAL
MDC_IDC_MSMT_LEADCHNL_LV_IMPEDANCE_VALUE: 855 OHM
MDC_IDC_MSMT_LEADCHNL_LV_PACING_THRESHOLD_AMPLITUDE: 1.7 V
MDC_IDC_MSMT_LEADCHNL_LV_PACING_THRESHOLD_PULSEWIDTH: 0.9 MS
MDC_IDC_MSMT_LEADCHNL_RA_IMPEDANCE_VALUE: 689 OHM
MDC_IDC_MSMT_LEADCHNL_RA_PACING_THRESHOLD_AMPLITUDE: 0.6 V
MDC_IDC_MSMT_LEADCHNL_RA_PACING_THRESHOLD_AMPLITUDE: 0.8 V
MDC_IDC_MSMT_LEADCHNL_RA_PACING_THRESHOLD_PULSEWIDTH: 0.4 MS
MDC_IDC_MSMT_LEADCHNL_RA_PACING_THRESHOLD_PULSEWIDTH: 0.4 MS
MDC_IDC_MSMT_LEADCHNL_RV_IMPEDANCE_VALUE: 752 OHM
MDC_IDC_MSMT_LEADCHNL_RV_PACING_THRESHOLD_AMPLITUDE: 0.6 V
MDC_IDC_MSMT_LEADCHNL_RV_PACING_THRESHOLD_AMPLITUDE: 0.7 V
MDC_IDC_MSMT_LEADCHNL_RV_PACING_THRESHOLD_PULSEWIDTH: 0.4 MS
MDC_IDC_MSMT_LEADCHNL_RV_PACING_THRESHOLD_PULSEWIDTH: 0.4 MS
MDC_IDC_PG_IMPLANT_DTM: NORMAL
MDC_IDC_PG_MFG: NORMAL
MDC_IDC_PG_MODEL: NORMAL
MDC_IDC_PG_SERIAL: NORMAL
MDC_IDC_PG_TYPE: NORMAL
MDC_IDC_SESS_CLINIC_NAME: NORMAL
MDC_IDC_SESS_DTM: NORMAL
MDC_IDC_SESS_TYPE: NORMAL
MDC_IDC_SET_BRADY_AT_MODE_SWITCH_MODE: NORMAL
MDC_IDC_SET_BRADY_AT_MODE_SWITCH_RATE: 170 {BEATS}/MIN
MDC_IDC_SET_BRADY_LOWRATE: 60 {BEATS}/MIN
MDC_IDC_SET_BRADY_MAX_SENSOR_RATE: 130 {BEATS}/MIN
MDC_IDC_SET_BRADY_MAX_TRACKING_RATE: 130 {BEATS}/MIN
MDC_IDC_SET_BRADY_MODE: NORMAL
MDC_IDC_SET_BRADY_PAV_DELAY_HIGH: 110 MS
MDC_IDC_SET_BRADY_PAV_DELAY_LOW: 130 MS
MDC_IDC_SET_BRADY_SAV_DELAY_HIGH: 85 MS
MDC_IDC_SET_BRADY_SAV_DELAY_LOW: 100 MS
MDC_IDC_SET_CRT_LVRV_DELAY: 0 MS
MDC_IDC_SET_CRT_PACED_CHAMBERS: NORMAL
MDC_IDC_SET_LEADCHNL_LV_PACING_AMPLITUDE: 2.8 V
MDC_IDC_SET_LEADCHNL_LV_PACING_ANODE_ELECTRODE_1: NORMAL
MDC_IDC_SET_LEADCHNL_LV_PACING_ANODE_LOCATION_1: NORMAL
MDC_IDC_SET_LEADCHNL_LV_PACING_CATHODE_ELECTRODE_1: NORMAL
MDC_IDC_SET_LEADCHNL_LV_PACING_CATHODE_LOCATION_1: NORMAL
MDC_IDC_SET_LEADCHNL_LV_PACING_PULSEWIDTH: 0.9 MS
MDC_IDC_SET_LEADCHNL_LV_SENSING_ADAPTATION_MODE: NORMAL
MDC_IDC_SET_LEADCHNL_LV_SENSING_ANODE_ELECTRODE_1: NORMAL
MDC_IDC_SET_LEADCHNL_LV_SENSING_ANODE_LOCATION_1: NORMAL
MDC_IDC_SET_LEADCHNL_LV_SENSING_CATHODE_ELECTRODE_1: NORMAL
MDC_IDC_SET_LEADCHNL_LV_SENSING_CATHODE_LOCATION_1: NORMAL
MDC_IDC_SET_LEADCHNL_LV_SENSING_SENSITIVITY: 1.5 MV
MDC_IDC_SET_LEADCHNL_RA_PACING_AMPLITUDE: 1.5 V
MDC_IDC_SET_LEADCHNL_RA_PACING_CAPTURE_MODE: NORMAL
MDC_IDC_SET_LEADCHNL_RA_PACING_POLARITY: NORMAL
MDC_IDC_SET_LEADCHNL_RA_PACING_PULSEWIDTH: 0.4 MS
MDC_IDC_SET_LEADCHNL_RA_SENSING_ADAPTATION_MODE: NORMAL
MDC_IDC_SET_LEADCHNL_RA_SENSING_POLARITY: NORMAL
MDC_IDC_SET_LEADCHNL_RA_SENSING_SENSITIVITY: 0.25 MV
MDC_IDC_SET_LEADCHNL_RV_PACING_AMPLITUDE: 2 V
MDC_IDC_SET_LEADCHNL_RV_PACING_CAPTURE_MODE: NORMAL
MDC_IDC_SET_LEADCHNL_RV_PACING_POLARITY: NORMAL
MDC_IDC_SET_LEADCHNL_RV_PACING_PULSEWIDTH: 0.4 MS
MDC_IDC_SET_LEADCHNL_RV_SENSING_ADAPTATION_MODE: NORMAL
MDC_IDC_SET_LEADCHNL_RV_SENSING_POLARITY: NORMAL
MDC_IDC_SET_LEADCHNL_RV_SENSING_SENSITIVITY: 1.5 MV
MDC_IDC_SET_ZONE_DETECTION_INTERVAL: 375 MS
MDC_IDC_SET_ZONE_TYPE: NORMAL
MDC_IDC_SET_ZONE_VENDOR_TYPE: NORMAL
MDC_IDC_STAT_AT_BURDEN_PERCENT: 0 %
MDC_IDC_STAT_AT_DTM_END: NORMAL
MDC_IDC_STAT_AT_DTM_START: NORMAL
MDC_IDC_STAT_AT_MODE_SW_COUNT: 0
MDC_IDC_STAT_BRADY_DTM_END: NORMAL
MDC_IDC_STAT_BRADY_DTM_START: NORMAL
MDC_IDC_STAT_BRADY_RA_PERCENT_PACED: 31 %
MDC_IDC_STAT_BRADY_RV_PERCENT_PACED: 100 %
MDC_IDC_STAT_CRT_DTM_END: NORMAL
MDC_IDC_STAT_CRT_DTM_START: NORMAL
MDC_IDC_STAT_CRT_LV_PERCENT_PACED: 100 %
MDC_IDC_STAT_CRT_PERCENT_PACED: 100 %
MDC_IDC_STAT_EPISODE_RECENT_COUNT: 0
MDC_IDC_STAT_EPISODE_RECENT_COUNT: 1
MDC_IDC_STAT_EPISODE_RECENT_COUNT_DTM_END: NORMAL
MDC_IDC_STAT_EPISODE_RECENT_COUNT_DTM_START: NORMAL
MDC_IDC_STAT_EPISODE_TYPE: NORMAL
MDC_IDC_STAT_EPISODE_VENDOR_TYPE: NORMAL

## 2021-08-30 PROCEDURE — 99214 OFFICE O/P EST MOD 30 MIN: CPT | Performed by: INTERNAL MEDICINE

## 2021-08-30 PROCEDURE — 93281 PM DEVICE PROGR EVAL MULTI: CPT | Performed by: INTERNAL MEDICINE

## 2021-08-30 RX ORDER — SPIRONOLACTONE 25 MG/1
25 TABLET ORAL DAILY
COMMUNITY
Start: 2021-05-27

## 2021-08-30 RX ORDER — ATORVASTATIN CALCIUM 20 MG/1
20 TABLET, FILM COATED ORAL AT BEDTIME
COMMUNITY
Start: 2021-05-27

## 2021-08-30 NOTE — PATIENT INSTRUCTIONS
Helena Brown    Thank you for coming to the [facility] today for a cardiac evaluation  It was my pleasure to see you today  A good contact for any questions would be Yvette Deleon  RN @ [phone]    You have dyspnea on exertion although your exercise capacity is quite good  Oxygen saturations were good at rest and after exercise  Echocardiogram August 17, 2021 showed excellent heart function, ejection fraction was 60%-normal greater than 50%  Device interrogation shows excellent functionality no significant Abbott normal rhythm seen-100% biventricular pacing-you are pacemaker dependent  Battery good for another 8.5 years  It is not apparent why you have dyspnea on exertion-although your exercise endurance is pretty good  You did have a history of pulmonary embolism 2019 and are no longer taking anticoagulation  A chest CT angiogram will be scheduled to look for recurrence of pulmonary embolism  And leg ultrasound will be done to look for deep vein thrombosis

## 2021-08-30 NOTE — PROGRESS NOTES
[facility] Care Note  :  Assessment:  Complete  heart block with profound bradycardia-symptomatic  Dual-chamber [employer] pacemaker implanted March 29, 2019   dyssynchrony from left bundle branch block or from pacemaker although ventricular pacing is at 33%  Pacemaker upgrade to CRT-P; August 12, 2019. 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-lots of collaterals around the left subclavian region 2019  Leg ultrasound normal  warfarin stopped   No history of myocardial infarction; coronary CT angiogram May 7, 2019 showed moderate coronary artery stenosis with RCA 50-69%  Pulmonary function studies normal including normal diffusion capacity      Plan:  You have dyspnea on exertion although your exercise capacity is quite good  Oxygen saturations were good at rest and after exercise  Echocardiogram August 17, 2021 showed excellent heart function, ejection fraction was 60%-normal greater than 50%  Device interrogation shows excellent functionality no significant Abbott normal rhythm seen-100% biventricular pacing-you are pacemaker dependent  Battery good for another 8.5 years  It is not apparent why you have dyspnea on exertion-although your exercise endurance is pretty good  You did have a history of pulmonary embolism 2019 and are no longer taking anticoagulation  A chest CT angiogram will be scheduled to look for recurrence of pulmonary embolism  And leg ultrasound will be done to look for deep vein thrombosis          Subjective:    I had the opportunity to see.Helena Freddiemegan Brown , who is a 81 year old female with a known history of complete heart block  She complains of dyspnea on exertion  She can walk in the mall about a mile and a half but seems to have to pace herself if she goes too fast she becomes breathless  She has no orthopnea PND  Does have a history of chronic venous stasis and has been on spironolactone  No history of COPD and previously  pulmonary function studies were normal  She did have evidence for pulmonary embolism 2019 small subsegmental emboli there was no deep vein thrombosis she was on warfarin for a while but this was stopped  No chest pain  Venous stasis changes to her legs but no swelling  No calf tenderness  She just is not satisfied with her endurance and exercise capacity  Also complains of some imbalance    Echocardiogram showed ejection fraction 60%  Previous EKG showed excellent narrowing-resynchronization  Laboratory evaluation July 15, 2021-normal CMP  Cholesterol 151 with LDL 70    [place] 19 she had subsegmental pulmonary embolism with normal leg studies and was on warfarin possible thromboembolism from her subclavian vein question  History of chronic venous stasis and has been on spironolactone  Diuretic      Saturations 99%  After a brisk walk saturations 98% heart rate 90            Problem List:  Patient Active Problem List   Diagnosis     Breast Palpatation Diffuse Fiberous Tissue Bilateral     Classic Macular Degeneration     Eczema     Menopause     Mild Intermittent asthma     Osteoarthrosis of knee     Restless leg syndrome     Tinnitus     Osteoporosis     Plantar warts     Medical History:  Past Medical History:   Diagnosis Date     Anxiety      Arthritis      Asthma      Colitis     Collagenous     Complete heart block (H)      Hypertriglyceridemia      Macular degeneration      Osteopenia      Osteoporosis      Pacemaker      Pulmonary embolism (H)      Restless legs      Skin cancer, basal cell      Surgical History:  Past Surgical History:   Procedure Laterality Date     APPENDECTOMY       ARTHROSCOPY SHOULDER ROTATOR CUFF REPAIR       BREAST EXCISIONAL BIOPSY Right 8300-3702     BREAST SURGERY      biopsy     BUNIONECTOMY       C TOTAL KNEE ARTHROPLASTY Left 8/30/2018    Procedure:  LEFT MINIMALLY INVASIVE TOTAL KNEE ARTHROPLASTY;  Surgeon: Segun Banks MD;  Location: [facility];  Service:  Orthopedics     C TOTAL KNEE ARTHROPLASTY Right 2018    Procedure: RIGHT MINIMALLY INVASIVE TOTAL KNEE ARTHROPLASTY;  Surgeon: Segun Banks MD;  Location: [facility] OR;  Service: Orthopedics     EP PACEMAKER INSERT N/A 3/29/2019    Aaron Bacbock MD;  Location: [facility] Cath Lab;  Service: Cardiology     IR EXTREMITY VENOGRAM LEFT  2019     IR EXTREMITY VENOGRAM LEFT  2019     TONSILLECTOMY       Social History:  Social History     Socioeconomic History     Marital status:      Spouse name: Not on file     Number of children: Not on file     Years of education: Not on file     Highest education level: Not on file   Occupational History     Not on file   Tobacco Use     Smoking status: Former Smoker     Packs/day: 0.50     Years: 2.00     Pack years: 1.00     Quit date: 1960     Years since quittin.8     Smokeless tobacco: Never Used     Tobacco comment: hasn't smoke in years   Substance and Sexual Activity     Alcohol use: Yes     Alcohol/week: 7.0 standard drinks     Comment: Alcoholic Drinks/day: per week     Drug use: No     Sexual activity: Not on file   Other Topics Concern     Not on file   Social History Narrative     Not on file     Social Determinants of Health     Financial Resource Strain:      Difficulty of Paying Living Expenses:    Food Insecurity:      Worried About Running Out of Food in the Last Year:      Ran Out of Food in the Last Year:    Transportation Needs:      Lack of Transportation (Medical):      Lack of Transportation (Non-Medical):    Physical Activity:      Days of Exercise per Week:      Minutes of Exercise per Session:    Stress:      Feeling of Stress :    Social Connections:      Frequency of Communication with Friends and Family:      Frequency of Social Gatherings with Friends and Family:      Attends Holiness Services:      Active Member of Clubs or Organizations:      Attends Club or Organization Meetings:      Marital Status:     Intimate Partner Violence:      Fear of Current or Ex-Partner:      Emotionally Abused:      Physically Abused:      Sexually Abused:        Review of Systems   ,         Family History:  Family History   Problem Relation Age of Onset     Breast Cancer Cousin      Heart Disease Mother      Alzheimer Disease Father      Clotting Disorder No family hx of          Allergies:  Allergies   Allergen Reactions     Nsaids Other (See Comments)     Patient has history of microcolitis.  GI aggravation     Sulfa Drugs      Made her eyes stingy- it was eyedrops  Eye drops caused burning       Medications:  Current Outpatient Medications   Medication Sig Dispense Refill     acetaminophen (TYLENOL) 500 MG tablet Take 1,000 mg by mouth 2 (two) times a day.       aspirin (ASA) 81 MG EC tablet 1 tab(s)       atorvastatin (LIPITOR) 20 MG tablet 1 tablet       bismuth subsalicylate ([employer] BISMUTH) 262 MG chewable tablet Take 3 tablets by mouth 3 times daily as needed       Cholecalciferol (VITAMIN D3) 1000 units CAPS Take 1,000 Units by mouth daily.       Cyanocobalamin (VITAMIN B 12) 100 MCG LOZG Take 0.25 tablets by mouth daily       gabapentin (NEURONTIN) 300 MG capsule Take 300-600 mg by mouth At Bedtime       Magnesium Oxide 250 MG TABS Take 250 mg by mouth At Bedtime       spironolactone (ALDACTONE) 25 MG tablet 1 tablet       calcium carbonate (CALCIUM 600) 1500 (600 Ca) MG tablet Take 600 mg by mouth 2 times daily (Patient not taking: Reported on 8/30/2021)       KRILL OIL PO Take 1 capsule by mouth daily (Patient not taking: Reported on 8/30/2021)           Surgical site  Pacemaker is well-healed in left subclavicular site, leads are wrapped under the skin are satisfactory    Device interrogation  Underlying rhythm is heart block  Battery good for 8.5 years  3% atrial paced  100% ventricular pacing      Objective:   Vital signs:  /60 (BP Location: Left arm, Patient Position: Sitting, Cuff Size: Adult Regular)   Pulse 66    Resp 16   Wt 47.8 kg (105 lb 4.8 oz)   BMI 19.26 kg/m        Physical Exam:    GENERAL APPEARANCE: Alert, cooperative and in no acute distress.  HEENT: No scleral icterus. No Xanthelasma. Oral mucous membranes pink and moist.  NECK: JVP normal cm. No Hepatojugular reflux. Thyroid not  Palpable  CHEST: clear to auscultation and percussion  CARDIOVASCULAR: S1, S2 without murmur    Brachial, radial  pulses are intact and symmetric.   No carotid bruits noted.  ABDOMEN: Non tender. BS+. No bruits.  EXTREMITIES: No cyanosis, clubbing or edema.  Venostasis dermatitis    Lab Results:  LIPIDS:  Lab Results   Component Value Date    CHOL 151 07/15/2021    CHOL 247 (H) 07/23/2020    CHOL 182 06/06/2019     Lab Results   Component Value Date    HDL 68 07/15/2021    HDL 79 07/23/2020    HDL 53 06/06/2019     No components found for: LDLCALC  Lab Results   Component Value Date    TRIG 66 07/15/2021    TRIG 97 07/23/2020    TRIG 82 06/06/2019     No components found for: CHOLHDL    BMP:  Lab Results   Component Value Date    BUN 21 07/15/2021     07/15/2021    CO2 19 (L) 07/15/2021         This note has been dictated using voice recognition software. Any grammatical or context distortions are unintentional and inherent to the software.  Aaron Babcock MD  [facility]  [phone]

## 2021-08-30 NOTE — PROGRESS NOTES
In clinic device check with Dr. Babcock.  Please see link for full device report.  Patient was informed of results and next follow up during today's visit.

## 2021-08-30 NOTE — DISCHARGE INSTRUCTIONS
Your next Pacemaker check will be on 12/7/2021.  This will occur automatically with your home monitor.

## 2021-08-30 NOTE — LETTER
8/30/2021    Nate Albarran MD  [address] [place] [id number]    RE: Helena Brown       Dear Colleague,    I had the pleasure of seeing Helena Freddie Brown in the [facility].    [facility] Note  :  Assessment:  Complete  heart block with profound bradycardia-symptomatic  Dual-chamber [employer] pacemaker implanted March 29, 2019   dyssynchrony from left bundle branch block or from pacemaker although ventricular pacing is at 33%  Pacemaker upgrade to CRT-P; August 12, 2019. 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-lots of collaterals around the left subclavian region 2019  Leg ultrasound normal  warfarin stopped   No history of myocardial infarction; coronary CT angiogram May 7, 2019 showed moderate coronary artery stenosis with RCA 50-69%  Pulmonary function studies normal including normal diffusion capacity      Plan:  You have dyspnea on exertion although your exercise capacity is quite good  Oxygen saturations were good at rest and after exercise  Echocardiogram August 17, 2021 showed excellent heart function, ejection fraction was 60%-normal greater than 50%  Device interrogation shows excellent functionality no significant Abbott normal rhythm seen-100% biventricular pacing-you are pacemaker dependent  Battery good for another 8.5 years  It is not apparent why you have dyspnea on exertion-although your exercise endurance is pretty good  You did have a history of pulmonary embolism 2019 and are no longer taking anticoagulation  A chest CT angiogram will be scheduled to look for recurrence of pulmonary embolism  And leg ultrasound will be done to look for deep vein thrombosis          Subjective:    I had the opportunity to see.Helena Freddie Brown , who is a 81 year old female with a known history of complete heart block  She complains of dyspnea on exertion  She can walk in the  mall about a mile and a half but seems to have to pace herself if she goes too fast she becomes breathless  She has no orthopnea PND  Does have a history of chronic venous stasis and has been on spironolactone  No history of COPD and previously pulmonary function studies were normal  She did have evidence for pulmonary embolism 2019 small subsegmental emboli there was no deep vein thrombosis she was on warfarin for a while but this was stopped  No chest pain  Venous stasis changes to her legs but no swelling  No calf tenderness  She just is not satisfied with her endurance and exercise capacity  Also complains of some imbalance    Echocardiogram showed ejection fraction 60%  Previous EKG showed excellent narrowing-resynchronization  Laboratory evaluation July 15, 2021-normal CMP  Cholesterol 151 with LDL 70    [place] 19 she had subsegmental pulmonary embolism with normal leg studies and was on warfarin possible thromboembolism from her subclavian vein question  History of chronic venous stasis and has been on spironolactone  Diuretic      Saturations 99%  After a brisk walk saturations 98% heart rate 90            Problem List:  Patient Active Problem List   Diagnosis     Breast Palpatation Diffuse Fiberous Tissue Bilateral     Classic Macular Degeneration     Eczema     Menopause     Mild Intermittent asthma     Osteoarthrosis of knee     Restless leg syndrome     Tinnitus     Osteoporosis     Plantar warts     Medical History:  Past Medical History:   Diagnosis Date     Anxiety      Arthritis      Asthma      Colitis     Collagenous     Complete heart block (H)      Hypertriglyceridemia      Macular degeneration      Osteopenia      Osteoporosis      Pacemaker      Pulmonary embolism (H)      Restless legs      Skin cancer, basal cell      Surgical History:  Past Surgical History:   Procedure Laterality Date     APPENDECTOMY       ARTHROSCOPY SHOULDER ROTATOR CUFF REPAIR       BREAST EXCISIONAL BIOPSY Right  [id number]     BREAST SURGERY      biopsy     BUNIONECTOMY       C TOTAL KNEE ARTHROPLASTY Left 2018    Procedure:  LEFT MINIMALLY INVASIVE TOTAL KNEE ARTHROPLASTY;  Surgeon: Segun Banks MD;  Location: [facility] Main OR;  Service: Orthopedics     C TOTAL KNEE ARTHROPLASTY Right 2018    Procedure: RIGHT MINIMALLY INVASIVE TOTAL KNEE ARTHROPLASTY;  Surgeon: Segun Banks MD;  Location: [facility] Main OR;  Service: Orthopedics     EP PACEMAKER INSERT N/A 3/29/2019    Aaron Babcock MD;  Location: [facility] Cath Lab;  Service: Cardiology     IR EXTREMITY VENOGRAM LEFT  2019     IR EXTREMITY VENOGRAM LEFT  2019     TONSILLECTOMY       Social History:  Social History     Socioeconomic History     Marital status:      Spouse name: Not on file     Number of children: Not on file     Years of education: Not on file     Highest education level: Not on file   Occupational History     Not on file   Tobacco Use     Smoking status: Former Smoker     Packs/day: 0.50     Years: 2.00     Pack years: 1.00     Quit date: 1960     Years since quittin.8     Smokeless tobacco: Never Used     Tobacco comment: hasn't smoke in years   Substance and Sexual Activity     Alcohol use: Yes     Alcohol/week: 7.0 standard drinks     Comment: Alcoholic Drinks/day: per week     Drug use: No     Sexual activity: Not on file   Other Topics Concern     Not on file   Social History Narrative     Not on file     Social Determinants of Health     Financial Resource Strain:      Difficulty of Paying Living Expenses:    Food Insecurity:      Worried About Running Out of Food in the Last Year:      Ran Out of Food in the Last Year:    Transportation Needs:      Lack of Transportation (Medical):      Lack of Transportation (Non-Medical):    Physical Activity:      Days of Exercise per Week:      Minutes of Exercise per Session:    Stress:      Feeling of Stress :    Social Connections:      Frequency of  Communication with Friends and Family:      Frequency of Social Gatherings with Friends and Family:      Attends Hoahaoism Services:      Active Member of Clubs or Organizations:      Attends Club or Organization Meetings:      Marital Status:    Intimate Partner Violence:      Fear of Current or Ex-Partner:      Emotionally Abused:      Physically Abused:      Sexually Abused:        Review of Systems   ,         Family History:  Family History   Problem Relation Age of Onset     Breast Cancer Cousin      Heart Disease Mother      Alzheimer Disease Father      Clotting Disorder No family hx of          Allergies:  Allergies   Allergen Reactions     Nsaids Other (See Comments)     Patient has history of microcolitis.  GI aggravation     Sulfa Drugs      Made her eyes stingy- it was eyedrops  Eye drops caused burning       Medications:  Current Outpatient Medications   Medication Sig Dispense Refill     acetaminophen (TYLENOL) 500 MG tablet Take 1,000 mg by mouth 2 (two) times a day.       aspirin (ASA) 81 MG EC tablet 1 tab(s)       atorvastatin (LIPITOR) 20 MG tablet 1 tablet       bismuth subsalicylate ([employer] BISMUTH) 262 MG chewable tablet Take 3 tablets by mouth 3 times daily as needed       Cholecalciferol (VITAMIN D3) 1000 units CAPS Take 1,000 Units by mouth daily.       Cyanocobalamin (VITAMIN B 12) 100 MCG LOZG Take 0.25 tablets by mouth daily       gabapentin (NEURONTIN) 300 MG capsule Take 300-600 mg by mouth At Bedtime       Magnesium Oxide 250 MG TABS Take 250 mg by mouth At Bedtime       spironolactone (ALDACTONE) 25 MG tablet 1 tablet       calcium carbonate (CALCIUM 600) 1500 (600 Ca) MG tablet Take 600 mg by mouth 2 times daily (Patient not taking: Reported on 8/30/2021)       KRILL OIL PO Take 1 capsule by mouth daily (Patient not taking: Reported on 8/30/2021)           Surgical site  Pacemaker is well-healed in left subclavicular site, leads are wrapped under the skin are satisfactory    Device  interrogation  Underlying rhythm is heart block  Battery good for 8.5 years  3% atrial paced  100% ventricular pacing      Objective:   Vital signs:  /60 (BP Location: Left arm, Patient Position: Sitting, Cuff Size: Adult Regular)   Pulse 66   Resp 16   Wt 47.8 kg (105 lb 4.8 oz)   BMI 19.26 kg/m        Physical Exam:    GENERAL APPEARANCE: Alert, cooperative and in no acute distress.  HEENT: No scleral icterus. No Xanthelasma. Oral mucous membranes pink and moist.  NECK: JVP normal cm. No Hepatojugular reflux. Thyroid not  Palpable  CHEST: clear to auscultation and percussion  CARDIOVASCULAR: S1, S2 without murmur    Brachial, radial  pulses are intact and symmetric.   No carotid bruits noted.  ABDOMEN: Non tender. BS+. No bruits.  EXTREMITIES: No cyanosis, clubbing or edema.  Venostasis dermatitis    Lab Results:  LIPIDS:  Lab Results   Component Value Date    CHOL 151 07/15/2021    CHOL 247 (H) 07/23/2020    CHOL 182 06/06/2019     Lab Results   Component Value Date    HDL 68 07/15/2021    HDL 79 07/23/2020    HDL 53 06/06/2019     No components found for: LDLCALC  Lab Results   Component Value Date    TRIG 66 07/15/2021    TRIG 97 07/23/2020    TRIG 82 06/06/2019     No components found for: CHOLHDL    BMP:  Lab Results   Component Value Date    BUN 21 07/15/2021     07/15/2021    CO2 19 (L) 07/15/2021         This note has been dictated using voice recognition software. Any grammatical or context distortions are unintentional and inherent to the software.  Aaron Babcock MD  [facility]  [phone]          Thank you for allowing me to participate in the care of your patient.      Sincerely,     Aaron Babcock MD, MD     [facility]  cc:   No referring provider defined for this encounter.

## 2021-08-31 ENCOUNTER — TELEPHONE (OUTPATIENT)
Dept: CARDIOLOGY | Facility: CLINIC | Age: 81
End: 2021-08-31

## 2021-08-31 DIAGNOSIS — I26.99 OTHER PULMONARY EMBOLISM WITHOUT ACUTE COR PULMONALE, UNSPECIFIED CHRONICITY (H): ICD-10-CM

## 2021-08-31 DIAGNOSIS — I26.93 SINGLE SUBSEGMENTAL PULMONARY EMBOLISM WITHOUT ACUTE COR PULMONALE (H): Primary | ICD-10-CM

## 2021-08-31 NOTE — TELEPHONE ENCOUNTER
Phone call received from Helena stating she has questions after her visit with Dr. Babcock yesterday.    She is calling because our records have the wrong PCP listed. Pt states that Shawn Betheaales is her [phone].     Will update her chart to reflect this.    Pt also stated that she is supposed to have some tests done and she hasn't received a call to schedule yet.  Let her know that I can see CTA and also the request for [facility] to have leg ultrasound done and will ensure orders are entered so she will get this scheduled.    Let her know schedulers are short staffed right now so they should be calling soon to schedule.    No further questions or concerns at this time.    Kyra

## 2021-09-01 ENCOUNTER — TELEPHONE (OUTPATIENT)
Dept: CARDIOLOGY | Facility: CLINIC | Age: 81
End: 2021-09-01

## 2021-09-01 DIAGNOSIS — I26.93 SINGLE SUBSEGMENTAL PULMONARY EMBOLISM WITHOUT ACUTE COR PULMONALE (H): Primary | ICD-10-CM

## 2021-09-01 DIAGNOSIS — R06.02 SHORTNESS OF BREATH: ICD-10-CM

## 2021-09-01 DIAGNOSIS — Z86.711 HISTORY OF PULMONARY EMBOLISM: ICD-10-CM

## 2021-09-01 NOTE — TELEPHONE ENCOUNTER
Phone call from patient wanting to switch her CT to an MRI.    Chart reviewed, CT ordered to r/o PE with Dr. Babcock on 8-30-21.  This is the appropriate test.  Pt should call central scheduling to get this set up asap at [phone].  Return call to patient and no answer, left message for return call.

## 2021-09-10 ENCOUNTER — HOSPITAL ENCOUNTER (OUTPATIENT)
Dept: CT IMAGING | Facility: CLINIC | Age: 81
End: 2021-09-10
Attending: INTERNAL MEDICINE
Payer: MEDICARE

## 2021-09-10 ENCOUNTER — HOSPITAL ENCOUNTER (OUTPATIENT)
Dept: ULTRASOUND IMAGING | Facility: CLINIC | Age: 81
End: 2021-09-10
Attending: INTERNAL MEDICINE
Payer: MEDICARE

## 2021-09-10 DIAGNOSIS — I26.93 SINGLE SUBSEGMENTAL PULMONARY EMBOLISM WITHOUT ACUTE COR PULMONALE (H): ICD-10-CM

## 2021-09-10 DIAGNOSIS — I26.99 OTHER PULMONARY EMBOLISM WITHOUT ACUTE COR PULMONALE, UNSPECIFIED CHRONICITY (H): ICD-10-CM

## 2021-09-10 LAB
CREAT BLD-MCNC: 1.1 MG/DL (ref 0.6–1.1)
GFR SERPL CREATININE-BSD FRML MDRD: 47 ML/MIN/1.73M2

## 2021-09-10 PROCEDURE — 82565 ASSAY OF CREATININE: CPT

## 2021-09-10 PROCEDURE — 71275 CT ANGIOGRAPHY CHEST: CPT | Mod: MF

## 2021-09-10 PROCEDURE — 93970 EXTREMITY STUDY: CPT

## 2021-09-10 PROCEDURE — 250N000011 HC RX IP 250 OP 636: Performed by: INTERNAL MEDICINE

## 2021-09-10 RX ORDER — IOPAMIDOL 755 MG/ML
100 INJECTION, SOLUTION INTRAVASCULAR ONCE
Status: COMPLETED | OUTPATIENT
Start: 2021-09-10 | End: 2021-09-10

## 2021-09-10 RX ADMIN — IOPAMIDOL 75 ML: 755 INJECTION, SOLUTION INTRAVENOUS at 14:07

## 2021-09-10 NOTE — PROGRESS NOTES
[facility] Heart Care Note  :  Assessment:  Complete  heart block with profound bradycardia-symptomatic  Dual-chamber [employer] pacemaker implanted March 29, 2019  pericardial effusion-likely from pm lead perforation; ?atrial lead that had drop in P wave sensing  Reduced ejection fraction equals 40% may be related to dyssynchrony from left bundle branch block or from pacemaker although ventricular pacing is at 33%  Recent pulmonary embolism; possible from left subclavian vein occlusion-lots of collaterals around the left subclavian region  Leg ultrasound normal  Now on warfarin  No history of myocardial infarction; coronary CT angiogram May 7, 2019 showed moderate coronary artery stenosis with RCA 50-69%  Pulmonary function studies normal including normal diffusion capacity    ECG shows sinus rhythm left bundle branch block    Plan:  Pacemaker evaluation today is excellent  You do mostly have intrinsic conduction and pacing occurs 15% of the time  Your EKG shows a left bundle branch block and quite wide QRS  Recent pulmonary function studies were normal I do not believe you have any significant lung disease  Obtain a lung scan to see if there has been substantial pulmonary emboli  Repeat echocardiogram to assess ejection fraction and assess pulmonary artery pressure and look for pericardial effusion  Obtain a left arm venogram to see if left subclavian vein is occluded with clot   It may be that dyssynchrony is causing your symptoms.  We see LV dyssynchrony from a high amount of ventricular pacing or from a left bundle branch block  We need to consider upgrading her device to a biventricular system, which  would be placement of additional lead    Subjective:    I had the opportunity to see.Helena KESHIA Brown , who is a 79 y.o. female with a known history of   Patient initially presented to the hospital and profound bradycardia  She underwent urgent dual-chamber pacemaker, a [employer] device was implanted  29 2019  Since that time she has felt shortness of breath on exertion  Is comfortable at rest and has no breathlessness sleeping  Underwent comprehensive evaluation  Leg ultrasound was normal  Chest CT angiogram showed small distal pulmonary embolism and she was started on warfarin  She recently had pulmonary function studies that showed normal dynamics of breathing and capacity normal and normal diffusion capacity  Echocardiogram June 4, 2019 showed trace pericardial effusion ejection fraction is 49%  Inflammatory markers have been normal; sed rate 4, CRP 2.3    Hemoglobin 11.3  Cholesterol 182 with     There has been a decline in her endurance and exercise capacity.    Not long ago, she was very active and vigorous and did not seem to have any limitations  Now she developed shortness of breath when she exerts.  She was able to walk 500 m on the pulmonary function assessment with no drop in oxygen saturation remained greater than 94%     No awareness of arrhythmias  Orthopnea PND or pedal edema  Lisinopril has been discontinued because of low blood pressure  Metoprolol has been discontinued because of shortness of breath without any change in symptoms  He does not take diuretics has been prescribed on a as needed basis but has not taken in some time      She has been on warfarin INR is been satisfactory, tolerates medication with some bruising     is pushing hard for upgrade of the pacemaker to a biventricular system  Though she only has 15% ventricular pacing, per intrinsic conduction is left bundle branch block with quite a wide QRS line so she is at risk for dyssynchrony which may be causing her symptoms of dyspnea on exertion and exercise intolerance    Problem List:  Patient Active Problem List   Diagnosis     Osteoporosis     Degenerative arthritis of left knee     Restless leg syndrome     Elevated blood pressure reading without diagnosis of hypertension     Hyperkalemia     Degenerative  arthritis of right knee     Heart block     Complete AV block (H)     Exertional dyspnea     Cardiac pacemaker in situ     Acute HFrEF (heart failure with reduced ejection fraction) (H)     Other pulmonary embolism without acute cor pulmonale (H)     Medical History:  Past Medical History:   Diagnosis Date     Anxiety      Arthritis      Asthma      Colitis     Collagenous     Complete heart block (H)      Hypertriglyceridemia      Macular degeneration      Osteopenia      Osteoporosis      Pacemaker      Restless legs      Skin cancer, basal cell      Surgical History:  Past Surgical History:   Procedure Laterality Date     APPENDECTOMY       BREAST EXCISIONAL BIOPSY Right [id number]     BREAST SURGERY      biopsy     BUNIONECTOMY       EP PACEMAKER INSERT N/A 3/29/2019    Aaron Babcock MD;  Location: [facility] Cath Lab;  Service: Cardiology     AL TOTAL KNEE ARTHROPLASTY Left 2018    Procedure:  LEFT MINIMALLY INVASIVE TOTAL KNEE ARTHROPLASTY;  Surgeon: Segun Banks MD;  Location: [facility];  Service: Orthopedics     AL TOTAL KNEE ARTHROPLASTY Right 2018    Procedure: RIGHT MINIMALLY INVASIVE TOTAL KNEE ARTHROPLASTY;  Surgeon: Segun Banks MD;  Location: [facility] OR;  Service: Orthopedics     ROTATOR CUFF REPAIR       TONSILLECTOMY       Social History:  Social History     Socioeconomic History     Marital status:      Spouse name: Not on file     Number of children: Not on file     Years of education: Not on file     Highest education level: Not on file   Occupational History     Not on file   Social Needs     Financial resource strain: Not on file     Food insecurity:     Worry: Not on file     Inability: Not on file     Transportation needs:     Medical: Not on file     Non-medical: Not on file   Tobacco Use     Smoking status: Former Smoker     Packs/day: 0.50     Years: 2.00     Pack years: 1.00     Last attempt to quit: 1960     Years since quittin.7      Smokeless tobacco: Never Used   Substance and Sexual Activity     Alcohol use: Yes     Alcohol/week: 4.2 oz     Types: 7 Glasses of wine per week     Comment: per week     Drug use: No     Sexual activity: Not on file   Lifestyle     Physical activity:     Days per week: Not on file     Minutes per session: Not on file     Stress: Not on file   Relationships     Social connections:     Talks on phone: Not on file     Gets together: Not on file     Attends [employer] service: Not on file     Active member of club or organization: Not on file     Attends meetings of clubs or organizations: Not on file     Relationship status: Not on file     Intimate partner violence:     Fear of current or ex partner: Not on file     Emotionally abused: Not on file     Physically abused: Not on file     Forced sexual activity: Not on file   Other Topics Concern     Not on file   Social History Narrative     Not on file       Review of Systems:      General: WNL  Eyes: WNL  Ears/Nose/Throat: WNL  Lungs: Shortness of Breath  Heart: Shortness of Breath with activity  Stomach: WNL  Bladder: WNL  Muscle/Joints: WNL  Skin: WNL  Nervous System: WNL  Mental Health: WNL     Blood: Easy Bruising        Family History:  Family History   Problem Relation Age of Onset     Breast cancer Cousin      Heart disease Mother      Alzheimer's disease Father      Clotting disorder Neg Hx          Allergies:  Allergies   Allergen Reactions     Nsaids (Non-Steroidal Anti-Inflammatory Drug) Other (See Comments)     Patient has history of microcolitis.  GI aggravation     Sulfa (Sulfonamide Antibiotics)      Eye drops caused burning       Medications:  Current Outpatient Medications   Medication Sig Dispense Refill     acetaminophen (TYLENOL) 500 MG tablet Take 1,000 mg by mouth 2 (two) times a day.        CALCIUM CITRATE ORAL Take 600 mg by mouth 2 (two) times a day.        cholecalciferol, vitamin D3, (VITAMIN D3) 1,000 unit capsule Take 1,000 Units by  "mouth daily.       gabapentin (NEURONTIN) 300 MG capsule Take 300-600 mg by mouth at bedtime.              KRILL OIL ORAL Take 1 capsule by mouth daily.        magnesium oxide 250 mg Tab Take 250 mg by mouth at bedtime.        warfarin (COUMADIN/JANTOVEN) 3 MG tablet Take 1 tablet (3 mg total) by mouth daily. 30 tablet      bismuth subsalicylate (BISMUTH SUBSALICYLATE) 262 mg Chew chew tab Chew 3 tablets 3 (three) times a day.        furosemide (LASIX) 20 MG tablet Take 1 tablet (20 mg total) by mouth daily as needed (For WT gain of > 3 lb). 30 tablet 11     metoprolol succinate (TOPROL XL) 25 MG Take 0.5 tablets (12.5 mg total) by mouth daily. 30 tablet 6     No current facility-administered medications for this visit.          Surgical site  Pacemaker is well-healed left subclavicular site  Lots of collateral veins  Much tissue overlying the device    Device interrogation  Intrinsic rhythm  Sinus rhythm at 60 bpm with intact AV conduction  22% atrial pacing  15% ventricular pacing  No atrial arrhythmias  No ventricular arrhythmias  Lead function satisfactory  Device battery good for 14 years  Objective:   Vital signs:  BP 90/60 (Patient Site: Left Arm, Patient Position: Sitting, Cuff Size: Adult Regular)   Pulse 60   Resp 16   Ht 5' 2.5\" (1.588 m)   Wt 107 lb (48.5 kg)   BMI 19.26 kg/m    Pressure 108 sitting, 110 standing  Heart rate is 60 and regular without ectopy    Physical Exam:      GENERAL APPEARANCE: Alert, cooperative and in no acute distress.  HEENT: No scleral icterus. No Xanthelasma. Oral mucous membranes pink and moist.  NECK: JVP veins are flat there is a positive hepatojugular reflux cm.    Thyroid not  Palpable  CHEST: clear to auscultation and percussion  CARDIOVASCULAR: S1, S2 systolic ejection murmur     Brachial, radial  pulses are intact and symmetric.   No carotid bruits noted.  ABDOMEN: Non tender. BS+. No bruits.  EXTREMITIES: No cyanosis, clubbing or edema.    Lab " Results:  LIPIDS:  Lab Results   Component Value Date    CHOL 182 06/06/2019    CHOL 179 09/12/2018    CHOL 226 (H) 12/16/2015     Lab Results   Component Value Date    HDL 53 06/06/2019    HDL 61 09/12/2018    HDL 84 12/16/2015     Lab Results   Component Value Date    LDLCALC 113 06/06/2019    LDLCALC 105 09/12/2018    LDLCALC 106 12/16/2015     Lab Results   Component Value Date    TRIG 82 06/06/2019    TRIG 67 09/12/2018    TRIG 180 (H) 12/16/2015     No components found for: CHOLHDL    BMP:  Lab Results   Component Value Date    CREATININE 0.74 06/06/2019    BUN 20 06/06/2019     06/06/2019    K 4.6 06/06/2019     06/06/2019    CO2 25 06/06/2019         This note has been dictated using voice recognition software. Any grammatical or context distortions are unintentional and inherent to the software.  Aaron Babcock MD  [facility]  [phone]             none

## 2021-09-12 ENCOUNTER — DOCUMENTATION ONLY (OUTPATIENT)
Facility: CLINIC | Age: 81
End: 2021-09-12

## 2021-09-12 DIAGNOSIS — R06.09 DYSPNEA ON EXERTION: Primary | ICD-10-CM

## 2021-09-13 NOTE — PROGRESS NOTES
Phone call to pt to discuss results and recommendations, she agrees to plan.    She requests a copy of results be sent to her home.     Let her know I would send a message to our medical records team and have them send copies of chest CTA and US lower extremities for her.    No further questions or concerns at this time.    Will enter order for nuclear stress test.    Kyra

## 2021-09-13 NOTE — PROGRESS NOTES
FINDINGS: Exam includes the common femoral, femoral, popliteal veins as well as segmentally visualized deep calf veins and greater saphenous vein.      RIGHT: No deep vein thrombosis. No superficial thrombophlebitis. No popliteal cyst.     LEFT: No deep vein thrombosis. No superficial thrombophlebitis. No popliteal cyst.                                                                      IMPRESSION:  1.  No deep venous thrombosis in the bilateral lower extremities.       Chest CTA was normal-no DVT or PE  sats were normal  Prior PFtS WERE NORMAL   Plan  Schedule for   Adenosine/ Chemical stress nuclear scan

## 2021-09-13 NOTE — PROGRESS NOTES
FINDINGS: Exam includes the common femoral, femoral, popliteal veins as well as segmentally visualized deep calf veins and greater saphenous vein.      RIGHT: No deep vein thrombosis. No superficial thrombophlebitis. No popliteal cyst.     LEFT: No deep vein thrombosis. No superficial thrombophlebitis. No popliteal cyst.                                                                      IMPRESSION:  1.  No deep venous thrombosis in the bilateral lower extremities.      Chest CTA showed no Pulmonary embolism  Good LV function  Not sure  Why TSANG-sats were good   Plan  Should see pulmonary specialist   No cardiac adjustments

## 2021-10-12 ENCOUNTER — HOSPITAL ENCOUNTER (OUTPATIENT)
Dept: NUCLEAR MEDICINE | Facility: CLINIC | Age: 81
End: 2021-10-12
Attending: INTERNAL MEDICINE
Payer: MEDICARE

## 2021-10-12 ENCOUNTER — HOSPITAL ENCOUNTER (OUTPATIENT)
Dept: CARDIOLOGY | Facility: CLINIC | Age: 81
End: 2021-10-12
Attending: INTERNAL MEDICINE
Payer: MEDICARE

## 2021-10-12 DIAGNOSIS — R06.09 DOE (DYSPNEA ON EXERTION): Primary | ICD-10-CM

## 2021-10-12 DIAGNOSIS — R06.09 DYSPNEA ON EXERTION: ICD-10-CM

## 2021-10-12 LAB
CV STRESS CURRENT BP HE: NORMAL
CV STRESS CURRENT HR HE: 60
CV STRESS CURRENT HR HE: 60
CV STRESS CURRENT HR HE: 62
CV STRESS CURRENT HR HE: 63
CV STRESS CURRENT HR HE: 64
CV STRESS CURRENT HR HE: 64
CV STRESS CURRENT HR HE: 74
CV STRESS CURRENT HR HE: 75
CV STRESS CURRENT HR HE: 75
CV STRESS CURRENT HR HE: 76
CV STRESS CURRENT HR HE: 78
CV STRESS CURRENT HR HE: 82
CV STRESS CURRENT HR HE: 83
CV STRESS CURRENT HR HE: 83
CV STRESS CURRENT HR HE: 84
CV STRESS CURRENT HR HE: 85
CV STRESS CURRENT HR HE: 86
CV STRESS CURRENT HR HE: 86
CV STRESS DEVIATION TIME HE: NORMAL
CV STRESS ECHO PERCENT HR HE: NORMAL
CV STRESS EXERCISE STAGE HE: NORMAL
CV STRESS FINAL RESTING BP HE: NORMAL
CV STRESS FINAL RESTING HR HE: 64
CV STRESS MAX HR HE: 90
CV STRESS MAX TREADMILL GRADE HE: 0
CV STRESS MAX TREADMILL SPEED HE: 0
CV STRESS PEAK DIA BP HE: NORMAL
CV STRESS PEAK SYS BP HE: NORMAL
CV STRESS PHASE HE: NORMAL
CV STRESS PROTOCOL HE: NORMAL
CV STRESS RESTING PT POSITION HE: NORMAL
CV STRESS ST DEVIATION AMOUNT HE: NORMAL
CV STRESS ST DEVIATION ELEVATION HE: NORMAL
CV STRESS ST EVELATION AMOUNT HE: NORMAL
CV STRESS TEST TYPE HE: NORMAL
CV STRESS TOTAL STAGE TIME MIN 1 HE: NORMAL
STRESS ECHO BASELINE DIASTOLIC HE: 69
STRESS ECHO BASELINE HR: 60
STRESS ECHO BASELINE SYSTOLIC BP: 108
STRESS ECHO LAST STRESS DIASTOLIC BP: 52
STRESS ECHO LAST STRESS HR: 86
STRESS ECHO LAST STRESS SYSTOLIC BP: 81
STRESS ECHO TARGET HR: 139

## 2021-10-12 PROCEDURE — A9500 TC99M SESTAMIBI: HCPCS | Performed by: INTERNAL MEDICINE

## 2021-10-12 PROCEDURE — 93017 CV STRESS TEST TRACING ONLY: CPT | Performed by: INTERNAL MEDICINE

## 2021-10-12 PROCEDURE — 250N000011 HC RX IP 250 OP 636: Performed by: INTERNAL MEDICINE

## 2021-10-12 PROCEDURE — G1004 CDSM NDSC: HCPCS

## 2021-10-12 PROCEDURE — 93018 CV STRESS TEST I&R ONLY: CPT | Mod: MG | Performed by: GENERAL ACUTE CARE HOSPITAL

## 2021-10-12 PROCEDURE — 78452 HT MUSCLE IMAGE SPECT MULT: CPT | Mod: 26 | Performed by: GENERAL ACUTE CARE HOSPITAL

## 2021-10-12 PROCEDURE — 343N000001 HC RX 343: Performed by: INTERNAL MEDICINE

## 2021-10-12 PROCEDURE — 93016 CV STRESS TEST SUPVJ ONLY: CPT | Performed by: INTERNAL MEDICINE

## 2021-10-12 PROCEDURE — G1004 CDSM NDSC: HCPCS | Performed by: GENERAL ACUTE CARE HOSPITAL

## 2021-10-12 RX ORDER — REGADENOSON 0.08 MG/ML
0.4 INJECTION, SOLUTION INTRAVENOUS ONCE
Status: COMPLETED | OUTPATIENT
Start: 2021-10-12 | End: 2021-10-12

## 2021-10-12 RX ORDER — CAFFEINE 200 MG
200 TABLET ORAL
Status: DISCONTINUED | OUTPATIENT
Start: 2021-10-12 | End: 2021-10-12 | Stop reason: HOSPADM

## 2021-10-12 RX ORDER — IBUPROFEN 200 MG
1 CAPSULE ORAL DAILY
COMMUNITY

## 2021-10-12 RX ORDER — CAFFEINE CITRATE 20 MG/ML
60 SOLUTION INTRAVENOUS
Status: DISCONTINUED | OUTPATIENT
Start: 2021-10-12 | End: 2021-10-12 | Stop reason: HOSPADM

## 2021-10-12 RX ORDER — AMINOPHYLLINE 25 MG/ML
50 INJECTION, SOLUTION INTRAVENOUS
Status: DISCONTINUED | OUTPATIENT
Start: 2021-10-12 | End: 2021-10-12 | Stop reason: HOSPADM

## 2021-10-12 RX ORDER — ALBUTEROL SULFATE 0.83 MG/ML
2.5 SOLUTION RESPIRATORY (INHALATION)
Status: DISCONTINUED | OUTPATIENT
Start: 2021-10-12 | End: 2021-10-12 | Stop reason: HOSPADM

## 2021-10-12 RX ADMIN — Medication 8.6 MCI.: at 09:32

## 2021-10-12 RX ADMIN — Medication 29.6 MILLICURIE: at 10:30

## 2021-10-12 RX ADMIN — AMINOPHYLLINE 50 MG: 25 INJECTION, SOLUTION INTRAVENOUS at 10:52

## 2021-10-12 RX ADMIN — REGADENOSON 0.4 MG: 0.08 INJECTION, SOLUTION INTRAVENOUS at 10:44

## 2021-11-17 ENCOUNTER — HOSPITAL ENCOUNTER (OUTPATIENT)
Dept: MAMMOGRAPHY | Facility: CLINIC | Age: 81
Discharge: HOME | End: 2021-11-17
Attending: FAMILY MEDICINE | Admitting: FAMILY MEDICINE
Payer: MEDICARE

## 2021-11-17 DIAGNOSIS — Z12.31 VISIT FOR SCREENING MAMMOGRAM: ICD-10-CM

## 2021-11-17 PROCEDURE — 77067 SCR MAMMO BI INCL CAD: CPT

## 2021-12-07 ENCOUNTER — ANCILLARY PROCEDURE (OUTPATIENT)
Dept: CARDIOLOGY | Facility: CLINIC | Age: 81
End: 2021-12-07
Attending: INTERNAL MEDICINE
Payer: MEDICARE

## 2021-12-07 DIAGNOSIS — I44.2 THIRD DEGREE AV BLOCK (H): ICD-10-CM

## 2021-12-07 DIAGNOSIS — Z95.0 PACEMAKER: ICD-10-CM

## 2021-12-07 DIAGNOSIS — I42.8 NON-ISCHEMIC CARDIOMYOPATHY (H): ICD-10-CM

## 2021-12-07 LAB
MDC_IDC_EPISODE_DTM: NORMAL
MDC_IDC_EPISODE_DTM: NORMAL
MDC_IDC_EPISODE_ID: NORMAL
MDC_IDC_EPISODE_ID: NORMAL
MDC_IDC_EPISODE_TYPE: NORMAL
MDC_IDC_EPISODE_TYPE: NORMAL
MDC_IDC_LEAD_IMPLANT_DT: NORMAL
MDC_IDC_LEAD_LOCATION: NORMAL
MDC_IDC_LEAD_LOCATION_DETAIL_1: NORMAL
MDC_IDC_LEAD_MFG: NORMAL
MDC_IDC_LEAD_MODEL: NORMAL
MDC_IDC_LEAD_POLARITY_TYPE: NORMAL
MDC_IDC_LEAD_SERIAL: NORMAL
MDC_IDC_LEAD_SPECIAL_FUNCTION: NORMAL
MDC_IDC_MSMT_BATTERY_DTM: NORMAL
MDC_IDC_MSMT_BATTERY_REMAINING_LONGEVITY: 96 MO
MDC_IDC_MSMT_BATTERY_REMAINING_PERCENTAGE: 100 %
MDC_IDC_MSMT_BATTERY_STATUS: NORMAL
MDC_IDC_MSMT_LEADCHNL_LV_IMPEDANCE_VALUE: 816 OHM
MDC_IDC_MSMT_LEADCHNL_LV_PACING_THRESHOLD_AMPLITUDE: 1.7 V
MDC_IDC_MSMT_LEADCHNL_LV_PACING_THRESHOLD_PULSEWIDTH: 0.9 MS
MDC_IDC_MSMT_LEADCHNL_RA_IMPEDANCE_VALUE: 723 OHM
MDC_IDC_MSMT_LEADCHNL_RA_PACING_THRESHOLD_AMPLITUDE: 0.5 V
MDC_IDC_MSMT_LEADCHNL_RA_PACING_THRESHOLD_PULSEWIDTH: 0.4 MS
MDC_IDC_MSMT_LEADCHNL_RV_IMPEDANCE_VALUE: 796 OHM
MDC_IDC_MSMT_LEADCHNL_RV_PACING_THRESHOLD_AMPLITUDE: 0.8 V
MDC_IDC_MSMT_LEADCHNL_RV_PACING_THRESHOLD_PULSEWIDTH: 0.4 MS
MDC_IDC_PG_IMPLANT_DTM: NORMAL
MDC_IDC_PG_MFG: NORMAL
MDC_IDC_PG_MODEL: NORMAL
MDC_IDC_PG_SERIAL: NORMAL
MDC_IDC_PG_TYPE: NORMAL
MDC_IDC_SESS_CLINIC_NAME: NORMAL
MDC_IDC_SESS_DTM: NORMAL
MDC_IDC_SESS_TYPE: NORMAL
MDC_IDC_SET_BRADY_AT_MODE_SWITCH_MODE: NORMAL
MDC_IDC_SET_BRADY_AT_MODE_SWITCH_RATE: 170 {BEATS}/MIN
MDC_IDC_SET_BRADY_LOWRATE: 60 {BEATS}/MIN
MDC_IDC_SET_BRADY_MAX_SENSOR_RATE: 130 {BEATS}/MIN
MDC_IDC_SET_BRADY_MAX_TRACKING_RATE: 130 {BEATS}/MIN
MDC_IDC_SET_BRADY_MODE: NORMAL
MDC_IDC_SET_BRADY_PAV_DELAY_HIGH: 110 MS
MDC_IDC_SET_BRADY_PAV_DELAY_LOW: 130 MS
MDC_IDC_SET_BRADY_SAV_DELAY_HIGH: 85 MS
MDC_IDC_SET_BRADY_SAV_DELAY_LOW: 100 MS
MDC_IDC_SET_CRT_LVRV_DELAY: 0 MS
MDC_IDC_SET_CRT_PACED_CHAMBERS: NORMAL
MDC_IDC_SET_LEADCHNL_LV_PACING_AMPLITUDE: 2.8 V
MDC_IDC_SET_LEADCHNL_LV_PACING_ANODE_ELECTRODE_1: NORMAL
MDC_IDC_SET_LEADCHNL_LV_PACING_ANODE_LOCATION_1: NORMAL
MDC_IDC_SET_LEADCHNL_LV_PACING_CATHODE_ELECTRODE_1: NORMAL
MDC_IDC_SET_LEADCHNL_LV_PACING_CATHODE_LOCATION_1: NORMAL
MDC_IDC_SET_LEADCHNL_LV_PACING_PULSEWIDTH: 0.9 MS
MDC_IDC_SET_LEADCHNL_LV_SENSING_ADAPTATION_MODE: NORMAL
MDC_IDC_SET_LEADCHNL_LV_SENSING_ANODE_ELECTRODE_1: NORMAL
MDC_IDC_SET_LEADCHNL_LV_SENSING_ANODE_LOCATION_1: NORMAL
MDC_IDC_SET_LEADCHNL_LV_SENSING_CATHODE_ELECTRODE_1: NORMAL
MDC_IDC_SET_LEADCHNL_LV_SENSING_CATHODE_LOCATION_1: NORMAL
MDC_IDC_SET_LEADCHNL_LV_SENSING_SENSITIVITY: 1.5 MV
MDC_IDC_SET_LEADCHNL_RA_PACING_AMPLITUDE: 1.5 V
MDC_IDC_SET_LEADCHNL_RA_PACING_CAPTURE_MODE: NORMAL
MDC_IDC_SET_LEADCHNL_RA_PACING_POLARITY: NORMAL
MDC_IDC_SET_LEADCHNL_RA_PACING_PULSEWIDTH: 0.4 MS
MDC_IDC_SET_LEADCHNL_RA_SENSING_ADAPTATION_MODE: NORMAL
MDC_IDC_SET_LEADCHNL_RA_SENSING_POLARITY: NORMAL
MDC_IDC_SET_LEADCHNL_RA_SENSING_SENSITIVITY: 0.25 MV
MDC_IDC_SET_LEADCHNL_RV_PACING_AMPLITUDE: 2 V
MDC_IDC_SET_LEADCHNL_RV_PACING_CAPTURE_MODE: NORMAL
MDC_IDC_SET_LEADCHNL_RV_PACING_POLARITY: NORMAL
MDC_IDC_SET_LEADCHNL_RV_PACING_PULSEWIDTH: 0.4 MS
MDC_IDC_SET_LEADCHNL_RV_SENSING_ADAPTATION_MODE: NORMAL
MDC_IDC_SET_LEADCHNL_RV_SENSING_POLARITY: NORMAL
MDC_IDC_SET_LEADCHNL_RV_SENSING_SENSITIVITY: 1.5 MV
MDC_IDC_SET_ZONE_DETECTION_INTERVAL: 375 MS
MDC_IDC_SET_ZONE_TYPE: NORMAL
MDC_IDC_SET_ZONE_VENDOR_TYPE: NORMAL
MDC_IDC_STAT_AT_BURDEN_PERCENT: 0 %
MDC_IDC_STAT_AT_DTM_END: NORMAL
MDC_IDC_STAT_AT_DTM_START: NORMAL
MDC_IDC_STAT_BRADY_DTM_END: NORMAL
MDC_IDC_STAT_BRADY_DTM_START: NORMAL
MDC_IDC_STAT_BRADY_RA_PERCENT_PACED: 29 %
MDC_IDC_STAT_BRADY_RV_PERCENT_PACED: 100 %
MDC_IDC_STAT_CRT_DTM_END: NORMAL
MDC_IDC_STAT_CRT_DTM_START: NORMAL
MDC_IDC_STAT_CRT_LV_PERCENT_PACED: 100 %
MDC_IDC_STAT_EPISODE_RECENT_COUNT: 0
MDC_IDC_STAT_EPISODE_RECENT_COUNT_DTM_END: NORMAL
MDC_IDC_STAT_EPISODE_RECENT_COUNT_DTM_START: NORMAL
MDC_IDC_STAT_EPISODE_TYPE: NORMAL
MDC_IDC_STAT_EPISODE_VENDOR_TYPE: NORMAL

## 2021-12-07 PROCEDURE — 93296 REM INTERROG EVL PM/IDS: CPT | Performed by: INTERNAL MEDICINE

## 2021-12-07 PROCEDURE — 93294 REM INTERROG EVL PM/LDLS PM: CPT | Performed by: INTERNAL MEDICINE

## 2022-03-23 ENCOUNTER — ANCILLARY PROCEDURE (OUTPATIENT)
Dept: CARDIOLOGY | Facility: CLINIC | Age: 82
End: 2022-03-23
Attending: INTERNAL MEDICINE
Payer: MEDICARE

## 2022-03-23 DIAGNOSIS — Z95.0 PACEMAKER: ICD-10-CM

## 2022-03-23 DIAGNOSIS — I44.2 THIRD DEGREE AV BLOCK (H): ICD-10-CM

## 2022-03-23 DIAGNOSIS — I42.8 NON-ISCHEMIC CARDIOMYOPATHY (H): ICD-10-CM

## 2022-03-23 LAB
MDC_IDC_EPISODE_DTM: NORMAL
MDC_IDC_EPISODE_DURATION: 14 S
MDC_IDC_EPISODE_ID: NORMAL
MDC_IDC_EPISODE_TYPE: NORMAL
MDC_IDC_LEAD_IMPLANT_DT: NORMAL
MDC_IDC_LEAD_LOCATION: NORMAL
MDC_IDC_LEAD_LOCATION_DETAIL_1: NORMAL
MDC_IDC_LEAD_MFG: NORMAL
MDC_IDC_LEAD_MODEL: NORMAL
MDC_IDC_LEAD_POLARITY_TYPE: NORMAL
MDC_IDC_LEAD_SERIAL: NORMAL
MDC_IDC_LEAD_SPECIAL_FUNCTION: NORMAL
MDC_IDC_MSMT_BATTERY_DTM: NORMAL
MDC_IDC_MSMT_BATTERY_REMAINING_LONGEVITY: 90 MO
MDC_IDC_MSMT_BATTERY_REMAINING_PERCENTAGE: 100 %
MDC_IDC_MSMT_BATTERY_STATUS: NORMAL
MDC_IDC_MSMT_LEADCHNL_LV_IMPEDANCE_VALUE: 753 OHM
MDC_IDC_MSMT_LEADCHNL_LV_PACING_THRESHOLD_AMPLITUDE: 1.7 V
MDC_IDC_MSMT_LEADCHNL_LV_PACING_THRESHOLD_PULSEWIDTH: 0.9 MS
MDC_IDC_MSMT_LEADCHNL_RA_IMPEDANCE_VALUE: 649 OHM
MDC_IDC_MSMT_LEADCHNL_RA_PACING_THRESHOLD_AMPLITUDE: 0.5 V
MDC_IDC_MSMT_LEADCHNL_RA_PACING_THRESHOLD_PULSEWIDTH: 0.4 MS
MDC_IDC_MSMT_LEADCHNL_RV_IMPEDANCE_VALUE: 795 OHM
MDC_IDC_MSMT_LEADCHNL_RV_PACING_THRESHOLD_AMPLITUDE: 0.7 V
MDC_IDC_MSMT_LEADCHNL_RV_PACING_THRESHOLD_PULSEWIDTH: 0.4 MS
MDC_IDC_PG_IMPLANT_DTM: NORMAL
MDC_IDC_PG_MFG: NORMAL
MDC_IDC_PG_MODEL: NORMAL
MDC_IDC_PG_SERIAL: NORMAL
MDC_IDC_PG_TYPE: NORMAL
MDC_IDC_SESS_CLINIC_NAME: NORMAL
MDC_IDC_SESS_DTM: NORMAL
MDC_IDC_SESS_TYPE: NORMAL
MDC_IDC_SET_BRADY_AT_MODE_SWITCH_MODE: NORMAL
MDC_IDC_SET_BRADY_AT_MODE_SWITCH_RATE: 170 {BEATS}/MIN
MDC_IDC_SET_BRADY_LOWRATE: 60 {BEATS}/MIN
MDC_IDC_SET_BRADY_MAX_SENSOR_RATE: 130 {BEATS}/MIN
MDC_IDC_SET_BRADY_MAX_TRACKING_RATE: 130 {BEATS}/MIN
MDC_IDC_SET_BRADY_MODE: NORMAL
MDC_IDC_SET_BRADY_PAV_DELAY_HIGH: 110 MS
MDC_IDC_SET_BRADY_PAV_DELAY_LOW: 130 MS
MDC_IDC_SET_BRADY_SAV_DELAY_HIGH: 85 MS
MDC_IDC_SET_BRADY_SAV_DELAY_LOW: 100 MS
MDC_IDC_SET_CRT_LVRV_DELAY: 0 MS
MDC_IDC_SET_CRT_PACED_CHAMBERS: NORMAL
MDC_IDC_SET_LEADCHNL_LV_PACING_AMPLITUDE: 2.8 V
MDC_IDC_SET_LEADCHNL_LV_PACING_ANODE_ELECTRODE_1: NORMAL
MDC_IDC_SET_LEADCHNL_LV_PACING_ANODE_LOCATION_1: NORMAL
MDC_IDC_SET_LEADCHNL_LV_PACING_CATHODE_ELECTRODE_1: NORMAL
MDC_IDC_SET_LEADCHNL_LV_PACING_CATHODE_LOCATION_1: NORMAL
MDC_IDC_SET_LEADCHNL_LV_PACING_PULSEWIDTH: 0.9 MS
MDC_IDC_SET_LEADCHNL_LV_SENSING_ADAPTATION_MODE: NORMAL
MDC_IDC_SET_LEADCHNL_LV_SENSING_ANODE_ELECTRODE_1: NORMAL
MDC_IDC_SET_LEADCHNL_LV_SENSING_ANODE_LOCATION_1: NORMAL
MDC_IDC_SET_LEADCHNL_LV_SENSING_CATHODE_ELECTRODE_1: NORMAL
MDC_IDC_SET_LEADCHNL_LV_SENSING_CATHODE_LOCATION_1: NORMAL
MDC_IDC_SET_LEADCHNL_LV_SENSING_SENSITIVITY: 1.5 MV
MDC_IDC_SET_LEADCHNL_RA_PACING_AMPLITUDE: 1.5 V
MDC_IDC_SET_LEADCHNL_RA_PACING_CAPTURE_MODE: NORMAL
MDC_IDC_SET_LEADCHNL_RA_PACING_POLARITY: NORMAL
MDC_IDC_SET_LEADCHNL_RA_PACING_PULSEWIDTH: 0.4 MS
MDC_IDC_SET_LEADCHNL_RA_SENSING_ADAPTATION_MODE: NORMAL
MDC_IDC_SET_LEADCHNL_RA_SENSING_POLARITY: NORMAL
MDC_IDC_SET_LEADCHNL_RA_SENSING_SENSITIVITY: 0.25 MV
MDC_IDC_SET_LEADCHNL_RV_PACING_AMPLITUDE: 2 V
MDC_IDC_SET_LEADCHNL_RV_PACING_CAPTURE_MODE: NORMAL
MDC_IDC_SET_LEADCHNL_RV_PACING_POLARITY: NORMAL
MDC_IDC_SET_LEADCHNL_RV_PACING_PULSEWIDTH: 0.4 MS
MDC_IDC_SET_LEADCHNL_RV_SENSING_ADAPTATION_MODE: NORMAL
MDC_IDC_SET_LEADCHNL_RV_SENSING_POLARITY: NORMAL
MDC_IDC_SET_LEADCHNL_RV_SENSING_SENSITIVITY: 1.5 MV
MDC_IDC_SET_ZONE_DETECTION_INTERVAL: 375 MS
MDC_IDC_SET_ZONE_TYPE: NORMAL
MDC_IDC_SET_ZONE_VENDOR_TYPE: NORMAL
MDC_IDC_STAT_AT_BURDEN_PERCENT: 0 %
MDC_IDC_STAT_AT_DTM_END: NORMAL
MDC_IDC_STAT_AT_DTM_START: NORMAL
MDC_IDC_STAT_BRADY_DTM_END: NORMAL
MDC_IDC_STAT_BRADY_DTM_START: NORMAL
MDC_IDC_STAT_BRADY_RA_PERCENT_PACED: 21 %
MDC_IDC_STAT_BRADY_RV_PERCENT_PACED: 100 %
MDC_IDC_STAT_CRT_DTM_END: NORMAL
MDC_IDC_STAT_CRT_DTM_START: NORMAL
MDC_IDC_STAT_CRT_LV_PERCENT_PACED: 100 %
MDC_IDC_STAT_EPISODE_RECENT_COUNT: 0
MDC_IDC_STAT_EPISODE_RECENT_COUNT: 1
MDC_IDC_STAT_EPISODE_RECENT_COUNT_DTM_END: NORMAL
MDC_IDC_STAT_EPISODE_RECENT_COUNT_DTM_START: NORMAL
MDC_IDC_STAT_EPISODE_TYPE: NORMAL
MDC_IDC_STAT_EPISODE_VENDOR_TYPE: NORMAL

## 2022-03-23 PROCEDURE — 93294 REM INTERROG EVL PM/LDLS PM: CPT | Performed by: INTERNAL MEDICINE

## 2022-03-23 PROCEDURE — 93296 REM INTERROG EVL PM/IDS: CPT | Performed by: INTERNAL MEDICINE

## 2022-04-04 DIAGNOSIS — I44.2 THIRD DEGREE AV BLOCK (H): ICD-10-CM

## 2022-04-04 DIAGNOSIS — Z95.0 STATUS POST BIVENTRICULAR PACEMAKER: ICD-10-CM

## 2022-04-04 DIAGNOSIS — I42.8 NON-ISCHEMIC CARDIOMYOPATHY (H): Primary | ICD-10-CM

## 2022-07-04 ENCOUNTER — HOSPITAL ENCOUNTER (INPATIENT)
Facility: CLINIC | Age: 82
LOS: 3 days | Discharge: NURSING, REHAB OR LONG-TERM CARE | DRG: 543 | End: 2022-07-07
Attending: EMERGENCY MEDICINE | Admitting: STUDENT IN AN ORGANIZED HEALTH CARE EDUCATION/TRAINING PROGRAM
Payer: MEDICARE

## 2022-07-04 ENCOUNTER — APPOINTMENT (OUTPATIENT)
Dept: RADIOLOGY | Facility: CLINIC | Age: 82
DRG: 543 | End: 2022-07-04
Attending: EMERGENCY MEDICINE
Payer: MEDICARE

## 2022-07-04 ENCOUNTER — APPOINTMENT (OUTPATIENT)
Dept: CT IMAGING | Facility: CLINIC | Age: 82
DRG: 543 | End: 2022-07-04
Attending: EMERGENCY MEDICINE
Payer: MEDICARE

## 2022-07-04 DIAGNOSIS — M81.0 AGE RELATED OSTEOPOROSIS, UNSPECIFIED PATHOLOGICAL FRACTURE PRESENCE: ICD-10-CM

## 2022-07-04 DIAGNOSIS — S32.810A: Primary | ICD-10-CM

## 2022-07-04 LAB
ANION GAP SERPL CALCULATED.3IONS-SCNC: 9 MMOL/L (ref 5–18)
BASOPHILS # BLD AUTO: 0 10E3/UL (ref 0–0.2)
BASOPHILS NFR BLD AUTO: 0 %
BUN SERPL-MCNC: 19 MG/DL (ref 8–28)
CALCIUM SERPL-MCNC: 9.1 MG/DL (ref 8.5–10.5)
CHLORIDE BLD-SCNC: 105 MMOL/L (ref 98–107)
CO2 SERPL-SCNC: 24 MMOL/L (ref 22–31)
CREAT SERPL-MCNC: 0.86 MG/DL (ref 0.6–1.1)
EOSINOPHIL # BLD AUTO: 0 10E3/UL (ref 0–0.7)
EOSINOPHIL NFR BLD AUTO: 0 %
ERYTHROCYTE [DISTWIDTH] IN BLOOD BY AUTOMATED COUNT: 13.2 % (ref 10–15)
GFR SERPL CREATININE-BSD FRML MDRD: 67 ML/MIN/1.73M2
GLUCOSE BLD-MCNC: 133 MG/DL (ref 70–125)
HCT VFR BLD AUTO: 36.7 % (ref 35–47)
HGB BLD-MCNC: 12.2 G/DL (ref 11.7–15.7)
IMM GRANULOCYTES # BLD: 0 10E3/UL
IMM GRANULOCYTES NFR BLD: 0 %
LYMPHOCYTES # BLD AUTO: 1.1 10E3/UL (ref 0.8–5.3)
LYMPHOCYTES NFR BLD AUTO: 11 %
MCH RBC QN AUTO: 30.1 PG (ref 26.5–33)
MCHC RBC AUTO-ENTMCNC: 33.2 G/DL (ref 31.5–36.5)
MCV RBC AUTO: 91 FL (ref 78–100)
MONOCYTES # BLD AUTO: 0.5 10E3/UL (ref 0–1.3)
MONOCYTES NFR BLD AUTO: 5 %
NEUTROPHILS # BLD AUTO: 7.9 10E3/UL (ref 1.6–8.3)
NEUTROPHILS NFR BLD AUTO: 84 %
NRBC # BLD AUTO: 0 10E3/UL
NRBC BLD AUTO-RTO: 0 /100
PLATELET # BLD AUTO: 132 10E3/UL (ref 150–450)
POTASSIUM BLD-SCNC: 3.9 MMOL/L (ref 3.5–5)
RBC # BLD AUTO: 4.05 10E6/UL (ref 3.8–5.2)
SARS-COV-2 RNA RESP QL NAA+PROBE: NEGATIVE
SODIUM SERPL-SCNC: 138 MMOL/L (ref 136–145)
WBC # BLD AUTO: 9.5 10E3/UL (ref 4–11)

## 2022-07-04 PROCEDURE — 73030 X-RAY EXAM OF SHOULDER: CPT | Mod: RT

## 2022-07-04 PROCEDURE — G1010 CDSM STANSON: HCPCS

## 2022-07-04 PROCEDURE — 99285 EMERGENCY DEPT VISIT HI MDM: CPT | Mod: 25

## 2022-07-04 PROCEDURE — 36415 COLL VENOUS BLD VENIPUNCTURE: CPT

## 2022-07-04 PROCEDURE — U0003 INFECTIOUS AGENT DETECTION BY NUCLEIC ACID (DNA OR RNA); SEVERE ACUTE RESPIRATORY SYNDROME CORONAVIRUS 2 (SARS-COV-2) (CORONAVIRUS DISEASE [COVID-19]), AMPLIFIED PROBE TECHNIQUE, MAKING USE OF HIGH THROUGHPUT TECHNOLOGIES AS DESCRIBED BY CMS-2020-01-R: HCPCS | Performed by: EMERGENCY MEDICINE

## 2022-07-04 PROCEDURE — 82310 ASSAY OF CALCIUM: CPT

## 2022-07-04 PROCEDURE — C9803 HOPD COVID-19 SPEC COLLECT: HCPCS

## 2022-07-04 PROCEDURE — 120N000001 HC R&B MED SURG/OB

## 2022-07-04 PROCEDURE — 250N000011 HC RX IP 250 OP 636

## 2022-07-04 PROCEDURE — 73502 X-RAY EXAM HIP UNI 2-3 VIEWS: CPT

## 2022-07-04 PROCEDURE — 250N000013 HC RX MED GY IP 250 OP 250 PS 637: Performed by: EMERGENCY MEDICINE

## 2022-07-04 PROCEDURE — 72125 CT NECK SPINE W/O DYE: CPT | Mod: ME

## 2022-07-04 PROCEDURE — 250N000013 HC RX MED GY IP 250 OP 250 PS 637

## 2022-07-04 PROCEDURE — 85025 COMPLETE CBC W/AUTO DIFF WBC: CPT

## 2022-07-04 RX ORDER — OXYCODONE HYDROCHLORIDE 5 MG/1
5 TABLET ORAL ONCE
Status: COMPLETED | OUTPATIENT
Start: 2022-07-04 | End: 2022-07-04

## 2022-07-04 RX ORDER — AMOXICILLIN 250 MG
1 CAPSULE ORAL 2 TIMES DAILY
Status: DISCONTINUED | OUTPATIENT
Start: 2022-07-04 | End: 2022-07-07 | Stop reason: HOSPADM

## 2022-07-04 RX ORDER — NALOXONE HYDROCHLORIDE 0.4 MG/ML
0.2 INJECTION, SOLUTION INTRAMUSCULAR; INTRAVENOUS; SUBCUTANEOUS
Status: DISCONTINUED | OUTPATIENT
Start: 2022-07-04 | End: 2022-07-07 | Stop reason: HOSPADM

## 2022-07-04 RX ORDER — AMOXICILLIN 250 MG
2 CAPSULE ORAL 2 TIMES DAILY
Status: DISCONTINUED | OUTPATIENT
Start: 2022-07-04 | End: 2022-07-07 | Stop reason: HOSPADM

## 2022-07-04 RX ORDER — ENOXAPARIN SODIUM 100 MG/ML
40 INJECTION SUBCUTANEOUS EVERY 24 HOURS
Status: DISCONTINUED | OUTPATIENT
Start: 2022-07-04 | End: 2022-07-07 | Stop reason: HOSPADM

## 2022-07-04 RX ORDER — ACETAMINOPHEN 325 MG/1
975 TABLET ORAL EVERY 8 HOURS
Status: DISCONTINUED | OUTPATIENT
Start: 2022-07-05 | End: 2022-07-07 | Stop reason: HOSPADM

## 2022-07-04 RX ORDER — LIDOCAINE 40 MG/G
CREAM TOPICAL
Status: CANCELLED | OUTPATIENT
Start: 2022-07-04

## 2022-07-04 RX ORDER — ATORVASTATIN CALCIUM 10 MG/1
20 TABLET, FILM COATED ORAL AT BEDTIME
Status: DISCONTINUED | OUTPATIENT
Start: 2022-07-04 | End: 2022-07-07 | Stop reason: HOSPADM

## 2022-07-04 RX ORDER — GABAPENTIN 300 MG/1
300 CAPSULE ORAL AT BEDTIME
Status: DISCONTINUED | OUTPATIENT
Start: 2022-07-04 | End: 2022-07-07 | Stop reason: HOSPADM

## 2022-07-04 RX ORDER — NALOXONE HYDROCHLORIDE 0.4 MG/ML
0.4 INJECTION, SOLUTION INTRAMUSCULAR; INTRAVENOUS; SUBCUTANEOUS
Status: DISCONTINUED | OUTPATIENT
Start: 2022-07-04 | End: 2022-07-07 | Stop reason: HOSPADM

## 2022-07-04 RX ORDER — ONDANSETRON 2 MG/ML
4 INJECTION INTRAMUSCULAR; INTRAVENOUS EVERY 6 HOURS PRN
Status: DISCONTINUED | OUTPATIENT
Start: 2022-07-04 | End: 2022-07-07 | Stop reason: HOSPADM

## 2022-07-04 RX ORDER — UBIDECARENONE 75 MG
100 CAPSULE ORAL DAILY
COMMUNITY

## 2022-07-04 RX ORDER — ACETAMINOPHEN 325 MG/1
975 TABLET ORAL ONCE
Status: COMPLETED | OUTPATIENT
Start: 2022-07-04 | End: 2022-07-04

## 2022-07-04 RX ORDER — CALCIUM CARBONATE 500 MG/1
1000 TABLET, CHEWABLE ORAL 4 TIMES DAILY PRN
Status: DISCONTINUED | OUTPATIENT
Start: 2022-07-04 | End: 2022-07-07 | Stop reason: HOSPADM

## 2022-07-04 RX ORDER — LIDOCAINE 40 MG/G
CREAM TOPICAL
Status: DISCONTINUED | OUTPATIENT
Start: 2022-07-04 | End: 2022-07-07 | Stop reason: HOSPADM

## 2022-07-04 RX ORDER — IBUPROFEN 200 MG
950 CAPSULE ORAL DAILY
Status: DISCONTINUED | OUTPATIENT
Start: 2022-07-05 | End: 2022-07-07 | Stop reason: HOSPADM

## 2022-07-04 RX ORDER — ONDANSETRON 4 MG/1
4 TABLET, ORALLY DISINTEGRATING ORAL EVERY 6 HOURS PRN
Status: DISCONTINUED | OUTPATIENT
Start: 2022-07-04 | End: 2022-07-07 | Stop reason: HOSPADM

## 2022-07-04 RX ORDER — SPIRONOLACTONE 25 MG/1
25 TABLET ORAL DAILY
Status: DISCONTINUED | OUTPATIENT
Start: 2022-07-05 | End: 2022-07-07 | Stop reason: HOSPADM

## 2022-07-04 RX ADMIN — OXYCODONE HYDROCHLORIDE 5 MG: 5 TABLET ORAL at 18:25

## 2022-07-04 RX ADMIN — ENOXAPARIN SODIUM 40 MG: 100 INJECTION SUBCUTANEOUS at 22:26

## 2022-07-04 RX ADMIN — ATORVASTATIN CALCIUM 20 MG: 10 TABLET, FILM COATED ORAL at 22:05

## 2022-07-04 RX ADMIN — ACETAMINOPHEN 975 MG: 325 TABLET ORAL at 18:24

## 2022-07-04 RX ADMIN — GABAPENTIN 300 MG: 300 CAPSULE ORAL at 22:06

## 2022-07-04 ASSESSMENT — ACTIVITIES OF DAILY LIVING (ADL)
ADLS_ACUITY_SCORE: 30
ADLS_ACUITY_SCORE: 36
DEPENDENT_IADLS:: INDEPENDENT

## 2022-07-04 ASSESSMENT — ENCOUNTER SYMPTOMS
HEADACHES: 0
NECK PAIN: 0

## 2022-07-04 NOTE — ED TRIAGE NOTES
Pt tripped while vacuuming and fell down 2-3 steps hitting head and right shoulder. Pt endorses pain to right groin also. PT denies being on blood thinners or LOC     Triage Assessment     Row Name 07/04/22 [id number]       Triage Assessment (Adult)    Airway WDL WDL       Respiratory WDL    Respiratory WDL WDL       Skin Circulation/Temperature WDL    Skin Circulation/Temperature WDL WDL       Cardiac WDL    Cardiac WDL WDL       Peripheral/Neurovascular WDL    Peripheral Neurovascular WDL WDL       Cognitive/Neuro/Behavioral WDL    Cognitive/Neuro/Behavioral WDL WDL

## 2022-07-04 NOTE — ED PROVIDER NOTES
EMERGENCY DEPARTMENT ENCOUNTER      NAME: Helena Brown  AGE: 82 year old female  YOB: 1940  MRN: [id number]  EVALUATION DATE & TIME: 7/4/2022  5:45 PM    PCP: Qasim Welsh    ED PROVIDER: Deneen Gallegos M.D.      Chief Complaint   Patient presents with     Fall     Hip Pain     Groin Pain     Head Injury         FINAL IMPRESSION:  1. Multiple closed pelvic fractures with disruption of pelvic [place], initial encounter (H)        MEDICAL DECISION MAKING:    Pertinent Labs & Imaging studies reviewed. (See chart for details)  ED Course as of 07/04/22 1909   Mon Jul 04, 2022   1751 Afebrile.  Vital signs here unremarkable.  Patient is coming in with head pain, shoulder pain and groin pain after fall.  Fell with mechanical fall approximately 1 hour prior to arrival.  Fell on carpeted ground.  Struck the back of her head, right shoulder.  Was able to get up with help but is having significant difficulty walking secondary to pain in her groin.  No blood thinner medications.  No loss of consciousness.    Exam for patient here with small cephalhematoma right parietal region.  No midline C-spine tenderness.  TMs are clear bilaterally without hemotympanum.  Pupils 3 mm equal round reactive to light with extraocular movements intact.  Large bruise over her clavicle.  Able to move her shoulder with some discomfort but no bony tenderness to palpation along her shoulder.  No chest wall or abdominal tenderness.  No midline back tenderness.  No right-sided hip pain.  Able to move her leg without issue.  Does have pain with pelvic stress in her right groin.    Give pain medication, imaging.       Head CT, shoulder x-ray without issue, multiple pelvic fractures.  Patient's pain is very well controlled here.  I did call and speak with residency service will be admitting.  They at this time are not requesting any labs, at this time will just admit patient.  Patient and  both updated.  They are in agreement  with plan.    Critical care: 0 minutes excluding separately billable procedures.  Includes bedside management, time reviewing test results, review of records, discussing the case with staff, documenting the medical record and time spent with family members (or surrogate decision makers) discussing specific treatment issues.          ED COURSE:    5:50 PM I met with the patient, obtained history, performed an initial exam, and discussed options and plan for diagnostics and treatment here in the ED.  7:02 PM Updated patient on imaging results.   7:07 PM Spoke with hospitalist, Ela Bell.     The importance of close follow up was discussed. We reviewed warning signs and symptoms, and I instructed Ms. Brown to return to the emergency department immediately if she develops any new or worsening symptoms. I provided additional verbal discharge instructions. Ms. Brown expressed understanding and agreement with this plan of care, her questions were answered, and she was discharged in stable condition.     MEDICATIONS GIVEN IN THE EMERGENCY:  Medications   oxyCODONE (ROXICODONE) tablet 5 mg (5 mg Oral Given 7/4/22 1825)   acetaminophen (TYLENOL) tablet 975 mg (975 mg Oral Given 7/4/22 1824)       NEW PRESCRIPTIONS STARTED AT TODAY'S ER VISIT:  New Prescriptions    No medications on file          =================================================================    HPI    Patient information was obtained from: Patient    Use of : N/A         Helena Brown is a 82 year old female who presents via private auto for evaluation of fall.    At ~4:45 PM today, patient was vacuuming when she tripped and fell. Patient landed on her right hip and shoulder. She did not lose consciousness, thought she did hit the right side of her head. Since the incident, patient has endorsed in pain to the right hip and right collarbone. Her hip pain radiates to the right groin and is provoked with standing up and walking. Patient has a  significant bruise to the right collarbone, as well as bruising and a small abrasion to the right side of her head. Currently, her pain is most relieved lying at rest. She does not endorse in an associated headache.     Patient is otherwise healthy with no complaints at this time. She denies any allergies. Patient denies neck pain or any other complaints at this time.     REVIEW OF SYSTEMS   Review of Systems   Musculoskeletal: Negative for neck pain.        Positive for right hip/groin pain.  Positive for right anterior shoulder pain.   Skin:        Positive for bruising to right collarbone.  Positive for bruising/abrasion to right side of head.   Neurological: Negative for headaches.   All other systems reviewed and are negative.      PAST MEDICAL HISTORY:  Past Medical History:   Diagnosis Date     Anxiety      Arthritis      Asthma      Colitis     Collagenous     Complete heart block (H)      Hypertriglyceridemia      Macular degeneration      Osteopenia      Osteoporosis      Pacemaker      Pulmonary embolism (H)      Restless legs      Skin cancer, basal cell        PAST SURGICAL HISTORY:  Past Surgical History:   Procedure Laterality Date     APPENDECTOMY       ARTHROSCOPY SHOULDER ROTATOR CUFF REPAIR       BREAST EXCISIONAL BIOPSY Right [id number]     BREAST SURGERY      biopsy     BUNIONECTOMY       EP PACEMAKER INSERT N/A 3/29/2019    Aaron Babcock MD;  Location: [facility];  Service: Cardiology     IR EXTREMITY VENOGRAM LEFT  7/25/2019     IR EXTREMITY VENOGRAM LEFT  7/25/2019     TONSILLECTOMY       [facility] TOTAL KNEE ARTHROPLASTY Left 8/30/2018    Procedure:  LEFT MINIMALLY INVASIVE TOTAL KNEE ARTHROPLASTY;  Surgeon: Segun Banks MD;  Location: [facility];  Service: Orthopedics     [facility] TOTAL KNEE ARTHROPLASTY Right 11/8/2018    Procedure: RIGHT MINIMALLY INVASIVE TOTAL KNEE ARTHROPLASTY;  Surgeon: Segun Banks MD;  Location: [facility];  Service: Orthopedics        CURRENT MEDICATIONS:    No current facility-administered medications for this encounter.    Current Outpatient Medications:      acetaminophen (TYLENOL) 500 MG tablet, Take 1,000 mg by mouth 2 (two) times a day., Disp: , Rfl:      aspirin (ASA) 81 MG EC tablet, 1 tab(s), Disp: , Rfl:      atorvastatin (LIPITOR) 20 MG tablet, 1 tablet, Disp: , Rfl:      bismuth subsalicylate ([employer] BISMUTH) 262 MG chewable tablet, Take 3 tablets by mouth 3 times daily as needed, Disp: , Rfl:      calcium citrate (CITRACAL) 950 (200 Ca) MG tablet, Take 1 tablet by mouth 2 times daily, Disp: , Rfl:      Cholecalciferol (VITAMIN D3) 1000 units CAPS, Take 1,000 Units by mouth daily., Disp: , Rfl:      Cyanocobalamin (VITAMIN B 12) 100 MCG LOZG, Take 0.25 tablets by mouth daily, Disp: , Rfl:      gabapentin (NEURONTIN) 300 MG capsule, Take 300-600 mg by mouth At Bedtime, Disp: , Rfl:      KRILL OIL PO, Take 1 capsule by mouth daily , Disp: , Rfl:      Magnesium Oxide 250 MG TABS, Take 250 mg by mouth At Bedtime, Disp: , Rfl:      spironolactone (ALDACTONE) 25 MG tablet, 1 tablet, Disp: , Rfl:     ALLERGIES:  Allergies   Allergen Reactions     Nsaids Other (See Comments)     Patient has history of microcolitis.  GI aggravation     Sulfa Drugs      Made her eyes stingy- it was eyedrops  Eye drops caused burning       FAMILY HISTORY:  Family History   Problem Relation Age of Onset     Breast Cancer Cousin      Heart Disease Mother      Alzheimer Disease Father      Clotting Disorder No family hx of        SOCIAL HISTORY:   Social History     Socioeconomic History     Marital status:    Tobacco Use     Smoking status: Former Smoker     Packs/day: 0.50     Years: 2.00     Pack years: 1.00     Quit date: 1960     Years since quittin.6     Smokeless tobacco: Never Used     Tobacco comment: hasn't smoke in years   Substance and Sexual Activity     Alcohol use: Yes     Alcohol/week: 7.0 standard drinks     Comment: Alcoholic  "Drinks/day: per week     Drug use: No       PHYSICAL EXAM:    Vitals: BP (!) 140/78   Pulse 60   Temp 97.6  F (36.4  C) (Temporal)   Resp 16   Ht 1.575 m (5' 2\")   Wt 48.1 kg (106 lb)   SpO2 97%   BMI 19.39 kg/m     General:. Alert and interactive, comfortable appearing.  HENT: Oropharynx without erythema or exudates. MMM.  TMs clear bilaterally without hemotympanum.   Eyes: Pupils 3 mm equal round reactive to light with extraocular movements intact.  Neck: Full AROM.  No midline tenderness to palpation.  Cardiovascular: Regular rate and rhythm. Peripheral pulses 2+ bilaterally.  Chest/Pulmonary: Normal work of breathing. Lung sounds clear and equal throughout, no wheezes or crackles. No chest wall tenderness or deformities.  Abdomen: Soft, nondistended. Nontender without guarding or rebound.  Back/Spine: No CVA or midline C-spine tenderness.  Extremities: No lower extremity edema. Able to move her shoulder with some discomfort but no bony tenderness to palpation along her shoulder. No right-sided hip pain to palpation. Able to move her leg without issue. Pain with pelvic stress in her right groin.   Skin: Warm and dry. Small cephalhematoma to right parietal region. Large bruise over right clavicle.   Neuro: Speech clear. CNs grossly intact. Moves all extremities appropriately. Strength and sensation grossly intact to all extremities.   Psych: Normal affect/mood, cooperative, memory appropriate.     LAB:  All pertinent labs reviewed and interpreted.  Labs Ordered and Resulted from Time of ED Arrival to Time of ED Departure - No data to display    RADIOLOGY:  XR Pelvis and Hip Right 2 Views   Final Result   IMPRESSION: Acute displaced fracture of the right superior pubic ramus laterally along with femoroacetabular junction. Acute mildly displaced fracture of the right inferior pubic ramus. Mild deformity right sacral ala could represent fracture. No    proximal femur fracture. Mild degenerative arthritis right " hip joint. Degenerative changes lumbar spine.      Pelvic calcifications suggestive of calcified uterine fibroids.      XR Shoulder Right 2 Views   Final Result   IMPRESSION: Normal joint spaces and alignment. No fracture.       Cervical spine CT w/o contrast   Final Result   IMPRESSION:   1.  No acute cervical spine fracture.      Head CT w/o contrast   Final Result   IMPRESSION:   1.  No acute intracranial hemorrhage.   2.  Right parietal scalp hematoma without underlying calvarial fracture.          I, Mariangel Molina, am serving as a scribe to document services personally performed by Dr. Deneen Gallegos  based on my observation and the provider's statements to me. I, Deneen Gallegos MD attest that Mariangel Molina is acting in a scribe capacity, has observed my performance of the services and has documented them in accordance with my direction.      Deneen Gallegos M.D.  Emergency Medicine  [facility] EMERGENCY ROOM  [address] [id number]  [phone]  Dept: [phone]     Pamela Gallegos MD  07/04/22 2086

## 2022-07-05 ENCOUNTER — APPOINTMENT (OUTPATIENT)
Dept: PHYSICAL THERAPY | Facility: CLINIC | Age: 82
DRG: 543 | End: 2022-07-05
Payer: MEDICARE

## 2022-07-05 ENCOUNTER — APPOINTMENT (OUTPATIENT)
Dept: OCCUPATIONAL THERAPY | Facility: CLINIC | Age: 82
DRG: 543 | End: 2022-07-05
Payer: MEDICARE

## 2022-07-05 ENCOUNTER — APPOINTMENT (OUTPATIENT)
Dept: CT IMAGING | Facility: CLINIC | Age: 82
DRG: 543 | End: 2022-07-05
Attending: PHYSICIAN ASSISTANT
Payer: MEDICARE

## 2022-07-05 PROBLEM — S32.591A: Status: ACTIVE | Noted: 2022-07-05

## 2022-07-05 PROBLEM — Z95.0 S/P PLACEMENT OF CARDIAC PACEMAKER: Status: ACTIVE | Noted: 2022-07-05

## 2022-07-05 PROBLEM — I25.10 CAD (CORONARY ARTERY DISEASE): Status: ACTIVE | Noted: 2022-07-05

## 2022-07-05 PROBLEM — S00.03XA HEMATOMA OF RIGHT PARIETAL SCALP: Status: ACTIVE | Noted: 2022-07-05

## 2022-07-05 PROBLEM — S42.021A CLOSED DISPLACED FRACTURE OF SHAFT OF RIGHT CLAVICLE: Status: ACTIVE | Noted: 2022-07-05

## 2022-07-05 PROBLEM — D69.6 THROMBOCYTOPENIA (H): Status: ACTIVE | Noted: 2022-07-05

## 2022-07-05 PROBLEM — S32.511A: Status: ACTIVE | Noted: 2022-07-05

## 2022-07-05 LAB
MAGNESIUM SERPL-MCNC: 1.7 MG/DL (ref 1.8–2.6)
POTASSIUM BLD-SCNC: 4.1 MMOL/L (ref 3.5–5)

## 2022-07-05 PROCEDURE — 83735 ASSAY OF MAGNESIUM: CPT | Performed by: INTERNAL MEDICINE

## 2022-07-05 PROCEDURE — 97167 OT EVAL HIGH COMPLEX 60 MIN: CPT | Mod: GO

## 2022-07-05 PROCEDURE — 250N000013 HC RX MED GY IP 250 OP 250 PS 637

## 2022-07-05 PROCEDURE — 99223 1ST HOSP IP/OBS HIGH 75: CPT | Mod: AI

## 2022-07-05 PROCEDURE — 36415 COLL VENOUS BLD VENIPUNCTURE: CPT | Performed by: INTERNAL MEDICINE

## 2022-07-05 PROCEDURE — 97535 SELF CARE MNGMENT TRAINING: CPT | Mod: GO

## 2022-07-05 PROCEDURE — 97162 PT EVAL MOD COMPLEX 30 MIN: CPT | Mod: GP

## 2022-07-05 PROCEDURE — 120N000001 HC R&B MED SURG/OB

## 2022-07-05 PROCEDURE — G1010 CDSM STANSON: HCPCS

## 2022-07-05 PROCEDURE — 97530 THERAPEUTIC ACTIVITIES: CPT | Mod: GP

## 2022-07-05 PROCEDURE — 84132 ASSAY OF SERUM POTASSIUM: CPT | Performed by: INTERNAL MEDICINE

## 2022-07-05 PROCEDURE — 250N000011 HC RX IP 250 OP 636

## 2022-07-05 RX ORDER — SPIRONOLACTONE 25 MG/1
25 TABLET ORAL DAILY
Status: CANCELLED | OUTPATIENT
Start: 2022-07-06

## 2022-07-05 RX ORDER — LIDOCAINE 50 MG/G
OINTMENT TOPICAL EVERY 4 HOURS PRN
Status: DISCONTINUED | OUTPATIENT
Start: 2022-07-05 | End: 2022-07-07 | Stop reason: HOSPADM

## 2022-07-05 RX ORDER — CALCITONIN SALMON 200 [IU]/.09ML
1 SPRAY, METERED NASAL DAILY
Status: DISCONTINUED | OUTPATIENT
Start: 2022-07-05 | End: 2022-07-07 | Stop reason: HOSPADM

## 2022-07-05 RX ORDER — OXYCODONE HYDROCHLORIDE 5 MG/1
5 TABLET ORAL EVERY 4 HOURS PRN
Status: DISCONTINUED | OUTPATIENT
Start: 2022-07-05 | End: 2022-07-05

## 2022-07-05 RX ADMIN — ATORVASTATIN CALCIUM 20 MG: 10 TABLET, FILM COATED ORAL at 20:59

## 2022-07-05 RX ADMIN — SENNOSIDES AND DOCUSATE SODIUM 1 TABLET: 50; 8.6 TABLET ORAL at 20:59

## 2022-07-05 RX ADMIN — GABAPENTIN 300 MG: 300 CAPSULE ORAL at 20:59

## 2022-07-05 RX ADMIN — CALCITONIN SALMON 1 SPRAY: 200 SPRAY, METERED NASAL at 16:59

## 2022-07-05 RX ADMIN — SPIRONOLACTONE 25 MG: 25 TABLET, FILM COATED ORAL at 08:58

## 2022-07-05 RX ADMIN — ACETAMINOPHEN 975 MG: 325 TABLET ORAL at 17:00

## 2022-07-05 RX ADMIN — OXYCODONE HYDROCHLORIDE 5 MG: 5 TABLET ORAL at 14:39

## 2022-07-05 RX ADMIN — ACETAMINOPHEN 975 MG: 325 TABLET ORAL at 01:28

## 2022-07-05 RX ADMIN — ENOXAPARIN SODIUM 40 MG: 100 INJECTION SUBCUTANEOUS at 22:17

## 2022-07-05 RX ADMIN — Medication 950 MG: at 08:58

## 2022-07-05 RX ADMIN — OXYCODONE HYDROCHLORIDE 5 MG: 5 TABLET ORAL at 06:49

## 2022-07-05 ASSESSMENT — ACTIVITIES OF DAILY LIVING (ADL)
ADLS_ACUITY_SCORE: 36
ADLS_ACUITY_SCORE: 40
ADLS_ACUITY_SCORE: 36
PREVIOUS_RESPONSIBILITIES: MEAL PREP;HOUSEKEEPING;LAUNDRY;SHOPPING;MEDICATION MANAGEMENT
ADLS_ACUITY_SCORE: 40
ADLS_ACUITY_SCORE: 36

## 2022-07-05 NOTE — PLAN OF CARE
Problem: Plan of Care - These are the overarching goals to be used throughout the patient stay.    Goal: Optimal Comfort and Wellbeing  Outcome: Ongoing, Progressing     Problem: Pain (Orthopaedic Fracture)  Goal: Acceptable Pain Control  Outcome: Ongoing, Progressing   Goal Outcome Evaluation:      Pt A/O X 4. VSS. Reports pain in R clavicle, chest  and Pelvic. Some relief with PRN oxycodone, ice, and rest. Will continue monitor.

## 2022-07-05 NOTE — CONSULTS
ORTHOPEDIC CONSULTATION    Consultation  Helena Moon,  1940, MRN [id number]    [unfilled]  Multiple closed pelvic fractures with disruption of pelvic [place], initial encounter (H) [D82.645A]    PCP: Brittany Man, [phone]   Code status:  Full Code       Extended Emergency Contact Information  Primary Emergency Contact: SEAN MOON  Address: [address] [place] [id number] [employer]  Home Phone: [phone]  Relation: Spouse         IMPRESSION:  1. Right superior and inferior pubic rami fractures  2. Possible Right sacral ala fracture  3. Right mildly comminuted midshaft clavicle fracture     PLAN:  This patient was discussed with Dr. Connell, on-call surgeon for [facility] and they are in agreement with the following plan.     Pelvic fractures & possible sacral ala fractures:  -Reviewed xray findings with patient that shows Right superior and inferior pubic rami fractures as well as a possible Right sacral fracture. Will order a CT bony pelvis to better evaluate her fractures given that she could have an unstable pelvic fracture.  -Non-weightbearing bilateral lower extremities for now until CT scan complete.  -Pain control. Continue oxycodone & Tylenol.    Right clavicle fracture:  -Reviewed xray findings with patient that shows a midshaft fracture. No surgical intervention indicated at this time.  -NWB Right upper extremity.  -Continue sling.  -Follow up with ortho as outpatient in 2-3 weeks.  -Pain control. Continue oxycodone and Tylenol.     Thank you for including [facility] in the care of Helena Moon. It has been a pleasure participating in Helena Moon's care.    CHIEF COMPLAINT: Multiple closed pelvic fractures with disruption of pelvic [place], initial encounter (H)    HISTORY OF PRESENT ILLNESS:  The patient is seen in orthopedic consultation at the request of Dr. Campuzano.  The patient is a 82 year old female who presented to the ED with Right  shoulder and Right hip/groin pain after falling at home. She was vacuuming her stairs and fell. She did hit her head but did not loose consciousness. Xrays revealed a Right clavicle fracture and a superior & inferior pelvic rami fracture with possible right sacral ala fracture. Pain is currently well controlled. She denies any numbness or tingling in her Right arm, hand or fingers. She denies any pain or tingling in her bilateral lower extremities. She denies any current bowel or bladder incontinence. She does have a history of colitis and does get bowel incontinence with that, but none currently.    PAST MEDICAL HISTORY:  Past Medical History:   Diagnosis Date     Anxiety      Arthritis      Asthma      Colitis     Collagenous     Complete heart block (H)      Hypertriglyceridemia      Macular degeneration      Osteopenia      Osteoporosis      Pacemaker      Pulmonary embolism (H)      Restless legs      Skin cancer, basal cell      ALLERGIES:   Review of patient's allergies indicates   Allergies   Allergen Reactions     Nsaids Other (See Comments)     Patient has history of microcolitis.  GI aggravation     Sulfa Drugs      Made her eyes stingy- it was eyedrops  Eye drops caused burning     MEDICATIONS UPON ADMISSION:  Medications were reviewed.  They include:   Medications Prior to Admission   Medication Sig Dispense Refill Last Dose     acetaminophen (TYLENOL) 500 MG tablet Take 500-1,000 mg by mouth every 6 hours as needed   Past Week at Unknown time     atorvastatin (LIPITOR) 20 MG tablet Take 20 mg by mouth At Bedtime   7/3/2022 at Unknown time     calcium citrate (CITRACAL) 950 (200 Ca) MG tablet Take 1 tablet by mouth daily   7/4/2022 at Unknown time     Cholecalciferol (VITAMIN D3) 1000 units CAPS Take 1,000 Units by mouth daily.   7/4/2022 at Unknown time     cyanocobalamin (VITAMIN B-12) 100 MCG tablet Take 100 mcg by mouth daily   7/4/2022 at Unknown time     gabapentin (NEURONTIN) 300 MG capsule Take  300 mg by mouth At Bedtime   7/3/2022 at Unknown time     spironolactone (ALDACTONE) 25 MG tablet Take 25 mg by mouth daily   7/4/2022 at Unknown time     SOCIAL HISTORY:   she  reports that she quit smoking about 61 years ago. She has a 1.00 pack-year smoking history. She has never used smokeless tobacco. She reports current alcohol use of about 7.0 standard drinks of alcohol per week. She reports that she does not use drugs.    FAMILY HISTORY:  family history includes Alzheimer Disease in her father; Breast Cancer in her cousin; Heart Disease in her mother.    REVIEW OF SYSTEMS:   Reviewed with patient. See HPI, otherwise negative     PHYSICAL EXAMINATION:  Vitals: Temp:  [97.3  F (36.3  C)-98.4  F (36.9  C)] 98.4  F (36.9  C)  Pulse:  [60-66] 61  Resp:  [16] 16  BP: ()/(50-78) 104/61  SpO2:  [93 %-97 %] 93 %  General: On examination, the patient is resting comfortably, NAD, awake and alert and oriented to person, place and time   SKIN: Skin is intact overlying Right clavicle. Ecchymosis superior and lateral to clavicle. Skin intact over Right pelvis.   Pulses:  Palpable bilateral radial and dorsalis pedis pulses.  Sensation: Sensation intact & equal in bilateral upper extremity, forearm, hand and fingers. Sensation intact in lower extremities including circumferential thighs and calfs, as well as feet and toes.  Tenderness: TTP overlying Right clavicle, Right groin. No tenderness over cervical spine.  Chest: Pacemaker noted in Left chest.  ROM: Moves all fingers and toes bilaterally. Right elbow flexion and extension, supination & pronation intact and without pain.  Motor:  UE: +5/5 in bilateral  strength, wrist extension, wrist flexion, finger abduction. Hitchhiker and okay sign intact bilaterally in upper extremities.   LE: +5/5 in hip flexion, bilateral ankle PF/DF, EHL.    RADIOGRAPHIC EVALUATION:  Personally reviewed    Right shoulder xray:   Acute mildly comminuted fracture of the midshaft of the  clavicle with apex superior angulation and mild superior displacement of the distal fragment. [place] in the humeral head. No dislocation. Mild degenerative arthritis of the glenohumeral joint.     Pelvic fracture: Acute displaced fracture of the right superior pubic ramus laterally along with femoroacetabular junction. Acute mildly displaced fracture of the right inferior pubic ramus. Mild deformity right sacral ala could represent fracture. No   proximal femur fracture. Mild degenerative arthritis right hip joint. Degenerative changes lumbar spine.    PERTINENT LABS:  Lab Results: personally reviewed.   No visits with results within 2 Month(s) from this visit.   Latest known visit with results is:   Ancillary Procedure on 03/23/2022   Component Date Value     Date Time Interrogation * 03/23/2022 20220323021300      Implantable Pulse Genera* 03/23/2022 [employer]      Implantable Pulse Genera* 03/23/2022 U128 VALITUDE      Implantable Pulse Genera* 03/23/2022 [id number]      Type Interrogation Sessi* 03/23/2022 Remote       Clinic Name 03/23/2022 [facility]      Implantable Pulse Genera* 03/23/2022 Cardiac Resynchronization Therapy - Pacemaker      Implantable Pulse Genera* 03/23/2022 [id number]      Implantable Lead Manufac* 03/23/2022 [employer]      Implantable Lead Model 03/23/2022 4672 Acuity X4 Straight      Implantable Lead Serial * 03/23/2022 [id number]      Implantable Lead Implant* 03/23/2022 [id number]      Implantable Lead Polarit* 03/23/2022 Quadripolar Lead      Implantable Lead Locatio* 03/23/2022 UNKNOWN      Implantable Lead Special* 03/23/2022 95cm      Implantable Lead Location 03/23/2022 Left Ventricle      Implantable Lead Manufac* 03/23/2022 [employer]      Implantable Lead Model 03/23/2022 7740 Ingevity MRI      Implantable Lead Serial * 03/23/2022 [id number]      Implantable Lead Implant* 03/23/2022 [id number]      Implantable Lead Polarit* 03/23/2022 Bipolar Lead       Implantable Lead Locatio* 03/23/2022 UNKNOWN      Implantable Lead Special* 03/23/2022 45cm      Implantable Lead Location 03/23/2022 Right Atrium      Implantable Lead Manufac* 03/23/2022 [employer]      Implantable Lead Model 03/23/2022 7741 Ingevity MRI      Implantable Lead Serial * 03/23/2022 235275      Implantable Lead Implant* 03/23/2022 [id number]      Implantable Lead Polarit* 03/23/2022 Bipolar Lead      Implantable Lead Locatio* 03/23/2022 UNKNOWN      Implantable Lead Special* 03/23/2022 52cm      Implantable Lead Location 03/23/2022 Right Ventricle      Roland Setting Mode (NBG * 03/23/2022 DDDR      Roland Setting Lower Rate* 03/23/2022 60      Roland Setting Maximum Tr* 03/23/2022 130      Roland Setting Maximum Se* 03/23/2022 130      Roland Setting JASON Delay * 03/23/2022 100      Roland Setting PAV Delay * 03/23/2022 130      Roland Setting PAV Delay * 03/23/2022 110      Roland Setting JASON Delay * 03/23/2022 85      Roland Setting AT Mode Sw* 03/23/2022 170      Roland Setting AT Mode Sw* 03/23/2022 DDIR      Lead Channel Setting Sen* 03/23/2022 Bipolar      Lead Channel Setting Sen* 03/23/2022 0.25      Lead Channel Setting Sen* 03/23/2022 Adaptive      Lead Channel Setting Sen* 03/23/2022 Bipolar      Lead Channel Setting Sen* 03/23/2022 1.5      Lead Channel Setting Sen* 03/23/2022 Adaptive      Lead Channel Setting Sen* 03/23/2022 Left Ventricle      Lead Channel Setting Sen* 03/23/2022 Ring2      Lead Channel Setting Sen* 03/23/2022 Left Ventricle      Lead Channel Setting Sen* 03/23/2022 Tip      Lead Channel Setting Sen* 03/23/2022 1.5      Lead Channel Setting Sen* 03/23/2022 Adaptive      Ventricular chambers pac* 03/23/2022 BiV      CRT LV-RV Delay 03/23/2022 0      Lead Channel Setting Pac* 03/23/2022 Bipolar      Lead Channel Setting Pac* 03/23/2022 0.4      Lead Channel Setting Pac* 03/23/2022 1.5      Lead Channel Setting Pac* 03/23/2022 Monitor      Lead Channel Setting Pac* 03/23/2022  Bipolar      Lead Channel Setting Pac* 03/23/2022 0.4      Lead Channel Setting Pac* 03/23/2022 2.0      Lead Channel Setting Pac* 03/23/2022 Adaptive      Lead Channel Setting Pac* 03/23/2022 Other      Lead Channel Setting Pac* 03/23/2022 Can      Lead Channel Setting Sen* 03/23/2022 Left Ventricle      Lead Channel Setting Sen* 03/23/2022 Ring3      Lead Channel Setting Pac* 03/23/2022 0.9      Lead Channel Setting Pac* 03/23/2022 2.8      Zone Setting Type Catego* 03/23/2022 VT      Zone Setting Vendor Type* 03/23/2022 VT      Zone Setting Detection I* 03/23/2022 375      Lead Channel Impedance V* 03/23/2022 753      Lead Channel Pacing Thre* 03/23/2022 1.7      Lead Channel Pacing Thre* 03/23/2022 0.9      Lead Channel Impedance V* 03/23/2022 649      Lead Channel Pacing Thre* 03/23/2022 0.5      Lead Channel Pacing Thre* 03/23/2022 0.4      Lead Channel Impedance V* 03/23/2022 795      Lead Channel Pacing Thre* 03/23/2022 0.7      Lead Channel Pacing Thre* 03/23/2022 0.4      Battery Date Time of Rosa* 03/23/2022 20220323021300      Battery Status 03/23/2022 Beginning of Service      Battery Remaining Longev* 03/23/2022 90      Battery Remaining Percen* 03/23/2022 100      Roland Statistic Date Roni* 03/23/2022 20210830000000      Roland Statistic Date Roni* 03/23/2022 20220323000000      Roland Statistic RA Perce* 03/23/2022 21      Roland Statistic RV Perce* 03/23/2022 100      CRT Statistic LV Percent* 03/23/2022 100      CRT Statistic Date Time * 03/23/2022 20210830000000      CRT Statistic Date Time * 03/23/2022 20220323000000      Atrial Tachy Statistic D* 03/23/2022 20210901000000      Atrial Tachy Statistic D* 03/23/2022 20220322000000      Atrial Tachy Statistic A* 03/23/2022 0      Episode Statistic Recent* 03/23/2022 0      Episode Statistic Type C* 03/23/2022 AT/AF      Episode Statistic Vendor* 03/23/2022 AF      Episode Statistic Recent* 03/23/2022 0      Episode Statistic Type C* 03/23/2022 Other       Episode Statistic Recent* 03/23/2022 0      Episode Statistic Type C* 03/23/2022 SVT      Episode Statistic Vendor* 03/23/2022 SVT      Episode Statistic Recent* 03/23/2022 1      Episode Statistic Type C* 03/23/2022 VT      Episode Statistic Vendor* 03/23/2022 NSVT      Episode Statistic Recent* 03/23/2022 0      Episode Statistic Type C* 03/23/2022 VT      Episode Statistic Vendor* 03/23/2022 VT      Episode Statistic Recent* 03/23/2022 [id number]      Episode Statistic Recent* 03/23/2022 [id number]      Episode Statistic Recent* 03/23/2022 [id number]      Episode Statistic Recent* 03/23/2022 61473589241150      Episode Statistic Recent* 03/23/2022 [id number]      Episode Statistic Recent* 03/23/2022 57703963730714      Episode Statistic Recent* 03/23/2022 [id number]      Episode Statistic Recent* 03/23/2022 58298735654383      Episode Statistic Recent* 03/23/2022 [id number]      Episode Statistic Recent* 03/23/2022 [id number]      Episode Identifier 03/23/2022 APM-15      Episode Type Category 03/23/2022 Periodic EGM      Episode Date Time 03/23/2022 20220323020200      Episode Identifier 03/23/2022 [email or web address]      Episode Type Category 03/23/2022 Other      Episode Date Time 03/23/2022 20220323013600      Episode Identifier 03/23/2022 [email or web address]      Episode Type Category 03/23/2022 Other      Episode Date Time 03/23/2022 20220323013500      Episode Identifier 03/23/2022 PMT-17      Episode Type Category 03/23/2022 Other      Episode Date Time 03/23/2022 [id number]      Episode Identifier 03/23/2022 V-3      Episode Type Category 03/23/2022 VT      Episode Date Time 03/23/2022 [id number]      Episode Duration 03/23/2022 14      Episode Identifier 03/23/2022 APM-14      Episode Type Category 03/23/2022 Periodic EGM      Episode Date Time 03/23/2022 [id number]        Carolyn Machado PA-C, WHITNEY  Date: 7/5/2022  Time: 2:19 PM    CC1:   Cecelia Campuzano MD    CC2:   Brittany Man  V      I saw and evaluate the patient.  I obtained a CT scan of the bony pelvis.  She has an LC 1 complete injury to the right hemipelvis.  She also has a lateral rim nondisplaced acetabular fracture.  I reviewed the scan with the patient.  I also reviewed the scan with one of my colleagues who is an Ortho traumatologist.  Her fracture pattern is stable.  I will plan for nonoperative care.  I recommend weight-bear as tolerated with a walker to her bilateral lower extremities.  She should remain platform weightbearing with the right upper extremity.  She should mobilize as tolerated physical therapy.  I will see her back in 2 weeks time for repeat x-rays of the pelvis.  Patient will likely need TCU placement.  If there are significant displacement or inability to mobilize.    KARLOS CORNELL MD

## 2022-07-05 NOTE — PROGRESS NOTES
Resident/Fellow Attestation   I, Tutu Mittal, was present with the medical/SHELIA student who participated in the service and in the documentation of the note.  I have verified the history and personally performed the physical exam and medical decision making.  I agree with the assessment and plan of care as documented in the note.        Tutu Mittal, DO  PGY1  Date of Service 7/5/2022    [facility]    Progress Note - Hospitalist Service       Date of Admission:  7/4/2022    Assessment & Plan          Helena Brown is a 82 year old female with past medical history significant for PE 2019 (no anticoagulation presently), CAD s/p pacemaker for complete heart block 2018, and osteoporosis admitted on 7/4/2022 with pain 2/2 right superior and inferior rami fxs, right sacral ala fx, mildly displaced right midshaft clavicle fracture.    Right superior pubic ramus fx  Right inferior pubic ramus fx, mildly displaced  Sacral ala fx   Right midshaft clavicle fx, mildly displaced  Fall, mechanical   Patient fell while vacuuming stairs, no cardiac or neurologic symptoms prior to fall. At baseline on presentation.   - PT consulted, recommended discharge to TCU pending further evaluation    - LLE/RLE weight bearing as tolerated   - RUE non-weight bearing, sling   - PT/OT consulted, appreciate recs  - Ortho consulted, appreciate recs  - Tylenol scheduled TID  - Oxycodone 2.5 mg q4h PRN  - 5% lidocaine cream, hot pack, cold pack all available PRN for discomfort/pain relief    Right parietal scalp hematoma   - Keep scalp wound clean and dry    Osteoporosis   Last DEXA 9/18/2018. Recommend bisphosphonate initiation 2 weeks post-fx.   - Start calcitonin nasal spray for 2-4 wks  - Citracal 950mg daily    Thrombocytopenia, mild   PLTs 132 on admission, baseline 170-200.   - Repeat CBC in the morning    Chronic conditions:  CAD s/p pacemaker 2018 - PTA lipitor, holding aldactone in the setting of low  BP  Osteoarthritis - PTA tylenol PRN   Colitis - avoid NSAIDs  RLS - PTA gabapentin      Diet: Combination Diet Regular Diet Adult    DVT Prophylaxis: Enoxaparin (Lovenox) SQ  Warren Catheter: Not present  Fluids: PO  Central Lines: None  Cardiac Monitoring: None  Code Status: Full Code      Disposition Plan   Expected discharge: 1-2 days     The patient's care was discussed with the Attending Physician, Dr. Cecelia Campuzano.    Hailey Jackson, MS4  [employer]    ______________________________________________________________________    Interval History   ED, nursing, and H&P notes reviewed. No acute events since presentation.     Patient reports pain has improved since presentation yesterday. She does not report any new symptoms. She denies shortness of breath, abdominal pain, worsening joint pain, increased swelling, rash, unexplained bleeding/bruising.     Data reviewed today: I reviewed all medications, new labs and imaging results over the last 24 hours. I personally reviewed the head CT image(s) showing right parietal scalp hematoma and the cervical spine CT image(s) showing no acute abnormalities, shoulder XR with mildly displaced midshaft communited fracture and hip/pelvis XR with right superior/inferious rami and sacral ala fractures.    Physical Exam   Vital Signs: Temp: 98.4  F (36.9  C) Temp src: Oral BP: (!) 87/52 Pulse: 61   Resp: 16 SpO2: 93 % O2 Device: None (Room air)    Weight: 106 lbs 0 oz  Constitutional: awake, alert, cooperative, no apparent distress, and appears stated age  Eyes: Lids and lashes normal, pupils equal, sclera clear, conjunctiva normal  ENT: normocephalic, bandage over parietal scalp hematoma, no bleeding or drainage appreciated  Hematologic: no obvious petechial rash or unexplained bruising  Respiratory: no increased work of breathing, good air exchange, clear to auscultation bilaterally, no crackles or wheezing  Cardiovascular: regular rate and rhythm,  normal S1 and S2, no S3 or S4, and no murmur noted  GI: normal bowel sounds, soft, non-distended, non-tender  Musculoskeletal: tone is normal  RIGHT SHOULDER: swelling present, tenderness present, active range of motion decreased  Neurologic: Awake, alert, oriented to name, place and time.  Cranial nerves II-XII are grossly intact.   Neuropsychiatric: General: normal, calm and normal eye contact  Level of consciousness: alert / normal  Affect: normal    Data   Recent Labs   Lab 07/05/22  0420 07/04/22  2127   WBC  --  9.5   HGB  --  12.2   MCV  --  91   PLT  --  132*   NA  --  138   POTASSIUM 4.1 3.9   CHLORIDE  --  105   CO2  --  24   BUN  --  19   CR  --  0.86   ANIONGAP  --  9   MARYANN  --  9.1   GLC  --  133*     Recent Results (from the past 24 hour(s))   Head CT w/o contrast    Narrative    EXAM: CT HEAD W/O CONTRAST  LOCATION: [facility]  DATE/TIME: 7/4/2022 6:35 PM    INDICATION: Head injury.  COMPARISON: None.  TECHNIQUE: Routine CT Head without IV contrast. Multiplanar reformats. Dose reduction techniques were used.    FINDINGS:  INTRACRANIAL CONTENTS: No intracranial hemorrhage, extraaxial collection, or mass effect.  No CT evidence of acute infarct. Mild presumed chronic small vessel ischemic changes. Mild generalized volume loss. No hydrocephalus.     VISUALIZED ORBITS/SINUSES/MASTOIDS: Prior bilateral cataract surgery. Visualized portions of the orbits are otherwise unremarkable. No paranasal sinus mucosal disease. No middle ear or mastoid effusion.    BONES/SOFT TISSUES: Right parietal scalp hematoma. No acute calvarial abnormality.      Impression    IMPRESSION:  1.  No acute intracranial hemorrhage.  2.  Right parietal scalp hematoma without underlying calvarial fracture.   Cervical spine CT w/o contrast    Addendum: 7/4/2022    Addendum:    There is an acute fracture of the mid aspect of the right clavicle.      Narrative    EXAM: CT CERVICAL SPINE W/O CONTRAST  LOCATION:   [facility]  DATE/TIME: 7/4/2022 6:35 PM    INDICATION: Neck pain.  COMPARISON: None.  TECHNIQUE: Routine CT Cervical Spine without IV contrast. Multiplanar reformats. Dose reduction techniques were used.    FINDINGS:  VERTEBRA: Osteopenia. Cervical vertebral body heights are maintained. Grade 1 anterolisthesis C2 on C3, C3 on C4, and C7 on T1. Severe degenerative changes at C1-C2. No acute cervical spine fracture.      CANAL/FORAMINA: No canal or neural foraminal stenosis.    PARASPINAL: No extraspinal abnormality.      Impression    IMPRESSION:  1.  No acute cervical spine fracture.   XR Shoulder Right 2 Views    Addendum: 7/4/2022    On the prior report the impression was was interosseous has chronic into the indication feels. Corrected report follows.    EXAM: XR SHOULDER 2 VIEW RIGHT  LOCATION: [facility]  DATE/TIME: 7/4/2022 6:47 PM    INDICATION: Injury, pain   COMPARISON: None.    IMPRESSION: Acute mildly comminuted fracture of the midshaft of the clavicle with apex superior angulation and mild superior displacement of the distal fragment.    [place] in the humeral head. No dislocation. Mild degenerative arthritis of the glenohumeral joint.         Narrative    EXAM: XR SHOULDER 2 VIEW RIGHT  LOCATION: [facility]  DATE/TIME: 7/4/2022 6:47 PM    INDICATION: Injury, pain acute mildly comminuted fracture of the midshaft of the clavicle with apex superior angulation and mild superior displacement of the distal fragment.    [place] in the humeral head. No dislocation. Mild degenerative arthritis of the glenohumeral joint.  COMPARISON: None.      Impression    IMPRESSION: Normal joint spaces and alignment. No fracture.    XR Pelvis and Hip Right 2 Views    Narrative    EXAM: XR PELVIS AND HIP RIGHT 2 VIEWS  LOCATION: [facility]  DATE/TIME: 7/4/2022 6:48 PM    INDICATION: Injury, pain  COMPARISON: None.       Impression    IMPRESSION: Acute displaced fracture of the right superior pubic ramus laterally along with femoroacetabular junction. Acute mildly displaced fracture of the right inferior pubic ramus. Mild deformity right sacral ala could represent fracture. No   proximal femur fracture. Mild degenerative arthritis right hip joint. Degenerative changes lumbar spine.    Pelvic calcifications suggestive of calcified uterine fibroids.     Medications       acetaminophen  975 mg Oral Q8H     atorvastatin  20 mg Oral At Bedtime     calcium citrate  950 mg Oral Daily     enoxaparin ANTICOAGULANT  40 mg Subcutaneous Q24H     gabapentin  300 mg Oral At Bedtime     senna-docusate  1 tablet Oral BID    Or     senna-docusate  2 tablet Oral BID     sodium chloride (PF)  3 mL Intracatheter Q8H     spironolactone  25 mg Oral Daily

## 2022-07-05 NOTE — PLAN OF CARE
Problem: Functional Ability Impaired (Orthopaedic Fracture)  Goal: Optimal Functional Ability  Outcome: Ongoing, Progressing     Problem: Pain (Orthopaedic Fracture)  Goal: Acceptable Pain Control  Outcome: Ongoing, Progressing    Pt alert and oriented x 4. VSS. Denies SOB, chest pain and N/V. Reports R clavicle and pelvic pain. Pain being controlled with ice and rest. Tolerating regular diet. Purewick place @ . Bedrest with commode privileges. Alarms on for safety.

## 2022-07-05 NOTE — PROGRESS NOTES
07/05/22 1000   Quick Adds   Type of Visit Initial PT Evaluation  (Hospitalist shelly with mobility prior to ortho consult: NWB R UE, WBAT LEs)   Living Environment   People in Home spouse;child(talib), adult  (Daughter)   Current Living Arrangements house   Home Accessibility stairs to enter home;stairs within home   Number of Stairs, Main Entrance 6   Stair Railings, Main Entrance railings safe and in good condition   Number of Stairs, Within Home, Primary six   Stair Railings, Within Home, Primary railings safe and in good condition   Self-Care   Equipment Currently Used at Home none   Fall history within last six months yes   Number of times patient has fallen within last six months   (1)   Activity/Exercise/Self-Care Comment has FWW and SEC at home. Patient ambulating and negotiating stairs independently prior to fall   General Information   Onset of Illness/Injury or Date of Surgery 07/04/22   Referring Physician Dr. Cecelia Campuzano   Patient/Family Therapy Goals Statement (PT) be able to move safely   Pertinent History of Current Problem (include personal factors and/or comorbidities that impact the POC) Fall on stairs, R clavicle fx, pelvic fx   Weight-Bearing Status - RUE nonweight-bearing  (sling)   Weight-Bearing Status - LLE weight-bearing as tolerated   Weight-Bearing Status - RLE weight-bearing as tolerated   Bed Mobility   Bed Mobility supine-sit   Supine-Sit [place] (Bed Mobility) minimum assist (75% patient effort);verbal cues   Impairments Contributing to Impaired Bed Mobility pain;decreased strength   Assistive Device (Bed Mobility) bed rails;draw sheet   Transfers   Transfers sit-stand transfer   Maintains Weight-bearing Status (Transfers) verbal cues to maintain   Sit-Stand Transfer   Sit-Stand [place] (Transfers) minimum assist (75% patient effort);verbal cues   Assistive Device (Sit-Stand Transfers)   (bed rail)   Gait/Stairs (Locomotion)   [place] Level (Gait) minimum assist (75%  patient effort);verbal cues   Assistive Device (Gait) walker, gabriel   Distance in Feet (Required for LE Total Joints) SPT left   Pattern (Gait) step-to   Deviations/Abnormal Patterns (Gait) gait speed decreased  (flexed posture)   Maintains Weight-bearing Status (Gait) able to maintain   Clinical Impression   Criteria for Skilled Therapeutic Intervention Yes, treatment indicated   PT Diagnosis (PT) Impaired functional mobility   Influenced by the following impairments weakness, pain   Functional limitations due to impairments transfers, bed mobility, gait   Clinical Presentation (PT Evaluation Complexity) Evolving/Changing   Clinical Presentation Rationale Pt presents as medically diagnosed   Clinical Decision Making (Complexity) moderate complexity   Planned Therapy Interventions (PT) bed mobility training;gait training;home exercise program;patient/family education;strengthening;transfer training;stair training   Anticipated Equipment Needs at Discharge (PT) walker, gabriel;cane, straight   Risk & Benefits of therapy have been explained evaluation/treatment results reviewed;care plan/treatment goals reviewed;patient   PT Discharge Planning   PT Discharge Recommendation (DC Rec) (S)  Transitional Care Facility   PT Rationale for DC Rec may be able to go home pending stair negotiation and ambulation. has support at home, very active prior   Total Evaluation Time   Total Evaluation Time (Minutes) 10   Physical Therapy Goals   PT Frequency Daily   PT Predicted Duration/Target Date for Goal Attainment 07/12/22   PT Goals Gait;Transfers;Stairs   PT: Transfers Modified independent;Sit to/from stand;Assistive device   PT: Gait Modified independent;Gabriel walker;50 feet   PT: Stairs 6 stairs;Minimal assist

## 2022-07-05 NOTE — H&P
Admission History and Physical   Helena Moon,  1940, MRN [id number]    [facility]  Multiple closed pelvic fractures with disruption of pelvic [place], initial encounter (H) [S32.810A]    PCP: Brittany Man, [phone]   Code status:  Full Code       Extended Emergency Contact Information  Primary Emergency Contact: SEAN MOON  Address: [address] [place] [id number] [facility]  Home Phone: [phone]  Relation: Spouse       Chief Complaint: Fall, right hip pain     Assessment and Plan:   Helena Moon is a 82 year old female with a past medical history of PE in 2019, no longer on anticoagulation, CAD, s/p pacemaker placement for complete heart block and symptomatic bradycardia in 2018, who presented to the [facility] Emergency Department on the day of admission with complaints of right hip and groin pain after falling at home, found to have 3 pelvic fractures on pelvic xray, and right clavicle fracture.     Superior Ramus fracture, right pelvis  Inferior Ramus fracture, right mildly displaced  Mild sacral Ala fracture  Clavicle fracture, mildly displaced  Scalp Hematoma  Fall, mechanical   Patient reports she lost her balance while vacuuming on stairs. Fall is likely mechanical in nature. She does have cardiac hx but denies cardiac or neurologic symptoms prior to fall. She was at her baseline. No recent additional falls. She does have history of osteoporosis on Dexa scan in 2019.   - Admit to Med/surg  - Weight bearing as tolerated  - Consider ortho consult in AM  - PT/OT consult  - Pain Management   - Tylenol Scheduled TID   - Oxycodone 5 mg Q4h PRN  - Keep scalp wound clean and dry  - CBC, BMP    S/p Pacemaker 2018  CAD  Last ECHO in 2021 with an EF of 60%. Pacemaker last interogated at that time and per chart review was working 100% correctly. Patient denies lightheadedness, dizziness, palpations, chest pain or other to indicate cardiac etiology of her fall.   -  "Continue PTA Aldactone 25 mg daily  - Continue PTA Lipitor    Uterine Fibroids on Xray  - Follow up with PCP outpatient     Thrombocytopenia  Platelets 132 on admission. Previously 170-200's.   - CBC    Chronic Conditions:  CAD: Continue ASA, Lipitor  Arthritis: Continue Tylenol PRN  Colitis: Avoid NSAIDs    FEN:  Fluids: Diet: Regular   PPX: Lovenox   Disposition: 1-2 days pending PT recs and pain control   Status: Inpatient     Patient discussed with overnight attending, Dr. Emeka Mccallum. Patient will be seen by Dr. Campuzano in the morning.     Wendi Bell MD PGY2 7/4/2022  [facility] Family Medicine Residency Program  Pager: [phone] (from 8AM-5:30PM)  Please call the senior pager with any questions or concerns, 24/7: [phone].    HPI:    Helena Brown is a 82 year old female with a past medical history of PE in 2019, no longer on anticoagulation, CAD, s/p pacemaker placement for complete heart block and symptomatic bradycardia in 2018, who presented to the [facility] Emergency Department on the day of admission with complaints of right hip and groin pain after falling at home.     Ms Brown was vacuuming her stairs around 4 PM today when she lost her balance and fell backwards, landing on right side of head, right shoulder and right hip. She feels she \"just lost my balance.\" She thinks she fell 2-3 stairs. She did not lose consciousness. When she got up to walk, she had a lot of right groin pain, and noted a large bruise/bump on her right shoulder/clavicle. She denies dizziness, lightheadedness, blacking out, chest pain, shortness of breath, palpitations, or any new symptoms prior to the fall. She was feeling well and in her usual state of health until she fell. She has not had any other recent falls.     On arrival to the ED, she was hemodynamically stable and alert at baseline. Head CT was negative for intracranial hemorrhage, or calvarial fracture. CT spine negative for " "fracture. Xray of right shoulder was negative. Right shoulder xray showed right mildly displaced clavicle fracture and pelvic xray showed 3 fractures and possible uterine fibroids. No additional labs were indicated at this time. Pain was controlled with oral medications, but due to difficulty walking, she will be admitted for further evaluation.     History is provided by patient, who is a accurate historian.      Emergency Department Course:    Upon presentation to the Emergency Department the patient's vital signs were    ED Triage Vitals [07/04/22 1742]   Enc Vitals Group      BP (!) 140/78      Pulse 60      Resp 16      Temp 97.6  F (36.4  C)      Temp src Temporal      SpO2 97 %      Weight 48.1 kg (106 lb)      Height 1.575 m (5' 2\")      Head Circumference       Peak Flow       Pain Score       Pain Loc       Pain Edu?       Excl. in GC?        Medical History  PE, likely provoked in setting of post hospitalization for pacemaker placement, was anticoagulated for 6 months, since off (2019)  S/p pacemaker placement in 2018 for symptomatic bradycardia and complete heart block.   Mild aortic stenosis  Arthritis  Microcolitis  CT 2021 showed mild emphysema Surgical History  She  has a past surgical history that includes IR Extremity Venogram Left (7/25/2019); Breast Excisional Biopsy (Right, 1117-1491); Breast surgery; Bunionectomy; Arthroscopy shoulder rotator cuff repair; appendectomy; TOTAL KNEE ARTHROPLASTY (Left, 8/30/2018); Tonsillectomy; TOTAL KNEE ARTHROPLASTY (Right, 11/8/2018); Ep Pacemaker Insert (N/A, 3/29/2019); and Ir Extremity Venogram Left (7/25/2019).      Reviewed, changes/additions include NONE.  Social History & Habits  she  reports that she quit smoking about 61 years ago. She has a 1.00 pack-year smoking history. She has never used smokeless tobacco. She reports current alcohol use of about 7.0 standard drinks of alcohol per week. She reports that she does not use drugs.    Reviewed, " changes/additions include None.     Code Status: Full Code  Marital Status:  50+years  Work History: Retired   Surrogate decision maker/POA: , Ray        Allergies  Allergies   Allergen Reactions     Nsaids Other (See Comments)     Patient has history of microcolitis.  GI aggravation     Sulfa Drugs      Made her eyes stingy- it was eyedrops  Eye drops caused burning    Family History  family history includes Alzheimer Disease in her father; Breast Cancer in her cousin; Heart Disease in her mother. Psychosocial Needs  Social History     Social History Narrative     Not on file     Additional psychosocial needs reviewed per nursing assessment.       Prior to Admission Medications   Medications Prior to Admission   Medication Sig Dispense Refill Last Dose     acetaminophen (TYLENOL) 500 MG tablet Take 500-1,000 mg by mouth every 6 hours as needed   Past Week at Unknown time     atorvastatin (LIPITOR) 20 MG tablet Take 20 mg by mouth At Bedtime   7/3/2022 at Unknown time     calcium citrate (CITRACAL) 950 (200 Ca) MG tablet Take 1 tablet by mouth daily   7/4/2022 at Unknown time     Cholecalciferol (VITAMIN D3) 1000 units CAPS Take 1,000 Units by mouth daily.   7/4/2022 at Unknown time     cyanocobalamin (VITAMIN B-12) 100 MCG tablet Take 100 mcg by mouth daily   7/4/2022 at Unknown time     gabapentin (NEURONTIN) 300 MG capsule Take 300 mg by mouth At Bedtime   7/3/2022 at Unknown time     spironolactone (ALDACTONE) 25 MG tablet Take 25 mg by mouth daily   7/4/2022 at Unknown time            Review of Systems:  Constitutional: negative  Eyes: negative  Ears, nose, mouth, throat, and face: negative  Respiratory: negative  Cardiovascular: negative  Gastrointestinal: negative  Genitourinary:negative  Integument/breast: negative  Musculoskeletal:negative  Neurological: negative Physical Exam:   Temp:  [97.3  F (36.3  C)-97.6  F (36.4  C)] 97.3  F (36.3  C)  Pulse:  [60-66] 66  Resp:  [16]  "16  BP: (139-140)/(68-78) 139/68  SpO2:  [93 %-97 %] 93 %  /56 (BP Location: Left arm, Patient Position: Semi-Valdez's)   Pulse 60   Temp 98.1  F (36.7  C) (Oral)   Resp 16   Ht 1.575 m (5' 2\")   Wt 48.1 kg (106 lb)   SpO2 94%   BMI 19.39 kg/m    General appearance: alert, appears stated age and cooperative  Head: Normocephalic, without obvious abnormality, scalp laceration on right parietal area, small amount of dried blood  Eyes: Sclera white, EOM intact  Nose: no discharge, nares normal  Neck: supple, symmetrical, trachea midline  Back: No spinal pain with palpation  Lungs: clear to auscultation bilaterally  Heart: Regular rate and rhythm, paced, S1, S2 present  Abdomen: soft, non-tender; bowel sounds normal; no masses,  no organomegaly  Extremities: Warm, dry, moves all extremities, negative for LE edema, +venous stasis skin discoloration on BLE  Pulses: 2+ and symmetric  Skin: Skin color, texture, turgor normal. No rashes or lesions  Neurologic: Mental status: Alert, oriented, thought content appropriate  Sensory: normal  Motor:grossly normal     Pertinent Labs  Lab Results: personally reviewed.   Results for orders placed or performed during the hospital encounter of 07/04/22   Head CT w/o contrast     Status: None    Narrative    EXAM: CT HEAD W/O CONTRAST  LOCATION: [facility]  DATE/TIME: 7/4/2022 6:35 PM    INDICATION: Head injury.  COMPARISON: None.  TECHNIQUE: Routine CT Head without IV contrast. Multiplanar reformats. Dose reduction techniques were used.    FINDINGS:  INTRACRANIAL CONTENTS: No intracranial hemorrhage, extraaxial collection, or mass effect.  No CT evidence of acute infarct. Mild presumed chronic small vessel ischemic changes. Mild generalized volume loss. No hydrocephalus.     VISUALIZED ORBITS/SINUSES/MASTOIDS: Prior bilateral cataract surgery. Visualized portions of the orbits are otherwise unremarkable. No paranasal sinus mucosal disease. No " middle ear or mastoid effusion.    BONES/SOFT TISSUES: Right parietal scalp hematoma. No acute calvarial abnormality.      Impression    IMPRESSION:  1.  No acute intracranial hemorrhage.  2.  Right parietal scalp hematoma without underlying calvarial fracture.   Cervical spine CT w/o contrast     Status: None    Narrative    EXAM: CT CERVICAL SPINE W/O CONTRAST  LOCATION: [facility]  DATE/TIME: 7/4/2022 6:35 PM    INDICATION: Neck pain.  COMPARISON: None.  TECHNIQUE: Routine CT Cervical Spine without IV contrast. Multiplanar reformats. Dose reduction techniques were used.    FINDINGS:  VERTEBRA: Osteopenia. Cervical vertebral body heights are maintained. Grade 1 anterolisthesis C2 on C3, C3 on C4, and C7 on T1. Severe degenerative changes at C1-C2. No acute cervical spine fracture.      CANAL/FORAMINA: No canal or neural foraminal stenosis.    PARASPINAL: No extraspinal abnormality.      Impression    IMPRESSION:  1.  No acute cervical spine fracture.   XR Shoulder Right 2 Views     Status: None    Narrative    EXAM: XR SHOULDER 2 VIEW RIGHT  LOCATION: [facility]  DATE/TIME: 7/4/2022 6:47 PM    INDICATION: Injury, pain acute mildly comminuted fracture of the midshaft of the clavicle with apex superior angulation and mild superior displacement of the distal fragment.    Cross in the humeral head. No dislocation. Mild degenerative arthritis of the glenohumeral joint.  COMPARISON: None.      Impression    IMPRESSION: Normal joint spaces and alignment. No fracture.    XR Pelvis and Hip Right 2 Views     Status: None    Narrative    EXAM: XR PELVIS AND HIP RIGHT 2 VIEWS  LOCATION: [facility]  DATE/TIME: 7/4/2022 6:48 PM    INDICATION: Injury, pain  COMPARISON: None.      Impression    IMPRESSION: Acute displaced fracture of the right superior pubic ramus laterally along with femoroacetabular junction. Acute mildly displaced fracture of the  right inferior pubic ramus. Mild deformity right sacral ala could represent fracture. No   proximal femur fracture. Mild degenerative arthritis right hip joint. Degenerative changes lumbar spine.    Pelvic calcifications suggestive of calcified uterine fibroids.   Asymptomatic COVID-19 Virus (Coronavirus) by PCR Nose     Status: Normal    Specimen: Nose; Swab   Result Value Ref Range    SARS CoV2 PCR Negative Negative    Narrative    Testing was performed using the Xpert Xpress SARS-CoV-2 Assay on the   Cepheid Gene-Xpert Instrument Systems. Additional information about   this Emergency Use Authorization (EUA) assay can be found via the Lab   Guide. This test should be ordered for the detection of SARS-CoV-2 in   individuals who meet SARS-CoV-2 clinical and/or epidemiological   criteria. Test performance is unknown in asymptomatic patients. This   test is for in vitro diagnostic use under the FDA EUA for   laboratories certified under CLIA to perform high complexity testing.   This test has not been FDA cleared or approved. A negative result   does not rule out the presence of PCR inhibitors in the specimen or   target RNA in concentration below the limit of detection for the   assay. The possibility of a false negative should be considered if   the patient's recent exposure or clinical presentation suggests   COVID-19. This test was validated by the [facility]. This laboratory is certified under the Clinical Laboratory Improvement Amendments of 1988 (CLIA-88) as qualified to perform high complexity laboratory testing.          Pertinent Radiology:  Radiology Results: personally reviewed.   XR Pelvis and Hip Right 2 Views    Result Date: 7/4/2022  EXAM: XR PELVIS AND HIP RIGHT 2 VIEWS LOCATION: [facility] DATE/TIME: 7/4/2022 6:48 PM INDICATION: Injury, pain COMPARISON: None.     IMPRESSION: Acute displaced fracture of the right superior pubic ramus  laterally along with femoroacetabular junction. Acute mildly displaced fracture of the right inferior pubic ramus. Mild deformity right sacral ala could represent fracture. No proximal femur fracture. Mild degenerative arthritis right hip joint. Degenerative changes lumbar spine. Pelvic calcifications suggestive of calcified uterine fibroids.    XR Shoulder Right 2 Views    Addendum Date: 7/4/2022    On the prior report the impression was was interosseous has chronic into the indication feels. Corrected report follows. EXAM: XR SHOULDER 2 VIEW RIGHT LOCATION: [facility] DATE/TIME: 7/4/2022 6:47 PM INDICATION: Injury, pain COMPARISON: None. IMPRESSION: Acute mildly comminuted fracture of the midshaft of the clavicle with apex superior angulation and mild superior displacement of the distal fragment. [place] in the humeral head. No dislocation. Mild degenerative arthritis of the glenohumeral joint.     Result Date: 7/4/2022  EXAM: XR SHOULDER 2 VIEW RIGHT LOCATION: [facility] DATE/TIME: 7/4/2022 6:47 PM INDICATION: Injury, pain acute mildly comminuted fracture of the midshaft of the clavicle with apex superior angulation and mild superior displacement of the distal fragment. [place] in the humeral head. No dislocation. Mild degenerative arthritis of the glenohumeral joint. COMPARISON: None.     IMPRESSION: Normal joint spaces and alignment. No fracture.     Cervical spine CT w/o contrast    Addendum Date: 7/4/2022    Addendum: There is an acute fracture of the mid aspect of the right clavicle.    Result Date: 7/4/2022  EXAM: CT CERVICAL SPINE W/O CONTRAST LOCATION: [facility] DATE/TIME: 7/4/2022 6:35 PM INDICATION: Neck pain. COMPARISON: None. TECHNIQUE: Routine CT Cervical Spine without IV contrast. Multiplanar reformats. Dose reduction techniques were used. FINDINGS: VERTEBRA: Osteopenia. Cervical vertebral body heights are maintained. Grade  1 anterolisthesis C2 on C3, C3 on C4, and C7 on T1. Severe degenerative changes at C1-C2. No acute cervical spine fracture.  CANAL/FORAMINA: No canal or neural foraminal stenosis. PARASPINAL: No extraspinal abnormality.     IMPRESSION: 1.  No acute cervical spine fracture.    Head CT w/o contrast    Result Date: 7/4/2022  EXAM: CT HEAD W/O CONTRAST LOCATION: [facility] DATE/TIME: 7/4/2022 6:35 PM INDICATION: Head injury. COMPARISON: None. TECHNIQUE: Routine CT Head without IV contrast. Multiplanar reformats. Dose reduction techniques were used. FINDINGS: INTRACRANIAL CONTENTS: No intracranial hemorrhage, extraaxial collection, or mass effect.  No CT evidence of acute infarct. Mild presumed chronic small vessel ischemic changes. Mild generalized volume loss. No hydrocephalus. VISUALIZED ORBITS/SINUSES/MASTOIDS: Prior bilateral cataract surgery. Visualized portions of the orbits are otherwise unremarkable. No paranasal sinus mucosal disease. No middle ear or mastoid effusion. BONES/SOFT TISSUES: Right parietal scalp hematoma. No acute calvarial abnormality.     IMPRESSION: 1.  No acute intracranial hemorrhage. 2.  Right parietal scalp hematoma without underlying calvarial fracture.      Cardiographics:   EKG Results: personally reviewed.   EKG: normal EKG, normal sinus rhythm, unchanged from previous tracings.    This note was created with help of Dragon dictation system. Grammatical /typing errors are not intentional.    Wendi Bell MD   7/4/2022

## 2022-07-05 NOTE — CONSULTS
Care Management Initial Consult    General Information  Assessment completed with: Patient, Spouse or significant other, Patient and  Stephanie  Type of CM/SW Visit: Initial Assessment    Primary Care Provider verified and updated as needed: Yes   Readmission within the last 30 days: no previous admission in last 30 days      Reason for Consult: discharge planning  Advance Care Planning: Advance Care Planning Reviewed: no concerns identified, other (see comments) (Declined [employer] information, has it at home.)          Communication Assessment  Patient's communication style: spoken language (English or Bilingual)    Hearing Difficulty or Deaf: no   Wear Glasses or Blind: yes    Cognitive  Cognitive/Neuro/Behavioral: WDL                      Living Environment:   People in home: spouse     Current living Arrangements: house      Able to return to prior arrangements: other (see comments) (Pending clinical progress)  Living Arrangement Comments: Lives with     Family/Social Support:  Care provided by: self  Provides care for: no one  Marital Status:             Description of Support System: Supportive, Involved    Support Assessment: Adequate family and caregiver support    Current Resources:   Patient receiving home care services: No     Community Resources: None  Equipment currently used at home: none  Supplies currently used at home: None    Employment/Financial:  Employment Status: retired        Financial Concerns: No concerns identified           Lifestyle & Psychosocial Needs:  Social Determinants of Health     Tobacco Use: Medium Risk     Smoking Tobacco Use: Former Smoker     Smokeless Tobacco Use: Never Used   Alcohol Use: Not on file   Financial Resource Strain: Not on file   Food Insecurity: Not on file   Transportation Needs: Not on file   Physical Activity: Not on file   Stress: Not on file   Social Connections: Not on file   Intimate Partner Violence: Not on file    Depression: Not on file   Housing Stability: Not on file       Functional Status:  Prior to admission patient needed assistance:   Dependent ADLs:: Independent (Was independent with all ADL prior to fall)  Dependent IADLs:: Independent (Was independent with IADLs prior to fall)  Assesssment of Functional Status: Not at  functional baseline, Needs placement in a SNF/TCF for rehabilitation    Mental Health Status:          Chemical Dependency Status:                Values/Beliefs:  Spiritual, Cultural Beliefs, [employer] Practices, Values that affect care:                 Additional Information:  Alert, oriented patient lives with spouse Stephanie in a private residence. Presents to ED due to pain after suffering a fall, down three stairs while vacuuming at home. Prior to today patient was independent with I/ADLs; used no assistive device; and drove.  Admitted due to multiple pelvic fractures. Declined [employer] Cho[employer] information because she form.  Stephanie will transport patient on discharge if appropriate. Other needs pending clinical progression and PT/OT recommendations.    LAKESHIA JACOBSON, RN/CM

## 2022-07-05 NOTE — PLAN OF CARE
Goal Outcome Evaluation:      A/Ox4. Strict bedrest per ortho. Sling in place on right upper extremity. IV started in left forearm. PRN oxy given x1 as well as shreyas tylenol for pain control. CMS intact. Purewick in place. Mg protocol stable, recheck in AM. Bandage removed from scalp per pt request, abraision clean and dry, flushed with saline.

## 2022-07-05 NOTE — PHARMACY-ADMISSION MEDICATION HISTORY
Pharmacy Note - Admission Medication History    Pertinent Provider Information:      ______________________________________________________________________    Prior To Admission (PTA) med list completed and updated in EMR.       PTA Med List   Medication Sig Last Dose     acetaminophen (TYLENOL) 500 MG tablet Take 500-1,000 mg by mouth every 6 hours as needed Past Week at Unknown time     atorvastatin (LIPITOR) 20 MG tablet Take 20 mg by mouth At Bedtime 7/3/2022 at Unknown time     calcium citrate (CITRACAL) 950 (200 Ca) MG tablet Take 1 tablet by mouth daily 7/4/2022 at Unknown time     Cholecalciferol (VITAMIN D3) 1000 units CAPS Take 1,000 Units by mouth daily. 7/4/2022 at Unknown time     cyanocobalamin (VITAMIN B-12) 100 MCG tablet Take 100 mcg by mouth daily 7/4/2022 at Unknown time     gabapentin (NEURONTIN) 300 MG capsule Take 300 mg by mouth At Bedtime 7/3/2022 at Unknown time     spironolactone (ALDACTONE) 25 MG tablet Take 25 mg by mouth daily 7/4/2022 at Unknown time       Information source(s): Patient and CareEverywhere/[facility]  Method of interview communication: in-person    Summary of Changes to PTA Med List  New: None  Discontinued: aspirin, Bismuth  Changed: Added doses to all medications.      Patient was asked about OTC/herbal products specifically.  PTA med list reflects this.    In the past week, patient estimated taking medication this percent of the time:  greater than 90%.    Allergies were reviewed, assessed, and updated with the patient.      Patient does not use any multi-dose medications prior to admission.    The information provided in this note is only as accurate as the sources available at the time of the update(s).    Thank you for the opportunity to participate in the care of this patient.    Sohail Diaz RPH  7/4/2022 7:32 PM

## 2022-07-05 NOTE — PROGRESS NOTES
Care Management Follow Up    Length of Stay (days): 1    Expected Discharge Date: 07/06/2022     Concerns to be Addressed: medical improvement    Patient plan of care discussed at interdisciplinary rounds: Yes    Anticipated Discharge Disposition:  Transitional Care     Anticipated Discharge Services: TBD    Anticipated Discharge DME: Other (see comment) (Pending PT/OT recommendations)    Patient/family educated on Medicare website which has current facility and service quality ratings: yes    Education Provided on the Discharge Plan:  CMmet with patient. AVS bedside.     Patient/Family in Agreement with the Plan:  yes    Referrals Placed by CM/SW:  TCU and home care        Additional Information:  Chart reviewed. CM met with patient and family bedside. CM discussed the recommendations for TCU. Patient was thinking about it and therapy came into the room. CM left the TCU list for patient and family to review and said CM would come back after therapy to discuss. CM came back to visit patient. Family was gone. Patient is agreeable to a referral being sent to [facility] and home care. Transportation TBD. CM will follow.     Referral sent  [facility] (SNF)        Referrals sent  [facility]  [facility]  [facility] CARE [facility] ([facility])  [facility] ([facility])  [facility] C[facility]      Betsy Parker RN

## 2022-07-05 NOTE — PROGRESS NOTES
"CLINICAL NUTRITION SERVICES - ASSESSMENT NOTE     Nutrition Prescription    RECOMMENDATIONS FOR MDs/PROVIDERS TO ORDER:  None     Malnutrition Status:    Pt does not meet 2 criteria     Recommendations already ordered by Registered Dietitian (RD):  none    Future/Additional Recommendations:   none     REASON FOR ASSESSMENT  Helena Brown is a/an 82 year old female assessed by the dietitian for Reason For Assessment: identified at risk by screening criteria    NUTRITION HISTORY  Nutrition/Diet History  Typical Intake (Food/Fluid/EN/PN): intake at home has been good  Wt has been stable  Food Allergy(s)/Intolerance(s): NKFA    CURRENT NUTRITION ORDERS  Nutrition  Diet/Nutrition Received: regular  Diet/Feeding Tolerance: good  Intake (%): 100%          LABS  Lab Results Reviewed: reviewed, pertinent  Lab Results Comments: Mg-1.7    MEDICATIONS  Pertinent Medications Reviewed: reviewed, pertinent  Pertinent Medications Comments: senokot    ANTHROPOMETRICS  Height: 157.5 cm (5' 2\")  Most Recent Weight: 48.1 kg (106 lb)      kg  Body Mass Index (BMI)  BMI (kg/m2): 19.38  BMI Assessment: BMI 18.5-24.9: normal      Weight for Calculation (kg): 48.1 kg (106 lb 0.7 oz)     ASSESSED NUTRITION NEEDS  Estimated Calorie Needs  Estimated Calorie Need Method: kcal/kg  Estimated Calorie Requirement (kcal/day): 1250-1500kcal/d  Kcal/k-30kcal/kg IBW  KCAL/KG  Kcal/k-30kcal/kg IBW  Estimated/Assessed Needs  Additional Documentation: Protein Requirements (Group)  Protein Requirements  Estimated/Assessed Protein Need (g/day): 50-60g/d  Estimated/Assessed Protein Need (g/kg): 1-1.2g/kg/d  Fluid Requirements  Estimated/Assessed Fluid Req (mL/day): 1250-1500ml/d  Estimated/Assessed Fluid Requirements mL/Kml/kcal  RDA Method (mL): 1250  Denae-Segar Method (> 20 kg) (mL): 3905                MALNUTRITION  Final Malnutrition Summary  Malnutrition Criteria Met?: no  Status: initial    NUTRITION DIAGNOSIS  Nutrition Diagnosis: " No diagnosis at this time          INTERVENTIONS  Implementation  None at this time     Goals  Patient Goals:Nutrition Focus  Nutrition Goals:: PO  PO Goal:: PO >75%  PO goal status:: Ongoing, Progressing     Monitoring/Evaluation  Monitoring/Evaluation  Monitoring: Monitor patient per protocol

## 2022-07-05 NOTE — PROGRESS NOTES
07/05/22 1130   Quick Adds   Type of Visit Initial Occupational Therapy Evaluation   Living Environment   People in Home spouse;child(talib), adult   Current Living Arrangements house   Home Accessibility stairs to enter home;stairs within home   Number of Stairs, Within Home, Primary seven  (split level)   Living Environment Comments tub/sh with tub bench, walk-in shower with shower stool, standard toilet but owns over the toilet commode, owns SPC, FWW.   Self-Care   Usual Activity Tolerance good   Current Activity Tolerance good   Instrumental Activities of Daily Living (IADL)   Previous Responsibilities meal prep;housekeeping;laundry;shopping;medication management   IADL Comments Pt was independent with all IADLs; has support from spouse and dtr who works from home   General Information   Onset of Illness/Injury or Date of Surgery 07/04/22   Referring Physician Dr. Cecelia Campuzano   Patient/Family Therapy Goal Statement (OT) Pt would like to return to PLOF   Additional Occupational Profile Info/Pertinent History of Current Problem high complexity   Existing Precautions/Restrictions weight bearing   Right Upper Extremity (Weight-bearing Status) non weight-bearing (NWB)   Left Lower Extremity (Weight-bearing Status) weight-bearing as tolerated (WBAT)   Right Lower Extremity (Weight-bearing Status) weight-bearing as tolerated (WBAT)   Cognitive Status Examination   Orientation Status orientation to person, place and time   Visual Perception   Visual Impairment/Limitations WFL   Sensory   Sensory Quick Adds No deficits were identified   Pain Assessment   Patient Currently in Pain No   Integumentary/Edema   Integumentary/Edema no deficits were identifed   Posture   Posture forward head position   Range of Motion Comprehensive   General Range of Motion other (see comments)  (L UE WFLs)   Strength Comprehensive (MMT)   General Manual Muscle Testing (MMT) Assessment other (see comments)  (L UE WFLs; R UE unable to be  tested)   Muscle Tone Assessment   Muscle Tone Quick Adds No deficits were identified   Coordination   Upper Extremity Coordination No deficits were identified   Transfers   Transfers toilet transfer   Toilet Transfer   Type (Toilet Transfer) stand pivot/stand step   London Level (Toilet Transfer) minimum assist (75% patient effort)   Assistive Device (Toilet Transfer) walker, raquel;commode chair   Balance   Balance Assessment standing balance: dynamic   Balance Comments MIN A   Activities of Daily Living   BADL Assessment/Intervention upper body dressing;lower body dressing   Upper Body Dressing Assessment/Training   London Level (Upper Body Dressing) maximum assist (25% patient effort)   Lower Body Dressing Assessment/Training   London Level (Lower Body Dressing) maximum assist (25% patient effort)   Clinical Impression   Criteria for Skilled Therapeutic Interventions Met (OT) Yes, treatment indicated   OT Diagnosis ADLs and fxl txrs affected by R UE NWB and pelvic pain   OT Problem List-Impairments impacting ADL activity tolerance impaired;balance;mobility;pain   Assessment of Occupational Performance 5 or more Performance Deficits   Identified Performance Deficits all ADLs and fxl txrs   Clinical Decision Making Complexity (OT) high complexity   Risk & Benefits of therapy have been explained evaluation/treatment results reviewed;care plan/treatment goals reviewed;current/potential barriers reviewed;patient;spouse/significant other;son   OT Discharge Planning   OT Discharge Recommendation (DC Rec) (S)  Transitional Care Facility   OT Rationale for DC Rec Pt requires Ax1 for ADLs and fxl txrs, and needs to be MOD I to return home.   Total Evaluation Time (Minutes)   Total Evaluation Time (Minutes) 15   OT Goals   Therapy Frequency (OT) Daily   OT Predicted Duration/Target Date for Goal Attainment 07/08/22   OT Goals Upper Body Dressing;Toilet Transfer/Toileting   OT: Upper Body Dressing Minimal  assist   OT: Toilet Transfer/Toileting Supervision/stand-by assist

## 2022-07-06 ENCOUNTER — APPOINTMENT (OUTPATIENT)
Dept: PHYSICAL THERAPY | Facility: CLINIC | Age: 82
DRG: 543 | End: 2022-07-06
Payer: MEDICARE

## 2022-07-06 ENCOUNTER — APPOINTMENT (OUTPATIENT)
Dept: OCCUPATIONAL THERAPY | Facility: CLINIC | Age: 82
DRG: 543 | End: 2022-07-06
Payer: MEDICARE

## 2022-07-06 LAB
ALBUMIN SERPL-MCNC: 3.1 G/DL (ref 3.5–5)
ALP SERPL-CCNC: 57 U/L (ref 45–120)
ALT SERPL W P-5'-P-CCNC: 129 U/L (ref 0–45)
AST SERPL W P-5'-P-CCNC: 93 U/L (ref 0–40)
BILIRUB DIRECT SERPL-MCNC: 0.2 MG/DL
BILIRUB SERPL-MCNC: 0.7 MG/DL (ref 0–1)
ERYTHROCYTE [DISTWIDTH] IN BLOOD BY AUTOMATED COUNT: 13.6 % (ref 10–15)
HCT VFR BLD AUTO: 34.9 % (ref 35–47)
HGB BLD-MCNC: 11.5 G/DL (ref 11.7–15.7)
MAGNESIUM SERPL-MCNC: 1.7 MG/DL (ref 1.8–2.6)
MCH RBC QN AUTO: 29.9 PG (ref 26.5–33)
MCHC RBC AUTO-ENTMCNC: 33 G/DL (ref 31.5–36.5)
MCV RBC AUTO: 91 FL (ref 78–100)
PLATELET # BLD AUTO: 109 10E3/UL (ref 150–450)
PROT SERPL-MCNC: 6.3 G/DL (ref 6–8)
RBC # BLD AUTO: 3.84 10E6/UL (ref 3.8–5.2)
WBC # BLD AUTO: 6.5 10E3/UL (ref 4–11)

## 2022-07-06 PROCEDURE — 97116 GAIT TRAINING THERAPY: CPT | Mod: GP

## 2022-07-06 PROCEDURE — 97535 SELF CARE MNGMENT TRAINING: CPT | Mod: GO

## 2022-07-06 PROCEDURE — 250N000013 HC RX MED GY IP 250 OP 250 PS 637

## 2022-07-06 PROCEDURE — 97110 THERAPEUTIC EXERCISES: CPT | Mod: GP

## 2022-07-06 PROCEDURE — 120N000001 HC R&B MED SURG/OB

## 2022-07-06 PROCEDURE — 80076 HEPATIC FUNCTION PANEL: CPT

## 2022-07-06 PROCEDURE — 99232 SBSQ HOSP IP/OBS MODERATE 35: CPT | Mod: GC

## 2022-07-06 PROCEDURE — 85027 COMPLETE CBC AUTOMATED: CPT

## 2022-07-06 PROCEDURE — 250N000011 HC RX IP 250 OP 636

## 2022-07-06 PROCEDURE — 36415 COLL VENOUS BLD VENIPUNCTURE: CPT

## 2022-07-06 PROCEDURE — 83735 ASSAY OF MAGNESIUM: CPT

## 2022-07-06 RX ADMIN — SENNOSIDES AND DOCUSATE SODIUM 2 TABLET: 50; 8.6 TABLET ORAL at 20:10

## 2022-07-06 RX ADMIN — CALCITONIN SALMON 1 SPRAY: 200 SPRAY, METERED NASAL at 10:21

## 2022-07-06 RX ADMIN — ACETAMINOPHEN 975 MG: 325 TABLET ORAL at 18:33

## 2022-07-06 RX ADMIN — SENNOSIDES AND DOCUSATE SODIUM 1 TABLET: 50; 8.6 TABLET ORAL at 10:21

## 2022-07-06 RX ADMIN — GABAPENTIN 300 MG: 300 CAPSULE ORAL at 21:25

## 2022-07-06 RX ADMIN — ATORVASTATIN CALCIUM 20 MG: 10 TABLET, FILM COATED ORAL at 20:10

## 2022-07-06 RX ADMIN — ENOXAPARIN SODIUM 40 MG: 100 INJECTION SUBCUTANEOUS at 22:44

## 2022-07-06 RX ADMIN — ACETAMINOPHEN 975 MG: 325 TABLET ORAL at 01:10

## 2022-07-06 RX ADMIN — ACETAMINOPHEN 975 MG: 325 TABLET ORAL at 10:21

## 2022-07-06 RX ADMIN — Medication 950 MG: at 10:21

## 2022-07-06 ASSESSMENT — ACTIVITIES OF DAILY LIVING (ADL)
ADLS_ACUITY_SCORE: 31
ADLS_ACUITY_SCORE: 36
ADLS_ACUITY_SCORE: 35
ADLS_ACUITY_SCORE: 36
ADLS_ACUITY_SCORE: 33
ADLS_ACUITY_SCORE: 36
ADLS_ACUITY_SCORE: 33
ADLS_ACUITY_SCORE: 37
ADLS_ACUITY_SCORE: 36
ADLS_ACUITY_SCORE: 36

## 2022-07-06 NOTE — PROGRESS NOTES
[facility]    Progress Note - Hospitalist Service       Date of Admission:  7/4/2022    Assessment & Plan          Helena Brown is a 82 year old female with past medical history significant for PE 2019 (no anticoagulation presently), CAD s/p pacemaker for complete heart block 2018, and osteoporosis admitted on 7/4/2022 with pain 2/2 right superior and inferior rami fxs, right sacral ala fx, mildly displaced right midshaft clavicle fracture.    Right superior pubic ramus fx  Right inferior pubic ramus fx, mildly displaced  Sacral ala fx   Right midshaft clavicle fx, mildly displaced  Fall, mechanical   - PT/OT consulted. Discharging to TCU tomorrow.   - LLE/RLE weight bearing as tolerated   - RUE non-weight bearing, sling   - Ortho consulted    - Lovenox for DVT prophylaxis for minimum 4 weeks.   - Follow up with Dr. Connell in 3-4wks for repeat pelvis xray.  - Tylenol scheduled TID  - Oxycodone 2.5 mg q4h PRN  - 5% lidocaine cream, hot pack, cold pack all available PRN for discomfort/pain relief    Right parietal scalp hematoma   - Keep scalp wound clean and dry    Osteoporosis   Last DEXA 9/18/2018. Recommend bisphosphonate initiation 2 weeks post-fx.   - Calcitonin nasal spray for 2-4 wks  - Citracal 950mg daily    Thrombocytopenia, mild   PLTs 109 , baseline 170-200.   - LFT completed, showing elevated  and AST 93. AST:ALT <1. Likely acute elevation due to injury sustained in mechanical fall and not due to liver disease.    Chronic conditions:  CAD s/p pacemaker 2018 - PTA lipitor, holding aldactone in the setting of low BP  Osteoarthritis - PTA tylenol PRN   Colitis - avoid NSAIDs  RLS - PTA gabapentin      Diet: Combination Diet Regular Diet Adult    DVT Prophylaxis: Enoxaparin (Lovenox) SQ  Warren Catheter: Not present  Fluids: PO  Central Lines: None  Cardiac Monitoring: None  Code Status: Full Code      Disposition Plan   Expected discharge: 7/6/22     The patient's care  was discussed with the Attending Physician, Dr. Cecelia Campuzano.    Tutu Mittal DO PGY1    ______________________________________________________________________    Interval History     No acute events overnight. VSS, breathing comfortably on room air.  She states her pain is well controlled and that she has a good appetite.  No shortness of breath, cough, chest pain, N/V/D or abdominal pain. No problems with urination. Able to ambulate with assistance PT and OT, is weightbearing as tolerated.  No further complaints at this time.  Will be discharged to TCU tomorrow.    Data reviewed today: I reviewed all medications, new labs and imaging results over the last 24 hours.    Physical Exam   Vital Signs: Temp: 97.8  F (36.6  C) Temp src: Oral BP: 104/63 Pulse: 66   Resp: 16 SpO2: 96 % O2 Device: None (Room air)    Weight: 106 lbs 0 oz    Constitutional: awake, alert, cooperative, no apparent distress, and appears stated age  Eyes: Lids and lashes normal, pupils equal, sclera clear, conjunctiva normal  ENT: normocephalic.  Hematologic: no obvious petechial rash or unexplained bruising.  Respiratory: no increased work of breathing, good air exchange, clear to auscultation bilaterally, no crackles or wheezing  Cardiovascular: regular rate and rhythm, normal S1 and S2, no S3 or S4, and no murmur noted  GI: normal bowel sounds, soft, non-distended, non-tender  Musculoskeletal: tone is normal  RIGHT SHOULDER: swelling present, tenderness present, active range of motion decreased, R arm in sling. Bruising, swelling over midshaft of R clavicle.  Neurologic: Awake, alert, oriented to name, place and time.  Cranial nerves II-XII are grossly intact.   Level of consciousness: alert / normal  Affect: appropriate.    Data   Recent Labs   Lab 07/06/22  0830 07/05/22  0420 07/04/22  2417   WBC 6.5  --  9.5   HGB 11.5*  --  12.2   MCV 91  --  91   *  --  132*   NA  --   --  138   POTASSIUM  --  4.1 3.9   CHLORIDE  --   --  105    CO2  --   --  24   BUN  --   --  19   CR  --   --  0.86   ANIONGAP  --   --  9   MARYANN  --   --  9.1   GLC  --   --  133*     Recent Results (from the past 24 hour(s))   CT Pelvis Bone wo Contrast    Narrative    EXAM: CT PELVIS BONE WO CONTRAST  LOCATION: [facility]  DATE/TIME: 7/5/2022 3:05 PM    INDICATION: 82-year-old patient with right pelvic fractures.  COMPARISON: 07/04/2022 radiographs.  TECHNIQUE: CT scan of the pelvis was performed without IV contrast. Multiplanar reformats were obtained. Dose reduction techniques were used.  CONTRAST: None.    FINDINGS:    BONES:   -Minimally displaced sagittal oblique fracture of the right sacral ala (zone 1). There is fracture extension into the right sacroiliac joint.  -Mildly displaced and comminuted fracture at the junction of the right superior pubic ramus and anterior acetabulum.  -Minimally displaced fracture of the right inferior pubic ramus.  -Bone demineralization.    JOINTS:   -Moderate-advanced right hip degenerative arthrosis. This includes joint space narrowing, marginal osteophytosis, and subchondral cystic changes. Small right hip joint effusion.  -Degenerative disc disease L3-L4, L4-L5, and L5-S1. Low-grade degenerative anterolisthesis of L4 on L5 and L5 on S1. Advanced lower lumbar facet arthrosis.  -Mild bilateral sacroiliac degenerative arthrosis.  -Mild left hip degenerative arthrosis.    MUSCLES AND SOFT TISSUES:   -Mild asymmetric enlargement of the right piriformis, obturator internus, and short hip adductor musculature.  -Reticulated soft tissue thickening in the right medial pelvis. No discrete free fluid is evident. No soft tissue fluid collection.    OTHER:   -Calcified uterine fibroids.  -Atherosclerotic calcification.      Impression    IMPRESSION:  1.  Minimally displaced fracture of the right sacral ala (zone 1).  2.  Mildly displaced and comminuted fracture of the right superior pubic ramus-anterior acetabular  junction. Minimally displaced fracture of the right inferior pubic ramus.  3.  Mild enlargement of the right pelvic and medial thigh musculature, consistent with acute muscle strains.  4.  Soft tissue thickening-edema in the right pelvis.  5.  Moderate-advanced right hip degenerative arthrosis.  6.  Advanced lower lumbar degenerative changes.     Medications       acetaminophen  975 mg Oral Q8H     atorvastatin  20 mg Oral At Bedtime     calcitonin (salmon)  1 spray Alternating Nostrils Daily     calcium citrate  950 mg Oral Daily     enoxaparin ANTICOAGULANT  40 mg Subcutaneous Q24H     gabapentin  300 mg Oral At Bedtime     senna-docusate  1 tablet Oral BID    Or     senna-docusate  2 tablet Oral BID     sodium chloride (PF)  3 mL Intracatheter Q8H     [Held by provider] spironolactone  25 mg Oral Daily

## 2022-07-06 NOTE — PLAN OF CARE
Problem: Risk for Delirium  Goal: Improved Sleep  Outcome: Ongoing, Progressing    Problem: Functional Ability Impaired (Orthopaedic Fracture)  Goal: Optimal Functional Ability  Outcome: Ongoing, Progressing     Problem: Pain (Orthopaedic Fracture)  Goal: Acceptable Pain Control  Outcome: Ongoing, Progressing     Pt A&Ox4, able to make needs known. Pt denies pain this shift. CMS intact. Sleeping between cares. Pt changes position independently. Purewick in place. Bed alarm on for safety. No acute changes this shift.    Eliane Harding RN  [phone]

## 2022-07-06 NOTE — PROGRESS NOTES
Care Management Follow Up    Length of Stay (days): 2    Expected Discharge Date: 07/07/2022     Concerns to be Addressed: labs    Patient plan of care discussed at interdisciplinary rounds: Yes    Anticipated Discharge Disposition:  [facility] TCU     Anticipated Discharge Services: Other (see comment) (Pending clinidal progress)    Anticipated Discharge DME: Other (see comment) (Pending PT/OT recommendations)    Patient/family educated on Medicare website which has current facility and service quality ratings: yes    Education Provided on the Discharge Plan:  CM met with patient and . AVS per bedside RN.     Patient/Family in Agreement with the Plan: yes    Referrals Placed by CM/SW:  TCU    Private pay costs discussed: transportation costs    Additional Information:  Chart reviewed. CM met with patient and . Patient has been accepted at [facility] for Thursday July 7, 2022. Patient and  agreeable to this placement. CM was asked to arrange transportation. CM arranged for [facility] wheelchair transport at 1 pm on July 7, 2022. CM updated patient and . MD aware and agreeable to this plan.     [facility] can accept.     PT recommendations: Transitional Care Facility  OT recommendations:Transitional Care Facility      CM completed PAS- [id number]      Betsy Parker, [facility]

## 2022-07-06 NOTE — PROGRESS NOTES
"Orthopedic Progress Note      Assessment:    1. LC 1 complete injury to the right hemipelvis  2. Lateral rim nondisplaced acetabular fracture  3. Right mildly comminuted midshaft clavicle fracture    Plan:   - Continue PT/OT.   - Weightbearing status: WBAT with walker to bilateral lower extremities; platform weight bearing Right upper extremity  - Anticoagulation: lovenox in addition to SCDs, grisel stockings and early ambulation.  - Pain control. Continue Tylenol & oxycodone.  - Discharge planning: TCU placement for tomorrow, follow up with Dr. Connell in 2 weeks for repeat x-rays of pelvis    Subjective:  Pain: Right clavicle and pelvic fracture pain well controlled with Tylenol & oxycodone  Chest pain, SOB: no  Nausea, Vomiting:  no  Lightheadedness, Dizziness:  no  Neuro:  Patient denies new onset numbness or paresthesias in bilateral lower extremities, Right upper extremity  Bowel or bladder incontinence: no    Patient doing well. Has taken a few steps with walker in room with PT. Voiding.     Objective:  /63 (BP Location: Left arm)   Pulse 66   Temp 97.8  F (36.6  C) (Oral)   Resp 16   Ht 1.575 m (5' 2\")   Wt 48.1 kg (106 lb)   SpO2 96%   BMI 19.39 kg/m    The patient is A&Ox3. Appears comfortable. Sitting up at bedside. Right upper extremity in sling.  Skin with ecchymosis overlying Right clavicle.   Sensation intact & equal in bilateral upper extremity, forearm, hand and fingers. Sensation intact in lower extremities including circumferential thighs and calfs, as well as feet and toes.  Right radial & bilateral dorsalis pedis pulses intact.  Calves are soft and non-tender. Negative Ab's.  ROM: Moves all fingers and toes bilaterally. Right elbow flexion and extension, supination & pronation intact and without pain.  Motor:  UE: +5/5 in bilateral  strength, wrist extension, wrist flexion, finger abduction. Hitchhiker and okay sign intact bilaterally in upper extremities.   LE: +5/5 in hip " flexion, bilateral ankle PF/DF, EHL.    Pertinent Labs   Lab Results: personally reviewed.   Lab Results   Component Value Date    INR 1.97 (H) 2019    INR 1.86 (H) 2019    INR 1.02 2019     Lab Results   Component Value Date    WBC 6.5 2022    HGB 11.5 (L) 2022    HCT 34.9 (L) 2022    MCV 91 2022     (L) 2022     Lab Results   Component Value Date     2022    CO2 24 2022     IMAGIN2022 CT bony pelvis:  1.  Minimally displaced fracture of the right sacral ala (zone 1).  2.  Mildly displaced and comminuted fracture of the right superior pubic ramus-anterior acetabular junction. Minimally displaced fracture of the right inferior pubic ramus.  3.  Mild enlargement of the right pelvic and medial thigh musculature, consistent with acute muscle strains.  4.  Soft tissue thickening-edema in the right pelvis.  5.  Moderate-advanced right hip degenerative arthrosis.  6.  Advanced lower lumbar degenerative changes.      Report completed by:  Carolyn Machado PA-C  [facility]    Date: 2022  Time: 11:37 AM    I saw and evaluate the patient.  Recommend weight-bear as tolerated the right lower extremity.  She is mobilizing fairly well so far.  We will continue with conservative care.  Plan for platform weightbearing left upper extremity.  She needs to continue on Lovenox for DVT prophylaxis for minimum of 4 weeks.  I will see back in follow-up in 3 to 4 weeks time for repeat x-rays.    KARLOS CORNELL MD

## 2022-07-06 NOTE — PLAN OF CARE
Problem: Fracture Stabilization and Management (Orthopaedic Fracture)  Goal: Fracture Stability  Outcome: Ongoing, Progressing     Problem: Functional Ability Impaired (Orthopaedic Fracture)  Goal: Optimal Functional Ability  Outcome: Ongoing, Progressing  Intervention: Optimize Functional Ability  Recent Flowsheet Documentation  Taken 7/6/2022 1028 by Yesenia Mendoza, RN  Activity Management: activity adjusted per tolerance  Activity Assistance Provided: assistance, 1 person  Positioning/Transfer Devices: (RUE in sling)   pillows   immobilization device   in use     Problem: Pain (Orthopaedic Fracture)  Goal: Acceptable Pain Control  Outcome: Ongoing, Progressing  Intervention: Manage Acute Orthopaedic-Related Pain  Recent Flowsheet Documentation  Taken 7/6/2022 1106 by Yesenia Mendoza, RN  Pain Management Interventions: rest  Taken 7/6/2022 1021 by Yesenia Mendoza, RN  Pain Management Interventions: medication (see MAR)     Patient vital signs are at baseline: Yes  Patient able to ambulate as they were prior to admission or with assist devices provided by therapies during their stay:  Yes  Patient MUST void prior to discharge:  Yes  Patient able to tolerate oral intake:  Yes  Pain has adequate pain control using Oral analgesics:  Yes  Does patient have an identified :  Yes  Has goal D/C date and time been discussed with patient:  Yes    Pt is A & O x 4 & denies pain this shift. Utilizing scheduled APAP for pain management w/ good results. Participating in therapies; Up w/ an assist of 1, gait belt & cane while maintaining NWB status to RUE. Sling to RUE present. CMS intact to all extremities. Bruising present to R. Clavicle & R. Hip noted. Hematoma to scalp is open to air. Continuing on the Mg+ protocol which will be a redraw in the AM. VSS. Saline locked. Tolerating a regular diet. Anticipating discharge to [facility] tomorrow at 1:00 pm via [facility] wheelchair transport.

## 2022-07-07 ENCOUNTER — APPOINTMENT (OUTPATIENT)
Dept: OCCUPATIONAL THERAPY | Facility: CLINIC | Age: 82
DRG: 543 | End: 2022-07-07
Payer: MEDICARE

## 2022-07-07 VITALS
BODY MASS INDEX: 19.51 KG/M2 | HEIGHT: 62 IN | TEMPERATURE: 98 F | SYSTOLIC BLOOD PRESSURE: 103 MMHG | RESPIRATION RATE: 16 BRPM | OXYGEN SATURATION: 95 % | WEIGHT: 106 LBS | HEART RATE: 66 BPM | DIASTOLIC BLOOD PRESSURE: 57 MMHG

## 2022-07-07 LAB
ALBUMIN SERPL-MCNC: 3 G/DL (ref 3.5–5)
ALP SERPL-CCNC: 64 U/L (ref 45–120)
ALT SERPL W P-5'-P-CCNC: 107 U/L (ref 0–45)
ANION GAP SERPL CALCULATED.3IONS-SCNC: 8 MMOL/L (ref 5–18)
AST SERPL W P-5'-P-CCNC: 70 U/L (ref 0–40)
BILIRUB SERPL-MCNC: 0.6 MG/DL (ref 0–1)
BUN SERPL-MCNC: 13 MG/DL (ref 8–28)
CALCIUM SERPL-MCNC: 8.9 MG/DL (ref 8.5–10.5)
CHLORIDE BLD-SCNC: 106 MMOL/L (ref 98–107)
CO2 SERPL-SCNC: 22 MMOL/L (ref 22–31)
CREAT SERPL-MCNC: 0.69 MG/DL (ref 0.6–1.1)
ERYTHROCYTE [DISTWIDTH] IN BLOOD BY AUTOMATED COUNT: 13.5 % (ref 10–15)
GFR SERPL CREATININE-BSD FRML MDRD: 86 ML/MIN/1.73M2
GLUCOSE BLD-MCNC: 94 MG/DL (ref 70–125)
HCT VFR BLD AUTO: 34 % (ref 35–47)
HGB BLD-MCNC: 11.1 G/DL (ref 11.7–15.7)
INR PPP: 1.01 (ref 0.85–1.15)
MAGNESIUM SERPL-MCNC: 1.7 MG/DL (ref 1.8–2.6)
MCH RBC QN AUTO: 29.6 PG (ref 26.5–33)
MCHC RBC AUTO-ENTMCNC: 32.6 G/DL (ref 31.5–36.5)
MCV RBC AUTO: 91 FL (ref 78–100)
PLATELET # BLD AUTO: 111 10E3/UL (ref 150–450)
POTASSIUM BLD-SCNC: 4.1 MMOL/L (ref 3.5–5)
PROT SERPL-MCNC: 6.1 G/DL (ref 6–8)
RBC # BLD AUTO: 3.75 10E6/UL (ref 3.8–5.2)
SODIUM SERPL-SCNC: 136 MMOL/L (ref 136–145)
WBC # BLD AUTO: 6.3 10E3/UL (ref 4–11)

## 2022-07-07 PROCEDURE — 83735 ASSAY OF MAGNESIUM: CPT | Performed by: STUDENT IN AN ORGANIZED HEALTH CARE EDUCATION/TRAINING PROGRAM

## 2022-07-07 PROCEDURE — 85014 HEMATOCRIT: CPT

## 2022-07-07 PROCEDURE — 85610 PROTHROMBIN TIME: CPT

## 2022-07-07 PROCEDURE — 250N000013 HC RX MED GY IP 250 OP 250 PS 637

## 2022-07-07 PROCEDURE — 36415 COLL VENOUS BLD VENIPUNCTURE: CPT

## 2022-07-07 PROCEDURE — 97535 SELF CARE MNGMENT TRAINING: CPT | Mod: GO

## 2022-07-07 PROCEDURE — 80053 COMPREHEN METABOLIC PANEL: CPT

## 2022-07-07 PROCEDURE — 99238 HOSP IP/OBS DSCHRG MGMT 30/<: CPT | Mod: GC

## 2022-07-07 RX ORDER — ACETAMINOPHEN 325 MG/1
325-650 TABLET ORAL EVERY 6 HOURS PRN
Qty: 60 TABLET | Refills: 0 | Status: SHIPPED | OUTPATIENT
Start: 2022-07-07

## 2022-07-07 RX ORDER — ENOXAPARIN SODIUM 100 MG/ML
40 INJECTION SUBCUTANEOUS EVERY 24 HOURS
Qty: 12 ML | Refills: 0 | Status: SHIPPED | OUTPATIENT
Start: 2022-07-07 | End: 2022-08-06

## 2022-07-07 RX ORDER — CALCITONIN SALMON 200 [IU]/.09ML
1 SPRAY, METERED NASAL DAILY
Qty: 3.7 ML | Refills: 0 | Status: SHIPPED | OUTPATIENT
Start: 2022-07-08 | End: 2023-09-19

## 2022-07-07 RX ORDER — OXYCODONE HYDROCHLORIDE 5 MG/1
2.5 TABLET ORAL EVERY 6 HOURS PRN
Qty: 6 TABLET | Refills: 0 | Status: SHIPPED | OUTPATIENT
Start: 2022-07-07 | End: 2023-09-19

## 2022-07-07 RX ORDER — CHOLECALCIFEROL (VITAMIN D3) 50 MCG
1 TABLET ORAL DAILY
Qty: 30 TABLET | Refills: 0 | Status: SHIPPED | OUTPATIENT
Start: 2022-07-07

## 2022-07-07 RX ADMIN — ACETAMINOPHEN 975 MG: 325 TABLET ORAL at 09:13

## 2022-07-07 RX ADMIN — CALCITONIN SALMON 1 SPRAY: 200 SPRAY, METERED NASAL at 09:13

## 2022-07-07 RX ADMIN — Medication 950 MG: at 09:13

## 2022-07-07 RX ADMIN — ACETAMINOPHEN 975 MG: 325 TABLET ORAL at 01:48

## 2022-07-07 ASSESSMENT — ACTIVITIES OF DAILY LIVING (ADL)
ADLS_ACUITY_SCORE: 35
ADLS_ACUITY_SCORE: 33
ADLS_ACUITY_SCORE: 33

## 2022-07-07 NOTE — PLAN OF CARE
Physical Therapy Discharge Summary    Reason for therapy discharge:    Discharged to transitional care facility.    Progress towards therapy goal(s). See goals on Care Plan in [facility] electronic health record for goal details.  Goals not met.  Barriers to achieving goals:   discharge from facility and weakness, pain.    Therapy recommendation(s):    Continued therapy is recommended.  Rationale/Recommendations:  TCU.

## 2022-07-07 NOTE — PLAN OF CARE
Goal Outcome Evaluation:    Problem: Bleeding (Orthopaedic Fracture)  Goal: Absence of Bleeding  Outcome: Ongoing, Progressing     Problem: Embolism (Orthopaedic Fracture)  Goal: Absence of Embolism Signs and Symptoms  Outcome: Ongoing, Progressing     Problem: Fracture Stabilization and Management (Orthopaedic Fracture)  Goal: Fracture Stability  Outcome: Ongoing, Progressing     Problem: Functional Ability Impaired (Orthopaedic Fracture)  Goal: Optimal Functional Ability  Outcome: Ongoing, Progressing  Intervention: Optimize Functional Ability  Recent Flowsheet Documentation  Taken 7/6/2022 1833 by Alisa Turner RN  Activity Management: up to bedside commode  Activity Assistance Provided: assistance, 1 person  Positioning/Transfer Devices:   in use   pillows   sling     Problem: Pain (Orthopaedic Fracture)  Goal: Acceptable Pain Control  Outcome: Ongoing, Progressing  Intervention: Manage Acute Orthopaedic-Related Pain  Recent Flowsheet Documentation  Taken 7/6/2022 1530 by Alisa Turner RN  Pain Management Interventions:   medication offered but refused   care clustered   distraction   pain management plan reviewed with patient/caregiver   pillow support provided   repositioned   rest   therapeutic presence   therapeutic touch     Pt is A&Ox4. Pt continues to have no pain at rest but severe pain with movement. Up to bedside commode. Pt refused all PRN pain medication except Tyl. VSS. Discharge plan for TCU [facility] with WC ride for 1pm.

## 2022-07-07 NOTE — PLAN OF CARE
Discharging pt to [facility]. Gave pt copy of AVS, removed PIV, assisted in gathering belongings. Pt left via  [facility] w/c transport, accompanied by her .

## 2022-07-07 NOTE — PROGRESS NOTES
"Orthopedic Progress Note      Assessment:    1. LC 1 complete injury to the right hemipelvis  2. Lateral rim nondisplaced acetabular fracture  3. Right mildly comminuted midshaft clavicle fracture    Plan:   - Continue PT/OT.   - Weightbearing status: WBAT with walker to bilateral lower extremities; platform weight bearing Right upper extremity  - Anticoagulation: lovenox in addition to SCDs, grisel stockings and early ambulation.  - Pain control. Continue Tylenol.  - Discharge planning: TCU today. Ortho discharge orders placed. Tollow up with Dr. Connell in 3-4 weeks for repeat x-rays of pelvis    Subjective:  Pain: Right clavicle and pelvic fracture pain well controlled with Tylenol & oxycodone  SOB: no  Nausea, Vomiting:  no  Lightheadedness, Dizziness:  no  Neuro:  Patient denies new onset numbness or paresthesias in bilateral lower extremities, Right upper extremity  Bowel or bladder incontinence: no    Patient doing well. Pain controlled on oral meds. Voiding. +BM. Tolerating oral intake.    Objective:  /57 (BP Location: Left arm)   Pulse 66   Temp 98  F (36.7  C) (Axillary)   Resp 16   Ht 1.575 m (5' 2\")   Wt 48.1 kg (106 lb)   SpO2 95%   BMI 19.39 kg/m    The patient is A&Ox3. Appears comfortable. Sitting up at bedside. Right upper extremity in sling.  Skin with ecchymosis overlying Right clavicle.   Sensation intact & equal in bilateral upper extremity, forearm, hand and fingers. Sensation intact in lower extremities including circumferential thighs and calfs, as well as feet and toes.  Right radial & bilateral dorsalis pedis pulses intact.  Calves are soft and non-tender. Negative Ab's.  ROM: Moves all fingers and toes bilaterally. Right elbow flexion and extension, supination & pronation intact and without pain.  Motor:  UE: +5/5 in bilateral  strength, wrist extension, wrist flexion, finger abduction. Hitchhiker and okay sign intact bilaterally in upper extremities.   LE: +5/5 in hip " flexion, bilateral ankle PF/DF, EHL.    Pertinent Labs   Lab Results: personally reviewed.   Lab Results   Component Value Date    INR 1.01 2022    INR 1.97 (H) 2019    INR 1.86 (H) 2019     Lab Results   Component Value Date    WBC 6.3 2022    HGB 11.1 (L) 2022    HCT 34.0 (L) 2022    MCV 91 2022     (L) 2022     Lab Results   Component Value Date     2022    CO2 22 2022     IMAGIN2022 CT bony pelvis:  1.  Minimally displaced fracture of the right sacral ala (zone 1).  2.  Mildly displaced and comminuted fracture of the right superior pubic ramus-anterior acetabular junction. Minimally displaced fracture of the right inferior pubic ramus.  3.  Mild enlargement of the right pelvic and medial thigh musculature, consistent with acute muscle strains.  4.  Soft tissue thickening-edema in the right pelvis.  5.  Moderate-advanced right hip degenerative arthrosis.  6.  Advanced lower lumbar degenerative changes.    Report completed by:  Carolyn Machado PA-C  [facility]

## 2022-07-07 NOTE — DISCHARGE SUMMARY
[facility]  Discharge Summary - Medicine & Pediatrics       Date of Admission:  7/4/2022  Date of Discharge:  7/7/2022  Discharging Provider: Tutu Mittal DO            Attending Physician: Cecelia Campuzano MD  Discharge Service: Hospitalist Service    Discharge Diagnoses   Right superior pubic ramus fracture  Right inferior pubic ramus fracture  Sacral ala fracture   Mechanical Fall  Thrombocytopenia    Follow-ups Needed After Discharge   Follow-up Appointments     Follow Up Care      Please follow-up with Dr. Connell's team in 3 weeks at [facility]   for your pelvic and clavicle fractures. Call our scheduling line at   [phone] to make an appointment, if you do not already have one   scheduled.         Follow up with primary care provider for evaluation of thrombocytopenia and management of osteoporosis (bisphosphonate therapy).    Discharge Disposition   Discharged to rehabilitation facility  Condition at discharge: Stable    Hospital Course   Helena Brown was admitted on 7/4/2022 for right superior and inferior pelvic rami fractures, right sacral ala fracture, and mild displaced right midshaft clavicle fracture. She has a PMHx of PE in 2019 but is currently not on anticoagulation, as well as CAD s/p pacemaker for complete heart block in 2018, and osteoporosis. Labs on admission showed thrombocytopenia and hepatic panel showed elevated transaminases and decreased albumin. Transaminases and thrombocytopenia were followed and improved by the day of discharge. Pain was controlled with oral medications and her vitals were stable and she was satting well on room air. Orthopedics was consulted and they recommended non-operative management.  PT/OT also consulted and she required assistance for ADLs, and their recommendation was discharge to transitional care facility for further rehabilitation. She was transferred to [facility] TCU on 7/7, and will follow up with  orthopedics in 3 weeks. She will also follow up with her primary care provider for initiation of bisphosphonate therapy for her osteoporosis in 2 weeks, as well as for further evaluation of her thrombocytopenia.    Consultations This Hospital Stay   CARE MANAGEMENT / SOCIAL WORK IP CONSULT  PHYSICAL THERAPY ADULT IP CONSULT  OCCUPATIONAL THERAPY ADULT IP CONSULT  ORTHOPEDIC SURGERY IP CONSULT  PHYSICAL THERAPY ADULT IP CONSULT  OCCUPATIONAL THERAPY ADULT IP CONSULT  OCCUPATIONAL THERAPY ADULT IP CONSULT  PHYSICAL THERAPY ADULT IP CONSULT  PHYSICAL THERAPY ADULT IP CONSULT  OCCUPATIONAL THERAPY ADULT IP CONSULT  ORTHOPEDIC SURGERY IP CONSULT    Code Status   Full Code       The patient was discussed with Dr. Justen Mittal, [address] [address]  Phone: [phone]  Fax: [phone]  ______________________________________________________________________    Physical Exam   Vital Signs: Temp: 98  F (36.7  C) Temp src: Axillary BP: 103/57 Pulse: 66   Resp: 16 SpO2: 95 % O2 Device: None (Room air)    Weight: 106 lbs 0 oz     Constitutional: awake, alert, cooperative, no apparent distress, and appears stated age  Eyes: Lids and lashes normal, pupils equal, round and reactive to light, extra ocular muscles intact, sclera clear, conjunctiva normal  ENT: Normocephalic, without obvious abnormality, oral pharynx with moist mucous membranes.  Hematologic / Lymphatic: no cervical lymphadenopathy and no supraclavicular lymphadenopathy  Respiratory: No increased work of breathing, good air exchange, clear to auscultation bilaterally, no crackles or wheezing  Cardiovascular: Normal apical impulse, regular rate and rhythm, normal S1 and S2, no S3 or S4, and no murmur noted  GI: Normal bowel sounds, soft, non-distended, non-tender, no masses palpated  Skin: Warm, dry. Ecchymosis and swelling over midshaft of R clavicle, step off  "palpated.  Musculoskeletal: Normal tone. Right shoulder swelling and tenderness with decreased range of motion. R arm in sling.  Neurologic: Awake, alert, oriented to name, place and time.  Cranial nerves II-XII are grossly intact.  Motor is 5 out of 5 bilaterally. Sensory is intact.        Primary Care Physician   Brittany Man    Discharge Orders      General info for SNF    Length of Stay Estimate: Short Term Care: Estimated # of Days <30 Condition at Discharge: Improving Level of care:skilled  Rehabilitation Potential: Excellent Admission H&P remains valid and up-to-date: Yes Recent Chemotherapy: N/A Use Nursing Home Standing Orders: N/A     Mantoux Instructions    Give two-step Mantoux (PPD) Per Facility Policy {[id number]     Incentive Spirometry    Incentive Spirometry 10 times per hour, 4 times per day.     Reason for your hospital stay    Pelvic fracture, right clavicle fracture     When to call - Contact Surgeon Team    You may experience symptoms that require follow-up before your scheduled appointment. Refer to the \"Stoplight Tool\" for instructions on when to contact your Surgeon Team if you are concerned about pain control, blood clots, constipation, or if you are unable to urinate.     When to call - Reach out to Urgent Care    If you are not able to reach your Surgeon Team and you need immediate care, go to the Orthopedic Walk-in Clinic or Urgent Care at your Surgeon's office.  Do NOT go to the Emergency Room unless you have shortness of breath, chest pain, or other signs of a medical emergency.     When to call - Reasons to Call 911    Call 911 immediately if you experience sudden-onset chest pain, arm weakness/numbness, slurred speech, or shortness of breath     Symptoms - Fever Management    A low grade fever can be expected after surgery.  Use acetaminophen (TYLENOL) as needed for fever management.  Contact your Surgeon Team if you have a fever greater than 101.5 F, chills, and/or night sweats. "     Symptoms - Constipation management    Constipation (hard, dry bowel movements) is expected after surgery due to the combination of being less active, the anesthetic, and the opioid pain medication.  You can do the following to help reduce constipation:  ~  FLUIDS:  Drink clear liquids (water or Gatorade), or juice (apple/prune).  ~  DIET:  Eat a fiber rich diet.    ~  ACTIVITY:  Get up and move around several times a day.  Increase your activity as you are able.  MEDICATIONS:  Reduce the risk of constipation by starting medications before you are constipated.  You can take Miralax   (1 packet as directed) and/or a stool softener (Senokot 1-2 tablets 1-2 times a day).  If you already have constipation and these medications are not working, you can get magnesium citrate and use as directed.  If you continue to have constipation you can try an over the counter suppository or enema.  Call your Surgeon Team if it has been greater than 3 days since your last bowel movement.     Symptoms - Reduced Urine Output    Changes in the amount of fluids you drank before and after surgery may result in problems urinating.  It is important to stay well-hydrated after surgery and drink plenty of water. If it has been greater than 8 hours since you have urinated despite drinking plenty of water, call your Surgeon Team.     Activity - Exercises to prevent blood clots    Unless otherwise directed by your Surgeon team, perform the following exercises at least three times per day for the first four weeks after surgery to prevent blood clots in your legs: 1) Point and flex your feet (Ankle Pumps), 2) Move your ankle around in big circles, 3) Wiggle your toes, 4) Walk, even for short distances, several times a day, will help decrease the risk of blood clots.     Comfort and Pain Management - Pain after Surgery    Pain after surgery is normal and expected.  You will have some amount of pain for several weeks after surgery.  Your pain will  improve with time.  There are several things you can do to help reduce your pain including: rest, ice, elevation, and using pain medications as needed. Contact your Surgeon Team if you have pain that persists or worsens after surgery despite rest, ice, elevation, and taking your medication(s) as prescribed. Contact your Surgeon Team if you have new numbness, tingling, or weakness in your operative extremity.     Comfort and Pain Management - Cold therapy    Ice can be used to control swelling and discomfort after surgery. Place a thin towel over your operative site and apply the ice pack overtop. Leave ice pack in place for 20 minutes, then remove for 20 minutes. Repeat this 20 minutes on/20 minutes off routine as often as tolerated.     Medication Instructions - Acetaminophen (TYLENOL) Instructions    You were discharged with acetaminophen (TYLENOL) for pain management after surgery. Acetaminophen most effectively manages pain symptoms when it is taken on a schedule without missing doses (every four, six, or eight hours). Your Provider will prescribe a safe daily dose between 3000 - 4000 mg.  Do NOT exceed this daily dose. Most patients use acetaminophen for pain control for the first four weeks after surgery.  You can wean from this medication as your pain decreases.     Medication Instructions - Opioids - Tapering Instructions    In the first three days following surgery, your symptoms may warrant use of the narcotic pain medication every four to six hours as prescribed. This is normal. As your pain symptoms improve, focus your efforts on decreasing (tapering) use of narcotic medications. The most successful tapering strategy is to first, decrease the number of tablets you take every 4-6 hours to the minimum prescribed. Then, increase the amount of time between doses.  For example:  First, taper to   or 1 tablet every 4-6 hours.  Then, taper to   or 1 tablet every 6-8 hours.  Then, taper to   or 1 tablet every  8-10 hours.  Then, taper to   or 1 tablet every 10-12 hours.  Then, taper to   or 1 tablet at bedtime.  The bedtime dose can help with comfort during sleep and is typically the last dose to be discontinued after surgery.     Follow Up Care    Please follow-up with Dr. Connell's team in 3 weeks at [facility] for your pelvic and clavicle fractures. Call our scheduling line at [phone] to make an appointment, if you do not already have one scheduled.     Medication instructions - Anticoagulation - other    Take the lovenox as prescribed for a total of 4 weeks after surgery.  This is given to help minimize your risk of blood clot     Medication Instructions - No Opioid pain medication prescribed     Physical Therapy Adult Consult    Evaluate and treat as clinically indicated.    Reason: Status Post Hip Surgery     Occupational Therapy Adult Consult    Evaluate and treat as clinically indicated.    Reason: Status Post Hip Surgery     Occupational Therapy Adult Consult    Evaluate and treat as clinically indicated.    Reason: Status Post Surgery     Physical Therapy Adult Consult    Evaluate and treat as clinically indicated.    Reason:  pelvic fractures, right clavicle fracture     Fall precautions     Diet    Follow this diet upon discharge: Orders Placed This Encounter      Combination Diet Regular Diet Adult       Significant Results and Procedures   Most Recent 3 CBC's:  Recent Labs   Lab Test 07/07/22  0557 07/06/22  0830 07/04/22  2127   WBC 6.3 6.5 9.5   HGB 11.1* 11.5* 12.2   MCV 91 91 91   * 109* 132*     Most Recent 2 LFT's:  Recent Labs   Lab Test 07/07/22  0557 07/06/22  0636   AST 70* 93*   * 129*   ALKPHOS 64 57   BILITOTAL 0.6 0.7       Discharge Medications   Current Discharge Medication List      START taking these medications    Details   calcitonin, salmon, (MIACALCIN) 200 UNIT/ACT nasal spray Spray 1 spray into one nostril alternating nostrils daily Alternate nostril each  day.  Qty: 3.7 mL, Refills: 0    Associated Diagnoses: Multiple closed pelvic fractures with disruption of pelvic Kaw, initial encounter (H); Age related osteoporosis, unspecified pathological fracture presence      enoxaparin ANTICOAGULANT (LOVENOX) 40 MG/0.4ML syringe Inject 0.4 mLs (40 mg) Subcutaneous every 24 hours for 30 days  Qty: 12 mL, Refills: 0    Associated Diagnoses: Multiple closed pelvic fractures with disruption of pelvic Kaw, initial encounter (H)      oxyCODONE (ROXICODONE) 5 MG tablet Take 0.5 tablets (2.5 mg) by mouth every 6 hours as needed for pain  Qty: 6 tablet, Refills: 0    Associated Diagnoses: Multiple closed pelvic fractures with disruption of pelvic Kaw, initial encounter (H)      vitamin D3 (CHOLECALCIFEROL) 50 mcg (2000 units) tablet Take 1 tablet (50 mcg) by mouth daily  Qty: 30 tablet, Refills: 0    Associated Diagnoses: Multiple closed pelvic fractures with disruption of pelvic Kaw, initial encounter (H); Age related osteoporosis, unspecified pathological fracture presence         CONTINUE these medications which have CHANGED    Details   acetaminophen (TYLENOL) 325 MG tablet Take 1-2 tablets (325-650 mg) by mouth every 6 hours as needed for mild pain  Qty: 60 tablet, Refills: 0    Associated Diagnoses: Multiple closed pelvic fractures with disruption of pelvic Kaw, initial encounter (H)         CONTINUE these medications which have NOT CHANGED    Details   atorvastatin (LIPITOR) 20 MG tablet Take 20 mg by mouth At Bedtime      calcium citrate (CITRACAL) 950 (200 Ca) MG tablet Take 1 tablet by mouth daily      cyanocobalamin (VITAMIN B-12) 100 MCG tablet Take 100 mcg by mouth daily      gabapentin (NEURONTIN) 300 MG capsule Take 300 mg by mouth At Bedtime      spironolactone (ALDACTONE) 25 MG tablet Take 25 mg by mouth daily         STOP taking these medications       Cholecalciferol (VITAMIN D3) 1000 units CAPS Comments:   Reason for Stopping:             Allergies    Allergies   Allergen Reactions     Nsaids Other (See Comments)     Patient has history of microcolitis.  GI aggravation     Sulfa Drugs      Made her eyes stingy- it was eyedrops  Eye drops caused burning

## 2022-07-07 NOTE — PROGRESS NOTES
Care Management Discharge Note    Discharge Date: 07/07/2022       Discharge Disposition:  [facility]    Discharge Services:   [facility]     Discharge DME: Other (see comment) (Pending PT/OT recommendations)    Discharge Transportation: agency    Private pay costs discussed: transportation costs    PAS Confirmation Code:  ([id number])    Patient/family educated on Medicare website which has current facility and service quality ratings: yes    Education Provided on the Discharge Plan:  AVS per bedside RN.    Persons Notified of Discharge Plans: patient    Patient/Family in Agreement with the Plan: yes    Handoff Referral Completed: Yes    Additional Information:  Chart reviewed. CM following for discharge planning. Orders sent to [facility]. CM arranged transportation per patient and family request. Orders sent to [facility].         Betsy Parker RN

## 2022-07-08 DIAGNOSIS — Z71.89 OTHER SPECIFIED COUNSELING: ICD-10-CM

## 2022-07-11 ENCOUNTER — LAB REQUISITION (OUTPATIENT)
Dept: LAB | Facility: CLINIC | Age: 82
End: 2022-07-11
Payer: MEDICARE

## 2022-07-11 ENCOUNTER — TRANSITIONAL CARE UNIT VISIT (OUTPATIENT)
Dept: GERIATRICS | Facility: CLINIC | Age: 82
End: 2022-07-11
Payer: MEDICARE

## 2022-07-11 VITALS
BODY MASS INDEX: 19.51 KG/M2 | WEIGHT: 106 LBS | DIASTOLIC BLOOD PRESSURE: 77 MMHG | SYSTOLIC BLOOD PRESSURE: 122 MMHG | HEART RATE: 82 BPM | OXYGEN SATURATION: 96 % | RESPIRATION RATE: 18 BRPM | TEMPERATURE: 97 F | HEIGHT: 62 IN

## 2022-07-11 DIAGNOSIS — R53.81 PHYSICAL DECONDITIONING: ICD-10-CM

## 2022-07-11 DIAGNOSIS — S42.021D CLOSED DISPLACED FRACTURE OF SHAFT OF RIGHT CLAVICLE WITH ROUTINE HEALING, SUBSEQUENT ENCOUNTER: Primary | ICD-10-CM

## 2022-07-11 DIAGNOSIS — Z51.81 ENCOUNTER FOR THERAPEUTIC DRUG LEVEL MONITORING: ICD-10-CM

## 2022-07-11 DIAGNOSIS — R52 PAIN MANAGEMENT: ICD-10-CM

## 2022-07-11 DIAGNOSIS — S32.511D CLOSED FRACTURE OF SUPERIOR RAMUS OF RIGHT PUBIS WITH ROUTINE HEALING, SUBSEQUENT ENCOUNTER: ICD-10-CM

## 2022-07-11 PROCEDURE — 99310 SBSQ NF CARE HIGH MDM 45: CPT | Performed by: NURSE PRACTITIONER

## 2022-07-11 NOTE — LETTER
7/11/2022        RE: Helena Brown  [address] [facility] [address]        [facility]  Chief Complaint   Patient presents with     [facility] F/U     [facility] Record Number:  [id number]  Place of Service where encounter took place:  [facility] ([facility]) [[id number]]  Code Status: Full    HISTORY:      HPI:  Helena Brown  is 82 year old (1940) undergoing physical and occupational therapy. Excerpted from records She  was admitted on 7/4/2022 for right superior and inferior pelvic rami fractures, right sacral ala fracture, and mild displaced right midshaft clavicle fracture. She has a PMHx of PE in 2019 but is currently not on anticoagulation, as well as CAD s/p pacemaker for complete heart block in 2018, and osteoporosis. Labs on admission showed thrombocytopenia and hepatic panel showed elevated transaminases and decreased albumin. Transaminases and thrombocytopenia were followed and improved by the day of discharge. Orthopedics was consulted and they recommended non-operative management.  She was transferred to [facility] on 7/7, and will follow up with orthopedics in 3 weeks. She will also follow up with her primary care provider for initiation of bisphosphonate therapy for her osteoporosis in 2 weeks, as well as for further evaluation of her thrombocytopenia.    Today she was seen to review vital signs, labs, a routine visit and to establish care.  She denied chest pain shortness of breath cough or congestion.  She did report having two loose stools yesterday and does have a history of colitis per her report.  She reports her pain is controlled when she does not move a lot.  Her labs were reviewed and her AST and ALT have improved.  AST 70 they will from 93,  down from 129, hemoglobin 11.1, continues to be thrombocytopenic at 111 slightly up from 109    ALLERGIES:Nsaids and Sulfa drugs    PAST MEDICAL HISTORY:   Past Medical History:   Diagnosis Date      Anxiety      Arthritis      Asthma      Colitis     Collagenous     Complete heart block (H)      Hypertriglyceridemia      Macular degeneration      Osteopenia      Osteoporosis      Pacemaker      Pulmonary embolism (H)      Restless legs      Skin cancer, basal cell        PAST SURGICAL HISTORY:   has a past surgical history that includes IR Extremity Venogram Left (7/25/2019); Breast Excisional Biopsy (Right, [id number]); Breast surgery; Bunionectomy; Arthroscopy shoulder rotator cuff repair; appendectomy; TOTAL KNEE ARTHROPLASTY (Left, 8/30/2018); Tonsillectomy; TOTAL KNEE ARTHROPLASTY (Right, 11/8/2018); Ep Pacemaker Insert (N/A, 3/29/2019); and Ir Extremity Venogram Left (7/25/2019).    FAMILY HISTORY: family history includes Alzheimer Disease in her father; Breast Cancer in her cousin; Heart Disease in her mother.    SOCIAL HISTORY:  reports that she quit smoking about 61 years ago. She has a 1.00 pack-year smoking history. She has never used smokeless tobacco. She reports current alcohol use of about 7.0 standard drinks of alcohol per week. She reports that she does not use drugs.    ROS:  Constitutional: Negative for activity change, appetite change, fatigue and fever.   HENT: Negative for congestion.    Respiratory: Negative for cough, shortness of breath and wheezing.    Cardiovascular: Negative for chest pain and leg swelling.   Gastrointestinal: Negative for abdominal distention, abdominal pain, constipation, diarrhea and nausea.   Genitourinary: Negative for dysuria.   Musculoskeletal: Negative for arthralgia. Negative for back pain.  Right pubic Ramifracture, right clavicle fracture nonoperative  Skin: Negative for color change and wound.   Neurological: Negative for dizziness.   Psychiatric/Behavioral: Negative for agitation, behavioral problems and confusion.     Physical Exam:  Constitutional:       Appearance: Patient is well-developed.   HENT:      Head: Normocephalic.   Eyes:       "Conjunctiva/sclera: Conjunctivae normal.   Neck:      Musculoskeletal: Normal range of motion.   Cardiovascular:      Rate and Rhythm: Normal rate and regular rhythm.      Heart sounds: Normal heart sounds. No murmur.   Pulmonary:      Effort: No respiratory distress.      Breath sounds: Normal breath sounds. No wheezing or rales.   Abdominal:      General: Bowel sounds are normal. There is no distension.      Palpations: Abdomen is soft.      Tenderness: There is no abdominal tenderness.   Musculoskeletal:       Normal range of motion.     Skin:General:        Skin is warm.   Neurological:         Mental Status: Patient is alert and oriented to person, place, and time.   Psychiatric:         Behavior: Behavior normal.     Vitals:/77   Pulse 82   Temp 97  F (36.1  C)   Resp 18   Ht 1.575 m (5' 2\")   Wt 48.1 kg (106 lb)   SpO2 96%   BMI 19.39 kg/m   and Body mass index is 19.39 kg/m .    Lab/Diagnostic data:   Recent Results (from the past 240 hour(s))   Asymptomatic COVID-19 Virus (Coronavirus) by PCR Nose    Collection Time: 07/04/22  7:50 PM    Specimen: Nose; Swab   Result Value Ref Range    SARS CoV2 PCR Negative Negative   Basic metabolic panel    Collection Time: 07/04/22  9:27 PM   Result Value Ref Range    Sodium 138 136 - 145 mmol/L    Potassium 3.9 3.5 - 5.0 mmol/L    Chloride 105 98 - 107 mmol/L    Carbon Dioxide (CO2) 24 22 - 31 mmol/L    Anion Gap 9 5 - 18 mmol/L    Urea Nitrogen 19 8 - 28 mg/dL    Creatinine 0.86 0.60 - 1.10 mg/dL    Calcium 9.1 8.5 - 10.5 mg/dL    Glucose 133 (H) 70 - 125 mg/dL    GFR Estimate 67 >60 mL/min/1.73m2   CBC with platelets and differential    Collection Time: 07/04/22  9:27 PM   Result Value Ref Range    WBC Count 9.5 4.0 - 11.0 10e3/uL    RBC Count 4.05 3.80 - 5.20 10e6/uL    Hemoglobin 12.2 11.7 - 15.7 g/dL    Hematocrit 36.7 35.0 - 47.0 %    MCV 91 78 - 100 fL    MCH 30.1 26.5 - 33.0 pg    MCHC 33.2 31.5 - 36.5 g/dL    RDW 13.2 10.0 - 15.0 %    Platelet " Count 132 (L) 150 - 450 10e3/uL    % Neutrophils 84 %    % Lymphocytes 11 %    % Monocytes 5 %    % Eosinophils 0 %    % Basophils 0 %    % Immature Granulocytes 0 %    NRBCs per 100 WBC 0 <1 /100    Absolute Neutrophils 7.9 1.6 - 8.3 10e3/uL    Absolute Lymphocytes 1.1 0.8 - 5.3 10e3/uL    Absolute Monocytes 0.5 0.0 - 1.3 10e3/uL    Absolute Eosinophils 0.0 0.0 - 0.7 10e3/uL    Absolute Basophils 0.0 0.0 - 0.2 10e3/uL    Absolute Immature Granulocytes 0.0 <=0.4 10e3/uL    Absolute NRBCs 0.0 10e3/uL   Potassium    Collection Time: 07/05/22  4:20 AM   Result Value Ref Range    Potassium 4.1 3.5 - 5.0 mmol/L   Magnesium    Collection Time: 07/05/22  4:20 AM   Result Value Ref Range    Magnesium 1.7 (L) 1.8 - 2.6 mg/dL   Magnesium    Collection Time: 07/06/22  6:36 AM   Result Value Ref Range    Magnesium 1.7 (L) 1.8 - 2.6 mg/dL   Hepatic panel    Collection Time: 07/06/22  6:36 AM   Result Value Ref Range    Bilirubin Total 0.7 0.0 - 1.0 mg/dL    Bilirubin Direct 0.2 <=0.5 mg/dL    Protein Total 6.3 6.0 - 8.0 g/dL    Albumin 3.1 (L) 3.5 - 5.0 g/dL    Alkaline Phosphatase 57 45 - 120 U/L    AST 93 (H) 0 - 40 U/L     (H) 0 - 45 U/L   CBC with platelets    Collection Time: 07/06/22  8:30 AM   Result Value Ref Range    WBC Count 6.5 4.0 - 11.0 10e3/uL    RBC Count 3.84 3.80 - 5.20 10e6/uL    Hemoglobin 11.5 (L) 11.7 - 15.7 g/dL    Hematocrit 34.9 (L) 35.0 - 47.0 %    MCV 91 78 - 100 fL    MCH 29.9 26.5 - 33.0 pg    MCHC 33.0 31.5 - 36.5 g/dL    RDW 13.6 10.0 - 15.0 %    Platelet Count 109 (L) 150 - 450 10e3/uL   Magnesium    Collection Time: 07/07/22  5:57 AM   Result Value Ref Range    Magnesium 1.7 (L) 1.8 - 2.6 mg/dL   CBC with platelets    Collection Time: 07/07/22  5:57 AM   Result Value Ref Range    WBC Count 6.3 4.0 - 11.0 10e3/uL    RBC Count 3.75 (L) 3.80 - 5.20 10e6/uL    Hemoglobin 11.1 (L) 11.7 - 15.7 g/dL    Hematocrit 34.0 (L) 35.0 - 47.0 %    MCV 91 78 - 100 fL    MCH 29.6 26.5 - 33.0 pg    MCHC 32.6  31.5 - 36.5 g/dL    RDW 13.5 10.0 - 15.0 %    Platelet Count 111 (L) 150 - 450 10e3/uL   Comprehensive metabolic panel    Collection Time: 07/07/22  5:57 AM   Result Value Ref Range    Sodium 136 136 - 145 mmol/L    Potassium 4.1 3.5 - 5.0 mmol/L    Chloride 106 98 - 107 mmol/L    Carbon Dioxide (CO2) 22 22 - 31 mmol/L    Anion Gap 8 5 - 18 mmol/L    Urea Nitrogen 13 8 - 28 mg/dL    Creatinine 0.69 0.60 - 1.10 mg/dL    Calcium 8.9 8.5 - 10.5 mg/dL    Glucose 94 70 - 125 mg/dL    Alkaline Phosphatase 64 45 - 120 U/L    AST 70 (H) 0 - 40 U/L     (H) 0 - 45 U/L    Protein Total 6.1 6.0 - 8.0 g/dL    Albumin 3.0 (L) 3.5 - 5.0 g/dL    Bilirubin Total 0.6 0.0 - 1.0 mg/dL    GFR Estimate 86 >60 mL/min/1.73m2   INR    Collection Time: 07/07/22  5:57 AM   Result Value Ref Range    INR 1.01 0.85 - 1.15       MEDICATIONS:     Review of your medicines          Accurate as of July 11, 2022  7:12 PM. If you have any questions, ask your nurse or doctor.            CONTINUE these medicines which have NOT CHANGED      Dose / Directions   acetaminophen 325 MG tablet  Commonly known as: TYLENOL  Used for: Multiple closed pelvic fractures with disruption of pelvic [place], initial encounter (H)      Dose: 325-650 mg  Take 1-2 tablets (325-650 mg) by mouth every 6 hours as needed for mild pain  Quantity: 60 tablet  Refills: 0     atorvastatin 20 MG tablet  Commonly known as: LIPITOR      Dose: 20 mg  Take 20 mg by mouth At Bedtime  Refills: 0     calcitonin (salmon) 200 UNIT/ACT nasal spray  Commonly known as: MIACALCIN  Used for: Multiple closed pelvic fractures with disruption of pelvic [place], initial encounter (H), Age related osteoporosis, unspecified pathological fracture presence      Dose: 1 spray  Spray 1 spray into one nostril alternating nostrils daily Alternate nostril each day.  Quantity: 3.7 mL  Refills: 0     calcium citrate 950 (200 Ca) MG tablet  Commonly known as: CITRACAL      Dose: 1 tablet  Take 1 tablet by  mouth daily  Refills: 0     cyanocobalamin 100 MCG tablet  Commonly known as: VITAMIN B-12      Dose: 100 mcg  Take 100 mcg by mouth daily  Refills: 0     enoxaparin ANTICOAGULANT 40 MG/0.4ML syringe  Commonly known as: LOVENOX  Used for: Multiple closed pelvic fractures with disruption of pelvic [place], initial encounter (H)      Dose: 40 mg  Inject 0.4 mLs (40 mg) Subcutaneous every 24 hours for 30 days  Quantity: 12 mL  Refills: 0     gabapentin 300 MG capsule  Commonly known as: NEURONTIN      Dose: 300 mg  Take 300 mg by mouth At Bedtime  Refills: 0     oxyCODONE 5 MG tablet  Commonly known as: ROXICODONE  Used for: Multiple closed pelvic fractures with disruption of pelvic [place], initial encounter (H)      Dose: 2.5 mg  Take 0.5 tablets (2.5 mg) by mouth every 6 hours as needed for pain  Quantity: 6 tablet  Refills: 0     spironolactone 25 MG tablet  Commonly known as: ALDACTONE      Dose: 25 mg  Take 25 mg by mouth daily  Refills: 0     vitamin D3 50 mcg (2000 units) tablet  Commonly known as: CHOLECALCIFEROL  Used for: Multiple closed pelvic fractures with disruption of pelvic [place], initial encounter (H), Age related osteoporosis, unspecified pathological fracture presence      Dose: 1 tablet  Take 1 tablet (50 mcg) by mouth daily  Quantity: 30 tablet  Refills: 0            ASSESSMENT/PLAN  Encounter Diagnoses   Name Primary?     Closed displaced fracture of shaft of right clavicle with routine healing, subsequent encounter Yes     Closed fracture of superior ramus of right pubis with routine healing, subsequent encounter      Pain management      Physical deconditioning      Right clavicle fracture follow-up with orthopedics, nonoperative, pain control    Right pubic Rami fracture follow-up with orthopedics, pain control    Pain management Tylenol every 6 hours as needed hyperlipidemia on atorvastatin 20 mg at bedtime, gabapentin 300 mg at bedtime, oxycodone 2.5 mg every 6 hours as needed    Edema  spironolactone 25 mg 1 time a day    Anticoagulation management on enoxaparin 40 mg subcutaneous daily x30 days which will end on 8/7/2022    Electronically signed by: Angella Ward CNP        Sincerely,        Angella Ward CNP

## 2022-07-11 NOTE — PROGRESS NOTES
[facility] GERIATRIC SERVICES       Patient Helena Brown  MRN: [id number]        Reason for Visit     Chief Complaint   Patient presents with     RECHECK     INITIAL       Code Status     CPR/Full code     Assessment     Right superior and inferior pelvic rami fracture  Right sacral ala fracture  Right midshaft clavicular fracture  History of a mechanical fall  CAD status post pacemaker  Elevated transaminases  Thrombocytopenia  Osteoporosis  Generalized weakness    Plan     Pt is admitted to TCU for strengthening and rehab.  Patient admitted with multiple fractures.  Post fall  Fall felt to be mechanical.  Conservative treatment with no surgical intervention recommended.  Lovenox for DVT prophylaxis  Pain management optimized.  She is on calcitonin along with low-dose of oxycodone and Tylenol available as needed  -Rest  Orthopedics was consulted recommend PT OT as well as outpatient follow-up  Outpatient follow-up with her primary care will discuss initiation of bisphosphonate for management of osteo porosis  Recheck labs for monitoring of her thrombocytopenia.  Recheck labs were reviewed and her hemoglobin is 11.4 today.  Platelet count is now up to 65 which is normal.   is calling requesting a recheck in his liver functions.  Recheck transaminases levels in a week's time that will be ordered.  Etiology of both abnormalities is not clear at this time  She is reporting insomnia and takes melatonin at home.  Reorder melatonin 5 mg at bedtime as needed at her request.  Also wants to start fish oil supplementation.  She wants to stick to a particular brand was advised to bring her home supply and self administer if needed  Continue with PT/OT-has chronic gait instability due to inversion of both her feet left greater than right.  Advised to wear shoes on time.      History     Patient is a very pleasant 82 year old female who is admitted to TCU  Patient was admitted post mechanical fall.  Imaging revealed a  right superior and inferior pelvic rami fracture.  She also has a right sacral ala fracture and a mildly displaced right midshaft clavicular fracture.  Conservative treatment given.  She was noted to have thrombocytopenia.  This did improve somewhat.  Have recommended monitoring and outpatient work-up.  She had some elevated transaminases of unclear etiology.  This will also require monitoring.  She has osteoporosis noted and they have recommended outpatient work-up for an evaluation for bisphosphonate use.  Due to weakness discharged to the TCU    Past Medical & Surgical History     PAST MEDICAL HISTORY:   Past Medical History:   Diagnosis Date     Anxiety      Arthritis      Asthma      Colitis     Collagenous     Complete heart block (H)      Hypertriglyceridemia      Macular degeneration      Osteopenia      Osteoporosis      Pacemaker      Pulmonary embolism (H)      Restless legs      Skin cancer, basal cell       PAST SURGICAL HISTORY:   has a past surgical history that includes IR Extremity Venogram Left (7/25/2019); Breast Excisional Biopsy (Right, 8550-3745); Breast surgery; Bunionectomy; Arthroscopy shoulder rotator cuff repair; appendectomy; TOTAL KNEE ARTHROPLASTY (Left, 8/30/2018); Tonsillectomy; TOTAL KNEE ARTHROPLASTY (Right, 11/8/2018); Ep Pacemaker Insert (N/A, 3/29/2019); and Ir Extremity Venogram Left (7/25/2019).      Past Social History     Reviewed,  reports that she quit smoking about 61 years ago. She has a 1.00 pack-year smoking history. She has never used smokeless tobacco. She reports current alcohol use of about 7.0 standard drinks of alcohol per week. She reports that she does not use drugs.    Family History     Reviewed, and family history includes Alzheimer Disease in her father; Breast Cancer in her cousin; Heart Disease in her mother.    Medication List   Post Discharge Medication Reconciliation Status: Post Discharge Medication Reconciliation Status: discharge medications  reconciled, continue medications without change.  Current Outpatient Medications   Medication     acetaminophen (TYLENOL) 325 MG tablet     atorvastatin (LIPITOR) 20 MG tablet     calcitonin, salmon, (MIACALCIN) 200 UNIT/ACT nasal spray     calcium citrate (CITRACAL) 950 (200 Ca) MG tablet     cyanocobalamin (VITAMIN B-12) 100 MCG tablet     enoxaparin ANTICOAGULANT (LOVENOX) 40 MG/0.4ML syringe     gabapentin (NEURONTIN) 300 MG capsule     oxyCODONE (ROXICODONE) 5 MG tablet     spironolactone (ALDACTONE) 25 MG tablet     vitamin D3 (CHOLECALCIFEROL) 50 mcg (2000 units) tablet     No current facility-administered medications for this visit.          Allergies     Allergies   Allergen Reactions     Nsaids Other (See Comments)     Patient has history of microcolitis.  GI aggravation     Sulfa Drugs      Made her eyes stingy- it was eyedrops  Eye drops caused burning       Review of Systems   A comprehensive review of 14 systems was done. Pertinent findings noted here and in history of present illness. All the rest negative.  Constitutional: Negative.  Negative for fever, chills, she has  activity change, appetite change and fatigue.   HENT: Negative for congestion and facial swelling.    Eyes: Negative for photophobia, redness and visual disturbance.   Respiratory: Negative for cough and chest tightness.    Cardiovascular: Negative for chest pain, palpitations and leg swelling.   Gastrointestinal: Negative for nausea, diarrhea, constipation, blood in stool and abdominal distention.   Genitourinary: Negative.    Musculoskeletal: Reporting pain in her pelvic is controlled at rest  At baseline has chronic inversion of both her feet left greater than right  Skin: Negative.  Extensive bruising extending from her scalp down to her neck and the right side of her body  Reports that she has had multiple surgeries recently also due to arthritis  Neurological: Negative for dizziness, tremors, syncope, weakness, light-headedness  "and headaches.   Hematological: Does not bruise/bleed easily.   Psychiatric/Behavioral: Negative.  Mood is stable however does complain of insomnia intermittently      Physical Exam   /62   Pulse 61   Temp (!) 96.1  F (35.6  C)   Resp 20   Ht 1.575 m (5' 2\")   Wt 48.1 kg (106 lb)   SpO2 98%   BMI 19.39 kg/m       Constitutional: Oriented to person, place, and time and appears well-developed.   HEENT:  Normocephalic and atraumatic.  Eyes: Conjunctivae and EOM are normal. Pupils are equal, round, and reactive to light. No discharge.  No scleral icterus. Nose normal. Mouth/Throat: Oropharynx is clear and moist. No oropharyngeal exudate.    NECK: Normal range of motion. Neck supple. No JVD present. No tracheal deviation present. No thyromegaly present.   CARDIOVASCULAR: Normal rate, regular rhythm and intact distal pulses.  Exam reveals no gallop and no friction rub.  Systolic murmur present.  PULMONARY: Effort normal and breath sounds normal. No respiratory distress.No Wheezing or rales.  ABDOMEN: Soft. Bowel sounds are normal. No distension and no mass.  There is no tenderness. There is no rebound and no guarding. No HSM.  MUSCULOSKELETAL: Normal range of motion. No edema and no tenderness. Mild kyphosis, no tenderness.  Right upper extremity is in a sling.  Chronic inversion of both feet left greater than right noted with arthritis of the ankles  LYMPH NODES: Has no cervical, supraclavicular, axillary and groin adenopathy.   NEUROLOGICAL: Alert and oriented to person, place, and time. No cranial nerve deficit.  Normal muscle tone. Coordination normal.   GENITOURINARY: Deferred exam.  SKIN: Skin is warm and dry. No rash noted. No erythema. No pallor.   Extensive ecchymoses extending from the scalp to her neck and her shoulder area.   she also has a lot of ecchymoses on her right thigh  EXTREMITIES: No cyanosis, no clubbing, no edema. No Deformity.  PSYCHIATRIC: Normal mood, affect and behavior.  Reports " chronic insomnia      Lab Results     Recent Results (from the past 240 hour(s))   Asymptomatic COVID-19 Virus (Coronavirus) by PCR Nose    Collection Time: 07/04/22  7:50 PM    Specimen: Nose; Swab   Result Value Ref Range    SARS CoV2 PCR Negative Negative   Basic metabolic panel    Collection Time: 07/04/22  9:27 PM   Result Value Ref Range    Sodium 138 136 - 145 mmol/L    Potassium 3.9 3.5 - 5.0 mmol/L    Chloride 105 98 - 107 mmol/L    Carbon Dioxide (CO2) 24 22 - 31 mmol/L    Anion Gap 9 5 - 18 mmol/L    Urea Nitrogen 19 8 - 28 mg/dL    Creatinine 0.86 0.60 - 1.10 mg/dL    Calcium 9.1 8.5 - 10.5 mg/dL    Glucose 133 (H) 70 - 125 mg/dL    GFR Estimate 67 >60 mL/min/1.73m2   CBC with platelets and differential    Collection Time: 07/04/22  9:27 PM   Result Value Ref Range    WBC Count 9.5 4.0 - 11.0 10e3/uL    RBC Count 4.05 3.80 - 5.20 10e6/uL    Hemoglobin 12.2 11.7 - 15.7 g/dL    Hematocrit 36.7 35.0 - 47.0 %    MCV 91 78 - 100 fL    MCH 30.1 26.5 - 33.0 pg    MCHC 33.2 31.5 - 36.5 g/dL    RDW 13.2 10.0 - 15.0 %    Platelet Count 132 (L) 150 - 450 10e3/uL    % Neutrophils 84 %    % Lymphocytes 11 %    % Monocytes 5 %    % Eosinophils 0 %    % Basophils 0 %    % Immature Granulocytes 0 %    NRBCs per 100 WBC 0 <1 /100    Absolute Neutrophils 7.9 1.6 - 8.3 10e3/uL    Absolute Lymphocytes 1.1 0.8 - 5.3 10e3/uL    Absolute Monocytes 0.5 0.0 - 1.3 10e3/uL    Absolute Eosinophils 0.0 0.0 - 0.7 10e3/uL    Absolute Basophils 0.0 0.0 - 0.2 10e3/uL    Absolute Immature Granulocytes 0.0 <=0.4 10e3/uL    Absolute NRBCs 0.0 10e3/uL   Potassium    Collection Time: 07/05/22  4:20 AM   Result Value Ref Range    Potassium 4.1 3.5 - 5.0 mmol/L   Magnesium    Collection Time: 07/05/22  4:20 AM   Result Value Ref Range    Magnesium 1.7 (L) 1.8 - 2.6 mg/dL   Magnesium    Collection Time: 07/06/22  6:36 AM   Result Value Ref Range    Magnesium 1.7 (L) 1.8 - 2.6 mg/dL   Hepatic panel    Collection Time: 07/06/22  6:36 AM   Result  Value Ref Range    Bilirubin Total 0.7 0.0 - 1.0 mg/dL    Bilirubin Direct 0.2 <=0.5 mg/dL    Protein Total 6.3 6.0 - 8.0 g/dL    Albumin 3.1 (L) 3.5 - 5.0 g/dL    Alkaline Phosphatase 57 45 - 120 U/L    AST 93 (H) 0 - 40 U/L     (H) 0 - 45 U/L   CBC with platelets    Collection Time: 07/06/22  8:30 AM   Result Value Ref Range    WBC Count 6.5 4.0 - 11.0 10e3/uL    RBC Count 3.84 3.80 - 5.20 10e6/uL    Hemoglobin 11.5 (L) 11.7 - 15.7 g/dL    Hematocrit 34.9 (L) 35.0 - 47.0 %    MCV 91 78 - 100 fL    MCH 29.9 26.5 - 33.0 pg    MCHC 33.0 31.5 - 36.5 g/dL    RDW 13.6 10.0 - 15.0 %    Platelet Count 109 (L) 150 - 450 10e3/uL   Magnesium    Collection Time: 07/07/22  5:57 AM   Result Value Ref Range    Magnesium 1.7 (L) 1.8 - 2.6 mg/dL   CBC with platelets    Collection Time: 07/07/22  5:57 AM   Result Value Ref Range    WBC Count 6.3 4.0 - 11.0 10e3/uL    RBC Count 3.75 (L) 3.80 - 5.20 10e6/uL    Hemoglobin 11.1 (L) 11.7 - 15.7 g/dL    Hematocrit 34.0 (L) 35.0 - 47.0 %    MCV 91 78 - 100 fL    MCH 29.6 26.5 - 33.0 pg    MCHC 32.6 31.5 - 36.5 g/dL    RDW 13.5 10.0 - 15.0 %    Platelet Count 111 (L) 150 - 450 10e3/uL   Comprehensive metabolic panel    Collection Time: 07/07/22  5:57 AM   Result Value Ref Range    Sodium 136 136 - 145 mmol/L    Potassium 4.1 3.5 - 5.0 mmol/L    Chloride 106 98 - 107 mmol/L    Carbon Dioxide (CO2) 22 22 - 31 mmol/L    Anion Gap 8 5 - 18 mmol/L    Urea Nitrogen 13 8 - 28 mg/dL    Creatinine 0.69 0.60 - 1.10 mg/dL    Calcium 8.9 8.5 - 10.5 mg/dL    Glucose 94 70 - 125 mg/dL    Alkaline Phosphatase 64 45 - 120 U/L    AST 70 (H) 0 - 40 U/L     (H) 0 - 45 U/L    Protein Total 6.1 6.0 - 8.0 g/dL    Albumin 3.0 (L) 3.5 - 5.0 g/dL    Bilirubin Total 0.6 0.0 - 1.0 mg/dL    GFR Estimate 86 >60 mL/min/1.73m2   INR    Collection Time: 07/07/22  5:57 AM   Result Value Ref Range    INR 1.01 0.85 - 1.15             Imaging Results     XR Pelvis and Hip Right 2 Views    Result Date:  7/4/2022  EXAM: XR PELVIS AND HIP RIGHT 2 VIEWS LOCATION: [facility] DATE/TIME: 7/4/2022 6:48 PM INDICATION: Injury, pain COMPARISON: None.     IMPRESSION: Acute displaced fracture of the right superior pubic ramus laterally along with femoroacetabular junction. Acute mildly displaced fracture of the right inferior pubic ramus. Mild deformity right sacral ala could represent fracture. No proximal femur fracture. Mild degenerative arthritis right hip joint. Degenerative changes lumbar spine. Pelvic calcifications suggestive of calcified uterine fibroids.    XR Shoulder Right 2 Views    Addendum Date: 7/4/2022    On the prior report the impression was was interosseous has chronic into the indication feels. Corrected report follows. EXAM: XR SHOULDER 2 VIEW RIGHT LOCATION: [facility] DATE/TIME: 7/4/2022 6:47 PM INDICATION: Injury, pain COMPARISON: None. IMPRESSION: Acute mildly comminuted fracture of the midshaft of the clavicle with apex superior angulation and mild superior displacement of the distal fragment. Darden in the humeral head. No dislocation. Mild degenerative arthritis of the glenohumeral joint.     Result Date: 7/4/2022  EXAM: XR SHOULDER 2 VIEW RIGHT LOCATION: [facility] DATE/TIME: 7/4/2022 6:47 PM INDICATION: Injury, pain acute mildly comminuted fracture of the midshaft of the clavicle with apex superior angulation and mild superior displacement of the distal fragment. Darden in the humeral head. No dislocation. Mild degenerative arthritis of the glenohumeral joint. COMPARISON: None.     IMPRESSION: Normal joint spaces and alignment. No fracture.     Cervical spine CT w/o contrast    Addendum Date: 7/4/2022    Addendum: There is an acute fracture of the mid aspect of the right clavicle.    Result Date: 7/4/2022  EXAM: CT CERVICAL SPINE W/O CONTRAST LOCATION: [facility] DATE/TIME: 7/4/2022 6:35 PM  INDICATION: Neck pain. COMPARISON: None. TECHNIQUE: Routine CT Cervical Spine without IV contrast. Multiplanar reformats. Dose reduction techniques were used. FINDINGS: VERTEBRA: Osteopenia. Cervical vertebral body heights are maintained. Grade 1 anterolisthesis C2 on C3, C3 on C4, and C7 on T1. Severe degenerative changes at C1-C2. No acute cervical spine fracture.  CANAL/FORAMINA: No canal or neural foraminal stenosis. PARASPINAL: No extraspinal abnormality.     IMPRESSION: 1.  No acute cervical spine fracture.    Head CT w/o contrast    Result Date: 7/4/2022  EXAM: CT HEAD W/O CONTRAST LOCATION: [facility] [place] DATE/TIME: 7/4/2022 6:35 PM INDICATION: Head injury. COMPARISON: None. TECHNIQUE: Routine CT Head without IV contrast. Multiplanar reformats. Dose reduction techniques were used. FINDINGS: INTRACRANIAL CONTENTS: No intracranial hemorrhage, extraaxial collection, or mass effect.  No CT evidence of acute infarct. Mild presumed chronic small vessel ischemic changes. Mild generalized volume loss. No hydrocephalus. VISUALIZED ORBITS/SINUSES/MASTOIDS: Prior bilateral cataract surgery. Visualized portions of the orbits are otherwise unremarkable. No paranasal sinus mucosal disease. No middle ear or mastoid effusion. BONES/SOFT TISSUES: Right parietal scalp hematoma. No acute calvarial abnormality.     IMPRESSION: 1.  No acute intracranial hemorrhage. 2.  Right parietal scalp hematoma without underlying calvarial fracture.    CT Pelvis Bone wo Contrast    Result Date: 7/5/2022  EXAM: CT PELVIS BONE WO CONTRAST LOCATION: [facility] [place] DATE/TIME: 7/5/2022 3:05 PM INDICATION: 82-year-old patient with right pelvic fractures. COMPARISON: 07/04/2022 radiographs. TECHNIQUE: CT scan of the pelvis was performed without IV contrast. Multiplanar reformats were obtained. Dose reduction techniques were used. CONTRAST: None. FINDINGS: BONES: -Minimally displaced sagittal oblique  fracture of the right sacral ala (zone 1). There is fracture extension into the right sacroiliac joint. -Mildly displaced and comminuted fracture at the junction of the right superior pubic ramus and anterior acetabulum. -Minimally displaced fracture of the right inferior pubic ramus. -Bone demineralization. JOINTS: -Moderate-advanced right hip degenerative arthrosis. This includes joint space narrowing, marginal osteophytosis, and subchondral cystic changes. Small right hip joint effusion. -Degenerative disc disease L3-L4, L4-L5, and L5-S1. Low-grade degenerative anterolisthesis of L4 on L5 and L5 on S1. Advanced lower lumbar facet arthrosis. -Mild bilateral sacroiliac degenerative arthrosis. -Mild left hip degenerative arthrosis. MUSCLES AND SOFT TISSUES: -Mild asymmetric enlargement of the right piriformis, obturator internus, and short hip adductor musculature. -Reticulated soft tissue thickening in the right medial pelvis. No discrete free fluid is evident. No soft tissue fluid collection. OTHER: -Calcified uterine fibroids. -Atherosclerotic calcification.     IMPRESSION: 1.  Minimally displaced fracture of the right sacral ala (zone 1). 2.  Mildly displaced and comminuted fracture of the right superior pubic ramus-anterior acetabular junction. Minimally displaced fracture of the right inferior pubic ramus. 3.  Mild enlargement of the right pelvic and medial thigh musculature, consistent with acute muscle strains. 4.  Soft tissue thickening-edema in the right pelvis. 5.  Moderate-advanced right hip degenerative arthrosis. 6.  Advanced lower lumbar degenerative changes.          Electronically signed by    Sania Alba MD

## 2022-07-12 ENCOUNTER — TELEPHONE (OUTPATIENT)
Dept: GERIATRICS | Facility: CLINIC | Age: 82
End: 2022-07-12

## 2022-07-12 ENCOUNTER — TRANSITIONAL CARE UNIT VISIT (OUTPATIENT)
Dept: GERIATRICS | Facility: CLINIC | Age: 82
End: 2022-07-12
Payer: MEDICARE

## 2022-07-12 VITALS
TEMPERATURE: 96.1 F | WEIGHT: 106 LBS | DIASTOLIC BLOOD PRESSURE: 62 MMHG | SYSTOLIC BLOOD PRESSURE: 120 MMHG | OXYGEN SATURATION: 98 % | BODY MASS INDEX: 19.51 KG/M2 | HEART RATE: 61 BPM | HEIGHT: 62 IN | RESPIRATION RATE: 20 BRPM

## 2022-07-12 DIAGNOSIS — S42.021D CLOSED DISPLACED FRACTURE OF SHAFT OF RIGHT CLAVICLE WITH ROUTINE HEALING, SUBSEQUENT ENCOUNTER: ICD-10-CM

## 2022-07-12 DIAGNOSIS — S32.591D CLOSED FRACTURE OF RIGHT INFERIOR PUBIC RAMUS WITH ROUTINE HEALING, SUBSEQUENT ENCOUNTER: ICD-10-CM

## 2022-07-12 DIAGNOSIS — Z95.0 S/P PLACEMENT OF CARDIAC PACEMAKER: ICD-10-CM

## 2022-07-12 DIAGNOSIS — D69.6 THROMBOCYTOPENIA (H): ICD-10-CM

## 2022-07-12 DIAGNOSIS — S32.511D CLOSED FRACTURE OF SUPERIOR RAMUS OF RIGHT PUBIS WITH ROUTINE HEALING, SUBSEQUENT ENCOUNTER: Primary | ICD-10-CM

## 2022-07-12 LAB
ANION GAP SERPL CALCULATED.3IONS-SCNC: 13 MMOL/L (ref 7–15)
BUN SERPL-MCNC: 22.7 MG/DL (ref 8–23)
CALCIUM SERPL-MCNC: 9.3 MG/DL (ref 8.8–10.2)
CHLORIDE SERPL-SCNC: 101 MMOL/L (ref 98–107)
CREAT SERPL-MCNC: 0.63 MG/DL (ref 0.51–0.95)
DEPRECATED HCO3 PLAS-SCNC: 23 MMOL/L (ref 22–29)
ERYTHROCYTE [DISTWIDTH] IN BLOOD BY AUTOMATED COUNT: 14.3 % (ref 10–15)
GFR SERPL CREATININE-BSD FRML MDRD: 88 ML/MIN/1.73M2
GLUCOSE SERPL-MCNC: 78 MG/DL (ref 70–99)
HCT VFR BLD AUTO: 35.4 % (ref 35–47)
HGB BLD-MCNC: 11.4 G/DL (ref 11.7–15.7)
MAGNESIUM SERPL-MCNC: 2.1 MG/DL (ref 1.7–2.3)
MCH RBC QN AUTO: 30.3 PG (ref 26.5–33)
MCHC RBC AUTO-ENTMCNC: 32.2 G/DL (ref 31.5–36.5)
MCV RBC AUTO: 94 FL (ref 78–100)
PLATELET # BLD AUTO: 265 10E3/UL (ref 150–450)
POTASSIUM SERPL-SCNC: 4.4 MMOL/L (ref 3.4–5.3)
RBC # BLD AUTO: 3.76 10E6/UL (ref 3.8–5.2)
SODIUM SERPL-SCNC: 137 MMOL/L (ref 136–145)
WBC # BLD AUTO: 6.4 10E3/UL (ref 4–11)

## 2022-07-12 PROCEDURE — 99305 1ST NF CARE MODERATE MDM 35: CPT | Performed by: FAMILY MEDICINE

## 2022-07-12 PROCEDURE — 83735 ASSAY OF MAGNESIUM: CPT | Performed by: NURSE PRACTITIONER

## 2022-07-12 PROCEDURE — 36415 COLL VENOUS BLD VENIPUNCTURE: CPT | Mod: ORL | Performed by: NURSE PRACTITIONER

## 2022-07-12 PROCEDURE — 80048 BASIC METABOLIC PNL TOTAL CA: CPT | Mod: ORL | Performed by: NURSE PRACTITIONER

## 2022-07-12 PROCEDURE — 85027 COMPLETE CBC AUTOMATED: CPT | Mod: ORL | Performed by: NURSE PRACTITIONER

## 2022-07-12 PROCEDURE — P9604 ONE-WAY ALLOW PRORATED TRIP: HCPCS | Performed by: NURSE PRACTITIONER

## 2022-07-12 NOTE — TELEPHONE ENCOUNTER
[facility] Lab Note     Provider: Sania Alba MD  Facility: [facility]  Facility Type:  TCU    Allergies   Allergen Reactions     Nsaids Other (See Comments)     Patient has history of microcolitis.  GI aggravation     Sulfa Drugs      Made her eyes stingy- it was eyedrops  Eye drops caused burning       Labs Reviewed by provider: Heme 2, BMP, Mg     Verbal Order/Direction given by Provider: No new orders.      Provider giving Order:  Sania Alba MD    Verbal Order given to: Mariola([phone])    Paulo Hassan RN

## 2022-07-12 NOTE — LETTER
7/12/2022        RE: Helena Brown  [address] [facility] [address]        [facility] GERIATRIC [facility]       Patient Helena Brown  MRN: [id number]        Reason for Visit     Chief Complaint   Patient presents with     RECHECK     INITIAL       Code Status     CPR/Full code     Assessment     Right superior and inferior pelvic rami fracture  Right sacral ala fracture  Right midshaft clavicular fracture  History of a mechanical fall  CAD status post pacemaker  Elevated transaminases  Thrombocytopenia  Osteoporosis  Generalized weakness    Plan     Pt is admitted to TCU for strengthening and rehab.  Patient admitted with multiple fractures.  Post fall  Fall felt to be mechanical.  Conservative treatment with no surgical intervention recommended.  Lovenox for DVT prophylaxis  Pain management optimized.  She is on calcitonin along with low-dose of oxycodone and Tylenol available as needed  -Rest  Orthopedics was consulted recommend PT OT as well as outpatient follow-up  Outpatient follow-up with her primary care will discuss initiation of bisphosphonate for management of osteo porosis  Recheck labs for monitoring of her thrombocytopenia.  Recheck labs were reviewed and her hemoglobin is 11.4 today.  Platelet count is now up to 65 which is normal.   is calling requesting a recheck in his liver functions.  Recheck transaminases levels in a week's time that will be ordered.  Etiology of both abnormalities is not clear at this time  She is reporting insomnia and takes melatonin at home.  Reorder melatonin 5 mg at bedtime as needed at her request.  Also wants to start fish oil supplementation.  She wants to stick to a particular brand was advised to bring her home supply and self administer if needed  Continue with PT/OT-has chronic gait instability due to inversion of both her feet left greater than right.  Advised to wear shoes on time.      History     Patient is a very pleasant 82 year old female who is  admitted to TCU  Patient was admitted post mechanical fall.  Imaging revealed a right superior and inferior pelvic rami fracture.  She also has a right sacral ala fracture and a mildly displaced right midshaft clavicular fracture.  Conservative treatment given.  She was noted to have thrombocytopenia.  This did improve somewhat.  Have recommended monitoring and outpatient work-up.  She had some elevated transaminases of unclear etiology.  This will also require monitoring.  She has osteoporosis noted and they have recommended outpatient work-up for an evaluation for bisphosphonate use.  Due to weakness discharged to the TCU    Past Medical & Surgical History     PAST MEDICAL HISTORY:   Past Medical History:   Diagnosis Date     Anxiety      Arthritis      Asthma      Colitis     Collagenous     Complete heart block (H)      Hypertriglyceridemia      Macular degeneration      Osteopenia      Osteoporosis      Pacemaker      Pulmonary embolism (H)      Restless legs      Skin cancer, basal cell       PAST SURGICAL HISTORY:   has a past surgical history that includes IR Extremity Venogram Left (7/25/2019); Breast Excisional Biopsy (Right, 8050-7834); Breast surgery; Bunionectomy; Arthroscopy shoulder rotator cuff repair; appendectomy; TOTAL KNEE ARTHROPLASTY (Left, 8/30/2018); Tonsillectomy; TOTAL KNEE ARTHROPLASTY (Right, 11/8/2018); Ep Pacemaker Insert (N/A, 3/29/2019); and Ir Extremity Venogram Left (7/25/2019).      Past Social History     Reviewed,  reports that she quit smoking about 61 years ago. She has a 1.00 pack-year smoking history. She has never used smokeless tobacco. She reports current alcohol use of about 7.0 standard drinks of alcohol per week. She reports that she does not use drugs.    Family History     Reviewed, and family history includes Alzheimer Disease in her father; Breast Cancer in her cousin; Heart Disease in her mother.    Medication List   Post Discharge Medication Reconciliation Status:  Post Discharge Medication Reconciliation Status: discharge medications reconciled, continue medications without change.  Current Outpatient Medications   Medication     acetaminophen (TYLENOL) 325 MG tablet     atorvastatin (LIPITOR) 20 MG tablet     calcitonin, salmon, (MIACALCIN) 200 UNIT/ACT nasal spray     calcium citrate (CITRACAL) 950 (200 Ca) MG tablet     cyanocobalamin (VITAMIN B-12) 100 MCG tablet     enoxaparin ANTICOAGULANT (LOVENOX) 40 MG/0.4ML syringe     gabapentin (NEURONTIN) 300 MG capsule     oxyCODONE (ROXICODONE) 5 MG tablet     spironolactone (ALDACTONE) 25 MG tablet     vitamin D3 (CHOLECALCIFEROL) 50 mcg (2000 units) tablet     No current facility-administered medications for this visit.          Allergies     Allergies   Allergen Reactions     Nsaids Other (See Comments)     Patient has history of microcolitis.  GI aggravation     Sulfa Drugs      Made her eyes stingy- it was eyedrops  Eye drops caused burning       Review of Systems   A comprehensive review of 14 systems was done. Pertinent findings noted here and in history of present illness. All the rest negative.  Constitutional: Negative.  Negative for fever, chills, she has  activity change, appetite change and fatigue.   HENT: Negative for congestion and facial swelling.    Eyes: Negative for photophobia, redness and visual disturbance.   Respiratory: Negative for cough and chest tightness.    Cardiovascular: Negative for chest pain, palpitations and leg swelling.   Gastrointestinal: Negative for nausea, diarrhea, constipation, blood in stool and abdominal distention.   Genitourinary: Negative.    Musculoskeletal: Reporting pain in her pelvic is controlled at rest  At baseline has chronic inversion of both her feet left greater than right  Skin: Negative.  Extensive bruising extending from her scalp down to her neck and the right side of her body  Reports that she has had multiple surgeries recently also due to  "arthritis  Neurological: Negative for dizziness, tremors, syncope, weakness, light-headedness and headaches.   Hematological: Does not bruise/bleed easily.   Psychiatric/Behavioral: Negative.  Mood is stable however does complain of insomnia intermittently      Physical Exam   /62   Pulse 61   Temp (!) 96.1  F (35.6  C)   Resp 20   Ht 1.575 m (5' 2\")   Wt 48.1 kg (106 lb)   SpO2 98%   BMI 19.39 kg/m       Constitutional: Oriented to person, place, and time and appears well-developed.   HEENT:  Normocephalic and atraumatic.  Eyes: Conjunctivae and EOM are normal. Pupils are equal, round, and reactive to light. No discharge.  No scleral icterus. Nose normal. Mouth/Throat: Oropharynx is clear and moist. No oropharyngeal exudate.    NECK: Normal range of motion. Neck supple. No JVD present. No tracheal deviation present. No thyromegaly present.   CARDIOVASCULAR: Normal rate, regular rhythm and intact distal pulses.  Exam reveals no gallop and no friction rub.  Systolic murmur present.  PULMONARY: Effort normal and breath sounds normal. No respiratory distress.No Wheezing or rales.  ABDOMEN: Soft. Bowel sounds are normal. No distension and no mass.  There is no tenderness. There is no rebound and no guarding. No HSM.  MUSCULOSKELETAL: Normal range of motion. No edema and no tenderness. Mild kyphosis, no tenderness.  Right upper extremity is in a sling.  Chronic inversion of both feet left greater than right noted with arthritis of the ankles  LYMPH NODES: Has no cervical, supraclavicular, axillary and groin adenopathy.   NEUROLOGICAL: Alert and oriented to person, place, and time. No cranial nerve deficit.  Normal muscle tone. Coordination normal.   GENITOURINARY: Deferred exam.  SKIN: Skin is warm and dry. No rash noted. No erythema. No pallor.   Extensive ecchymoses extending from the scalp to her neck and her shoulder area.   she also has a lot of ecchymoses on her right thigh  EXTREMITIES: No cyanosis, " no clubbing, no edema. No Deformity.  PSYCHIATRIC: Normal mood, affect and behavior.  Reports chronic insomnia      Lab Results     Recent Results (from the past 240 hour(s))   Asymptomatic COVID-19 Virus (Coronavirus) by PCR Nose    Collection Time: 07/04/22  7:50 PM    Specimen: Nose; Swab   Result Value Ref Range    SARS CoV2 PCR Negative Negative   Basic metabolic panel    Collection Time: 07/04/22  9:27 PM   Result Value Ref Range    Sodium 138 136 - 145 mmol/L    Potassium 3.9 3.5 - 5.0 mmol/L    Chloride 105 98 - 107 mmol/L    Carbon Dioxide (CO2) 24 22 - 31 mmol/L    Anion Gap 9 5 - 18 mmol/L    Urea Nitrogen 19 8 - 28 mg/dL    Creatinine 0.86 0.60 - 1.10 mg/dL    Calcium 9.1 8.5 - 10.5 mg/dL    Glucose 133 (H) 70 - 125 mg/dL    GFR Estimate 67 >60 mL/min/1.73m2   CBC with platelets and differential    Collection Time: 07/04/22  9:27 PM   Result Value Ref Range    WBC Count 9.5 4.0 - 11.0 10e3/uL    RBC Count 4.05 3.80 - 5.20 10e6/uL    Hemoglobin 12.2 11.7 - 15.7 g/dL    Hematocrit 36.7 35.0 - 47.0 %    MCV 91 78 - 100 fL    MCH 30.1 26.5 - 33.0 pg    MCHC 33.2 31.5 - 36.5 g/dL    RDW 13.2 10.0 - 15.0 %    Platelet Count 132 (L) 150 - 450 10e3/uL    % Neutrophils 84 %    % Lymphocytes 11 %    % Monocytes 5 %    % Eosinophils 0 %    % Basophils 0 %    % Immature Granulocytes 0 %    NRBCs per 100 WBC 0 <1 /100    Absolute Neutrophils 7.9 1.6 - 8.3 10e3/uL    Absolute Lymphocytes 1.1 0.8 - 5.3 10e3/uL    Absolute Monocytes 0.5 0.0 - 1.3 10e3/uL    Absolute Eosinophils 0.0 0.0 - 0.7 10e3/uL    Absolute Basophils 0.0 0.0 - 0.2 10e3/uL    Absolute Immature Granulocytes 0.0 <=0.4 10e3/uL    Absolute NRBCs 0.0 10e3/uL   Potassium    Collection Time: 07/05/22  4:20 AM   Result Value Ref Range    Potassium 4.1 3.5 - 5.0 mmol/L   Magnesium    Collection Time: 07/05/22  4:20 AM   Result Value Ref Range    Magnesium 1.7 (L) 1.8 - 2.6 mg/dL   Magnesium    Collection Time: 07/06/22  6:36 AM   Result Value Ref Range     Magnesium 1.7 (L) 1.8 - 2.6 mg/dL   Hepatic panel    Collection Time: 07/06/22  6:36 AM   Result Value Ref Range    Bilirubin Total 0.7 0.0 - 1.0 mg/dL    Bilirubin Direct 0.2 <=0.5 mg/dL    Protein Total 6.3 6.0 - 8.0 g/dL    Albumin 3.1 (L) 3.5 - 5.0 g/dL    Alkaline Phosphatase 57 45 - 120 U/L    AST 93 (H) 0 - 40 U/L     (H) 0 - 45 U/L   CBC with platelets    Collection Time: 07/06/22  8:30 AM   Result Value Ref Range    WBC Count 6.5 4.0 - 11.0 10e3/uL    RBC Count 3.84 3.80 - 5.20 10e6/uL    Hemoglobin 11.5 (L) 11.7 - 15.7 g/dL    Hematocrit 34.9 (L) 35.0 - 47.0 %    MCV 91 78 - 100 fL    MCH 29.9 26.5 - 33.0 pg    MCHC 33.0 31.5 - 36.5 g/dL    RDW 13.6 10.0 - 15.0 %    Platelet Count 109 (L) 150 - 450 10e3/uL   Magnesium    Collection Time: 07/07/22  5:57 AM   Result Value Ref Range    Magnesium 1.7 (L) 1.8 - 2.6 mg/dL   CBC with platelets    Collection Time: 07/07/22  5:57 AM   Result Value Ref Range    WBC Count 6.3 4.0 - 11.0 10e3/uL    RBC Count 3.75 (L) 3.80 - 5.20 10e6/uL    Hemoglobin 11.1 (L) 11.7 - 15.7 g/dL    Hematocrit 34.0 (L) 35.0 - 47.0 %    MCV 91 78 - 100 fL    MCH 29.6 26.5 - 33.0 pg    MCHC 32.6 31.5 - 36.5 g/dL    RDW 13.5 10.0 - 15.0 %    Platelet Count 111 (L) 150 - 450 10e3/uL   Comprehensive metabolic panel    Collection Time: 07/07/22  5:57 AM   Result Value Ref Range    Sodium 136 136 - 145 mmol/L    Potassium 4.1 3.5 - 5.0 mmol/L    Chloride 106 98 - 107 mmol/L    Carbon Dioxide (CO2) 22 22 - 31 mmol/L    Anion Gap 8 5 - 18 mmol/L    Urea Nitrogen 13 8 - 28 mg/dL    Creatinine 0.69 0.60 - 1.10 mg/dL    Calcium 8.9 8.5 - 10.5 mg/dL    Glucose 94 70 - 125 mg/dL    Alkaline Phosphatase 64 45 - 120 U/L    AST 70 (H) 0 - 40 U/L     (H) 0 - 45 U/L    Protein Total 6.1 6.0 - 8.0 g/dL    Albumin 3.0 (L) 3.5 - 5.0 g/dL    Bilirubin Total 0.6 0.0 - 1.0 mg/dL    GFR Estimate 86 >60 mL/min/1.73m2   INR    Collection Time: 07/07/22  5:57 AM   Result Value Ref Range    INR 1.01 0.85  - 1.15             Imaging Results     XR Pelvis and Hip Right 2 Views    Result Date: 7/4/2022  EXAM: XR PELVIS AND HIP RIGHT 2 VIEWS LOCATION: [facility] DATE/TIME: 7/4/2022 6:48 PM INDICATION: Injury, pain COMPARISON: None.     IMPRESSION: Acute displaced fracture of the right superior pubic ramus laterally along with femoroacetabular junction. Acute mildly displaced fracture of the right inferior pubic ramus. Mild deformity right sacral ala could represent fracture. No proximal femur fracture. Mild degenerative arthritis right hip joint. Degenerative changes lumbar spine. Pelvic calcifications suggestive of calcified uterine fibroids.    XR Shoulder Right 2 Views    Addendum Date: 7/4/2022    On the prior report the impression was was interosseous has chronic into the indication feels. Corrected report follows. EXAM: XR SHOULDER 2 VIEW RIGHT LOCATION: [facility] DATE/TIME: 7/4/2022 6:47 PM INDICATION: Injury, pain COMPARISON: None. IMPRESSION: Acute mildly comminuted fracture of the midshaft of the clavicle with apex superior angulation and mild superior displacement of the distal fragment. [place] in the humeral head. No dislocation. Mild degenerative arthritis of the glenohumeral joint.     Result Date: 7/4/2022  EXAM: XR SHOULDER 2 VIEW RIGHT LOCATION: [facility] DATE/TIME: 7/4/2022 6:47 PM INDICATION: Injury, pain acute mildly comminuted fracture of the midshaft of the clavicle with apex superior angulation and mild superior displacement of the distal fragment. [place] in the humeral head. No dislocation. Mild degenerative arthritis of the glenohumeral joint. COMPARISON: None.     IMPRESSION: Normal joint spaces and alignment. No fracture.     Cervical spine CT w/o contrast    Addendum Date: 7/4/2022    Addendum: There is an acute fracture of the mid aspect of the right clavicle.    Result Date: 7/4/2022  EXAM: CT CERVICAL SPINE W/O  CONTRAST LOCATION: [facility] DATE/TIME: 7/4/2022 6:35 PM INDICATION: Neck pain. COMPARISON: None. TECHNIQUE: Routine CT Cervical Spine without IV contrast. Multiplanar reformats. Dose reduction techniques were used. FINDINGS: VERTEBRA: Osteopenia. Cervical vertebral body heights are maintained. Grade 1 anterolisthesis C2 on C3, C3 on C4, and C7 on T1. Severe degenerative changes at C1-C2. No acute cervical spine fracture.  CANAL/FORAMINA: No canal or neural foraminal stenosis. PARASPINAL: No extraspinal abnormality.     IMPRESSION: 1.  No acute cervical spine fracture.    Head CT w/o contrast    Result Date: 7/4/2022  EXAM: CT HEAD W/O CONTRAST LOCATION: [facility] DATE/TIME: 7/4/2022 6:35 PM INDICATION: Head injury. COMPARISON: None. TECHNIQUE: Routine CT Head without IV contrast. Multiplanar reformats. Dose reduction techniques were used. FINDINGS: INTRACRANIAL CONTENTS: No intracranial hemorrhage, extraaxial collection, or mass effect.  No CT evidence of acute infarct. Mild presumed chronic small vessel ischemic changes. Mild generalized volume loss. No hydrocephalus. VISUALIZED ORBITS/SINUSES/MASTOIDS: Prior bilateral cataract surgery. Visualized portions of the orbits are otherwise unremarkable. No paranasal sinus mucosal disease. No middle ear or mastoid effusion. BONES/SOFT TISSUES: Right parietal scalp hematoma. No acute calvarial abnormality.     IMPRESSION: 1.  No acute intracranial hemorrhage. 2.  Right parietal scalp hematoma without underlying calvarial fracture.    CT Pelvis Bone wo Contrast    Result Date: 7/5/2022  EXAM: CT PELVIS BONE WO CONTRAST LOCATION: [facility] DATE/TIME: 7/5/2022 3:05 PM INDICATION: 82-year-old patient with right pelvic fractures. COMPARISON: 07/04/2022 radiographs. TECHNIQUE: CT scan of the pelvis was performed without IV contrast. Multiplanar reformats were obtained. Dose reduction techniques  were used. CONTRAST: None. FINDINGS: BONES: -Minimally displaced sagittal oblique fracture of the right sacral ala (zone 1). There is fracture extension into the right sacroiliac joint. -Mildly displaced and comminuted fracture at the junction of the right superior pubic ramus and anterior acetabulum. -Minimally displaced fracture of the right inferior pubic ramus. -Bone demineralization. JOINTS: -Moderate-advanced right hip degenerative arthrosis. This includes joint space narrowing, marginal osteophytosis, and subchondral cystic changes. Small right hip joint effusion. -Degenerative disc disease L3-L4, L4-L5, and L5-S1. Low-grade degenerative anterolisthesis of L4 on L5 and L5 on S1. Advanced lower lumbar facet arthrosis. -Mild bilateral sacroiliac degenerative arthrosis. -Mild left hip degenerative arthrosis. MUSCLES AND SOFT TISSUES: -Mild asymmetric enlargement of the right piriformis, obturator internus, and short hip adductor musculature. -Reticulated soft tissue thickening in the right medial pelvis. No discrete free fluid is evident. No soft tissue fluid collection. OTHER: -Calcified uterine fibroids. -Atherosclerotic calcification.     IMPRESSION: 1.  Minimally displaced fracture of the right sacral ala (zone 1). 2.  Mildly displaced and comminuted fracture of the right superior pubic ramus-anterior acetabular junction. Minimally displaced fracture of the right inferior pubic ramus. 3.  Mild enlargement of the right pelvic and medial thigh musculature, consistent with acute muscle strains. 4.  Soft tissue thickening-edema in the right pelvis. 5.  Moderate-advanced right hip degenerative arthrosis. 6.  Advanced lower lumbar degenerative changes.          Electronically signed by    Sania Alba MD                             Sincerely,        MARIANELA Robertson

## 2022-07-12 NOTE — PROGRESS NOTES
[facility] GERIATRIC SERVICES  Chief Complaint   Patient presents with     [facility] F/U     [place] Medical Record Number:  [id number]  Place of Service where encounter took place:  [facility] ([facility]) [[id number]]  Code Status: Full    HISTORY:      HPI:  Helena Brown  is 82 year old (1940) undergoing physical and occupational therapy. Excerpted from records She  was admitted on 7/4/2022 for right superior and inferior pelvic rami fractures, right sacral ala fracture, and mild displaced right midshaft clavicle fracture. She has a PMHx of PE in 2019 but is currently not on anticoagulation, as well as CAD s/p pacemaker for complete heart block in 2018, and osteoporosis. Labs on admission showed thrombocytopenia and hepatic panel showed elevated transaminases and decreased albumin. Transaminases and thrombocytopenia were followed and improved by the day of discharge. Orthopedics was consulted and they recommended non-operative management.  She was transferred to [facility] on 7/7, and will follow up with orthopedics in 3 weeks. She will also follow up with her primary care provider for initiation of bisphosphonate therapy for her osteoporosis in 2 weeks, as well as for further evaluation of her thrombocytopenia.    Today she was seen to review vital signs, labs, a routine visit and to establish care.  She denied chest pain shortness of breath cough or congestion.  She did report having two loose stools yesterday and does have a history of colitis per her report.  She reports her pain is controlled when she does not move a lot.  Her labs were reviewed and her AST and ALT have improved.  AST 70 they will from 93,  down from 129, hemoglobin 11.1, continues to be thrombocytopenic at 111 slightly up from 109    ALLERGIES:Nsaids and Sulfa drugs    PAST MEDICAL HISTORY:   Past Medical History:   Diagnosis Date     Anxiety      Arthritis      Asthma      Colitis     Collagenous     Complete  heart block (H)      Hypertriglyceridemia      Macular degeneration      Osteopenia      Osteoporosis      Pacemaker      Pulmonary embolism (H)      Restless legs      Skin cancer, basal cell        PAST SURGICAL HISTORY:   has a past surgical history that includes IR Extremity Venogram Left (7/25/2019); Breast Excisional Biopsy (Right, 2041-7731); Breast surgery; Bunionectomy; Arthroscopy shoulder rotator cuff repair; appendectomy; TOTAL KNEE ARTHROPLASTY (Left, 8/30/2018); Tonsillectomy; TOTAL KNEE ARTHROPLASTY (Right, 11/8/2018); Ep Pacemaker Insert (N/A, 3/29/2019); and Ir Extremity Venogram Left (7/25/2019).    FAMILY HISTORY: family history includes Alzheimer Disease in her father; Breast Cancer in her cousin; Heart Disease in her mother.    SOCIAL HISTORY:  reports that she quit smoking about 61 years ago. She has a 1.00 pack-year smoking history. She has never used smokeless tobacco. She reports current alcohol use of about 7.0 standard drinks of alcohol per week. She reports that she does not use drugs.    ROS:  Constitutional: Negative for activity change, appetite change, fatigue and fever.   HENT: Negative for congestion.    Respiratory: Negative for cough, shortness of breath and wheezing.    Cardiovascular: Negative for chest pain and leg swelling.   Gastrointestinal: Negative for abdominal distention, abdominal pain, constipation, diarrhea and nausea.   Genitourinary: Negative for dysuria.   Musculoskeletal: Negative for arthralgia. Negative for back pain.  Right pubic Ramifracture, right clavicle fracture nonoperative  Skin: Negative for color change and wound.   Neurological: Negative for dizziness.   Psychiatric/Behavioral: Negative for agitation, behavioral problems and confusion.     Physical Exam:  Constitutional:       Appearance: Patient is well-developed.   HENT:      Head: Normocephalic.   Eyes:      Conjunctiva/sclera: Conjunctivae normal.   Neck:      Musculoskeletal: Normal range of  "motion.   Cardiovascular:      Rate and Rhythm: Normal rate and regular rhythm.      Heart sounds: Normal heart sounds. No murmur.   Pulmonary:      Effort: No respiratory distress.      Breath sounds: Normal breath sounds. No wheezing or rales.   Abdominal:      General: Bowel sounds are normal. There is no distension.      Palpations: Abdomen is soft.      Tenderness: There is no abdominal tenderness.   Musculoskeletal:       Normal range of motion.     Skin:General:        Skin is warm.   Neurological:         Mental Status: Patient is alert and oriented to person, place, and time.   Psychiatric:         Behavior: Behavior normal.     Vitals:/77   Pulse 82   Temp 97  F (36.1  C)   Resp 18   Ht 1.575 m (5' 2\")   Wt 48.1 kg (106 lb)   SpO2 96%   BMI 19.39 kg/m   and Body mass index is 19.39 kg/m .    Lab/Diagnostic data:   Recent Results (from the past 240 hour(s))   Asymptomatic COVID-19 Virus (Coronavirus) by PCR Nose    Collection Time: 07/04/22  7:50 PM    Specimen: Nose; Swab   Result Value Ref Range    SARS CoV2 PCR Negative Negative   Basic metabolic panel    Collection Time: 07/04/22  9:27 PM   Result Value Ref Range    Sodium 138 136 - 145 mmol/L    Potassium 3.9 3.5 - 5.0 mmol/L    Chloride 105 98 - 107 mmol/L    Carbon Dioxide (CO2) 24 22 - 31 mmol/L    Anion Gap 9 5 - 18 mmol/L    Urea Nitrogen 19 8 - 28 mg/dL    Creatinine 0.86 0.60 - 1.10 mg/dL    Calcium 9.1 8.5 - 10.5 mg/dL    Glucose 133 (H) 70 - 125 mg/dL    GFR Estimate 67 >60 mL/min/1.73m2   CBC with platelets and differential    Collection Time: 07/04/22  9:27 PM   Result Value Ref Range    WBC Count 9.5 4.0 - 11.0 10e3/uL    RBC Count 4.05 3.80 - 5.20 10e6/uL    Hemoglobin 12.2 11.7 - 15.7 g/dL    Hematocrit 36.7 35.0 - 47.0 %    MCV 91 78 - 100 fL    MCH 30.1 26.5 - 33.0 pg    MCHC 33.2 31.5 - 36.5 g/dL    RDW 13.2 10.0 - 15.0 %    Platelet Count 132 (L) 150 - 450 10e3/uL    % Neutrophils 84 %    % Lymphocytes 11 %    % Monocytes " 5 %    % Eosinophils 0 %    % Basophils 0 %    % Immature Granulocytes 0 %    NRBCs per 100 WBC 0 <1 /100    Absolute Neutrophils 7.9 1.6 - 8.3 10e3/uL    Absolute Lymphocytes 1.1 0.8 - 5.3 10e3/uL    Absolute Monocytes 0.5 0.0 - 1.3 10e3/uL    Absolute Eosinophils 0.0 0.0 - 0.7 10e3/uL    Absolute Basophils 0.0 0.0 - 0.2 10e3/uL    Absolute Immature Granulocytes 0.0 <=0.4 10e3/uL    Absolute NRBCs 0.0 10e3/uL   Potassium    Collection Time: 07/05/22  4:20 AM   Result Value Ref Range    Potassium 4.1 3.5 - 5.0 mmol/L   Magnesium    Collection Time: 07/05/22  4:20 AM   Result Value Ref Range    Magnesium 1.7 (L) 1.8 - 2.6 mg/dL   Magnesium    Collection Time: 07/06/22  6:36 AM   Result Value Ref Range    Magnesium 1.7 (L) 1.8 - 2.6 mg/dL   Hepatic panel    Collection Time: 07/06/22  6:36 AM   Result Value Ref Range    Bilirubin Total 0.7 0.0 - 1.0 mg/dL    Bilirubin Direct 0.2 <=0.5 mg/dL    Protein Total 6.3 6.0 - 8.0 g/dL    Albumin 3.1 (L) 3.5 - 5.0 g/dL    Alkaline Phosphatase 57 45 - 120 U/L    AST 93 (H) 0 - 40 U/L     (H) 0 - 45 U/L   CBC with platelets    Collection Time: 07/06/22  8:30 AM   Result Value Ref Range    WBC Count 6.5 4.0 - 11.0 10e3/uL    RBC Count 3.84 3.80 - 5.20 10e6/uL    Hemoglobin 11.5 (L) 11.7 - 15.7 g/dL    Hematocrit 34.9 (L) 35.0 - 47.0 %    MCV 91 78 - 100 fL    MCH 29.9 26.5 - 33.0 pg    MCHC 33.0 31.5 - 36.5 g/dL    RDW 13.6 10.0 - 15.0 %    Platelet Count 109 (L) 150 - 450 10e3/uL   Magnesium    Collection Time: 07/07/22  5:57 AM   Result Value Ref Range    Magnesium 1.7 (L) 1.8 - 2.6 mg/dL   CBC with platelets    Collection Time: 07/07/22  5:57 AM   Result Value Ref Range    WBC Count 6.3 4.0 - 11.0 10e3/uL    RBC Count 3.75 (L) 3.80 - 5.20 10e6/uL    Hemoglobin 11.1 (L) 11.7 - 15.7 g/dL    Hematocrit 34.0 (L) 35.0 - 47.0 %    MCV 91 78 - 100 fL    MCH 29.6 26.5 - 33.0 pg    MCHC 32.6 31.5 - 36.5 g/dL    RDW 13.5 10.0 - 15.0 %    Platelet Count 111 (L) 150 - 450 10e3/uL    Comprehensive metabolic panel    Collection Time: 07/07/22  5:57 AM   Result Value Ref Range    Sodium 136 136 - 145 mmol/L    Potassium 4.1 3.5 - 5.0 mmol/L    Chloride 106 98 - 107 mmol/L    Carbon Dioxide (CO2) 22 22 - 31 mmol/L    Anion Gap 8 5 - 18 mmol/L    Urea Nitrogen 13 8 - 28 mg/dL    Creatinine 0.69 0.60 - 1.10 mg/dL    Calcium 8.9 8.5 - 10.5 mg/dL    Glucose 94 70 - 125 mg/dL    Alkaline Phosphatase 64 45 - 120 U/L    AST 70 (H) 0 - 40 U/L     (H) 0 - 45 U/L    Protein Total 6.1 6.0 - 8.0 g/dL    Albumin 3.0 (L) 3.5 - 5.0 g/dL    Bilirubin Total 0.6 0.0 - 1.0 mg/dL    GFR Estimate 86 >60 mL/min/1.73m2   INR    Collection Time: 07/07/22  5:57 AM   Result Value Ref Range    INR 1.01 0.85 - 1.15       MEDICATIONS:     Review of your medicines          Accurate as of July 11, 2022  7:12 PM. If you have any questions, ask your nurse or doctor.            CONTINUE these medicines which have NOT CHANGED      Dose / Directions   acetaminophen 325 MG tablet  Commonly known as: TYLENOL  Used for: Multiple closed pelvic fractures with disruption of pelvic [place], initial encounter (H)      Dose: 325-650 mg  Take 1-2 tablets (325-650 mg) by mouth every 6 hours as needed for mild pain  Quantity: 60 tablet  Refills: 0     atorvastatin 20 MG tablet  Commonly known as: LIPITOR      Dose: 20 mg  Take 20 mg by mouth At Bedtime  Refills: 0     calcitonin (salmon) 200 UNIT/ACT nasal spray  Commonly known as: MIACALCIN  Used for: Multiple closed pelvic fractures with disruption of pelvic [place], initial encounter (H), Age related osteoporosis, unspecified pathological fracture presence      Dose: 1 spray  Spray 1 spray into one nostril alternating nostrils daily Alternate nostril each day.  Quantity: 3.7 mL  Refills: 0     calcium citrate 950 (200 Ca) MG tablet  Commonly known as: CITRACAL      Dose: 1 tablet  Take 1 tablet by mouth daily  Refills: 0     cyanocobalamin 100 MCG tablet  Commonly known as: VITAMIN  B-12      Dose: 100 mcg  Take 100 mcg by mouth daily  Refills: 0     enoxaparin ANTICOAGULANT 40 MG/0.4ML syringe  Commonly known as: LOVENOX  Used for: Multiple closed pelvic fractures with disruption of pelvic [place], initial encounter (H)      Dose: 40 mg  Inject 0.4 mLs (40 mg) Subcutaneous every 24 hours for 30 days  Quantity: 12 mL  Refills: 0     gabapentin 300 MG capsule  Commonly known as: NEURONTIN      Dose: 300 mg  Take 300 mg by mouth At Bedtime  Refills: 0     oxyCODONE 5 MG tablet  Commonly known as: ROXICODONE  Used for: Multiple closed pelvic fractures with disruption of pelvic [place], initial encounter (H)      Dose: 2.5 mg  Take 0.5 tablets (2.5 mg) by mouth every 6 hours as needed for pain  Quantity: 6 tablet  Refills: 0     spironolactone 25 MG tablet  Commonly known as: ALDACTONE      Dose: 25 mg  Take 25 mg by mouth daily  Refills: 0     vitamin D3 50 mcg (2000 units) tablet  Commonly known as: CHOLECALCIFEROL  Used for: Multiple closed pelvic fractures with disruption of pelvic [place], initial encounter (H), Age related osteoporosis, unspecified pathological fracture presence      Dose: 1 tablet  Take 1 tablet (50 mcg) by mouth daily  Quantity: 30 tablet  Refills: 0            ASSESSMENT/PLAN  Encounter Diagnoses   Name Primary?     Closed displaced fracture of shaft of right clavicle with routine healing, subsequent encounter Yes     Closed fracture of superior ramus of right pubis with routine healing, subsequent encounter      Pain management      Physical deconditioning      Right clavicle fracture follow-up with orthopedics, nonoperative, pain control    Right pubic Rami fracture follow-up with orthopedics, pain control    Pain management Tylenol every 6 hours as needed hyperlipidemia on atorvastatin 20 mg at bedtime, gabapentin 300 mg at bedtime, oxycodone 2.5 mg every 6 hours as needed    Edema spironolactone 25 mg 1 time a day    Anticoagulation management on enoxaparin 40 mg  subcutaneous daily x30 days which will end on 8/7/2022    Electronically signed by: Angella Ward CNP

## 2022-07-18 ENCOUNTER — TRANSITIONAL CARE UNIT VISIT (OUTPATIENT)
Dept: GERIATRICS | Facility: CLINIC | Age: 82
End: 2022-07-18
Payer: MEDICARE

## 2022-07-18 VITALS
BODY MASS INDEX: 18.95 KG/M2 | HEART RATE: 63 BPM | DIASTOLIC BLOOD PRESSURE: 66 MMHG | WEIGHT: 103.6 LBS | RESPIRATION RATE: 20 BRPM | SYSTOLIC BLOOD PRESSURE: 110 MMHG | TEMPERATURE: 96.8 F | OXYGEN SATURATION: 96 %

## 2022-07-18 DIAGNOSIS — S42.021D CLOSED DISPLACED FRACTURE OF SHAFT OF RIGHT CLAVICLE WITH ROUTINE HEALING, SUBSEQUENT ENCOUNTER: Primary | ICD-10-CM

## 2022-07-18 DIAGNOSIS — R53.81 PHYSICAL DECONDITIONING: ICD-10-CM

## 2022-07-18 DIAGNOSIS — R52 PAIN MANAGEMENT: ICD-10-CM

## 2022-07-18 PROCEDURE — 99308 SBSQ NF CARE LOW MDM 20: CPT | Performed by: NURSE PRACTITIONER

## 2022-07-18 NOTE — PROGRESS NOTES
[facility] GERIATRIC SERVICES  Chief Complaint   Patient presents with     [facility]     [place] Medical Record Number:  [id number]  Place of Service where encounter took place:  [facility] ([facility]) [[id number]]  Code Status: Full    HISTORY:      HPI:  Helena Brown  is 82 year old (1940) undergoing physical and occupational therapy. Excerpted from records She  was admitted on 7/4/2022 for right superior and inferior pelvic rami fractures, right sacral ala fracture, and mild displaced right midshaft clavicle fracture. She has a PMHx of PE in 2019 but is currently not on anticoagulation, as well as CAD s/p pacemaker for complete heart block in 2018, and osteoporosis. Labs on admission showed thrombocytopenia and hepatic panel showed elevated transaminases and decreased albumin. Transaminases and thrombocytopenia were followed and improved by the day of discharge. Orthopedics was consulted and they recommended non-operative management.  She was transferred to [facility] on 7/7, and will follow up with orthopedics in 3 weeks. She will also follow up with her primary care provider for initiation of bisphosphonate therapy for her osteoporosis in 2 weeks, as well as for further evaluation of her thrombocytopenia.    Today she was seen to review vital signs, labs, and a  routine visit.    She denied chest pain shortness of breath cough or congestion.  She denied constipation or diarrhea.  She rates her pain 5 out of 10 and does get relief with pain medications.  Her weights were reviewed and she is down 4.4 pounds.  Her labs were reviewed and her platelets are now normal at 265.  Magnesium is now 2.1 and BMP within normal limits.  She denies any numbness or tingling,right fingers warm to touch.    ALLERGIES:Nsaids and Sulfa drugs    PAST MEDICAL HISTORY:   Past Medical History:   Diagnosis Date     Anxiety      Arthritis      Asthma      Colitis     Collagenous     Complete heart block (H)       Hypertriglyceridemia      Macular degeneration      Osteopenia      Osteoporosis      Pacemaker      Pulmonary embolism (H)      Restless legs      Skin cancer, basal cell        PAST SURGICAL HISTORY:   has a past surgical history that includes IR Extremity Venogram Left (7/25/2019); Breast Excisional Biopsy (Right, 6125-2627); Breast surgery; Bunionectomy; Arthroscopy shoulder rotator cuff repair; appendectomy; TOTAL KNEE ARTHROPLASTY (Left, 8/30/2018); Tonsillectomy; TOTAL KNEE ARTHROPLASTY (Right, 11/8/2018); Ep Pacemaker Insert (N/A, 3/29/2019); and Ir Extremity Venogram Left (7/25/2019).    FAMILY HISTORY: family history includes Alzheimer Disease in her father; Breast Cancer in her cousin; Heart Disease in her mother.    SOCIAL HISTORY:  reports that she quit smoking about 61 years ago. She has a 1.00 pack-year smoking history. She has never used smokeless tobacco. She reports current alcohol use of about 7.0 standard drinks of alcohol per week. She reports that she does not use drugs.    ROS:  Constitutional: Negative for activity change, appetite change, fatigue and fever.   HENT: Negative for congestion.    Respiratory: Negative for cough, shortness of breath and wheezing.    Cardiovascular: Negative for chest pain and leg swelling.   Gastrointestinal: Negative for abdominal distention, abdominal pain, constipation, diarrhea and nausea.   Genitourinary: Negative for dysuria.   Musculoskeletal: Negative for arthralgia. Negative for back pain.  Right pubic Ramifracture, right clavicle fracture nonoperative  Skin: Negative for color change and wound.   Neurological: Negative for dizziness.   Psychiatric/Behavioral: Negative for agitation, behavioral problems and confusion.     Physical Exam:  Constitutional:       Appearance: Patient is well-developed.   HENT:      Head: Normocephalic.   Eyes:      Conjunctiva/sclera: Conjunctivae normal.   Neck:      Musculoskeletal: Normal range of motion.   Cardiovascular:       Rate and Rhythm: Normal rate and regular rhythm.      Heart sounds: Normal heart sounds. No murmur.   Pulmonary:      Effort: No respiratory distress.      Breath sounds: Normal breath sounds. No wheezing or rales.   Abdominal:      General: Bowel sounds are normal. There is no distension.      Palpations: Abdomen is soft.      Tenderness: There is no abdominal tenderness.   Musculoskeletal:       Normal range of motion.     Skin:General:        Skin is warm.   Neurological:         Mental Status: Patient is alert and oriented to person, place, and time.   Psychiatric:         Behavior: Behavior normal.     Vitals:There were no vitals taken for this visit. and There is no height or weight on file to calculate BMI.    Lab/Diagnostic data:   Recent Results (from the past 240 hour(s))   CBC with platelets    Collection Time: 07/12/22  8:15 AM   Result Value Ref Range    WBC Count 6.4 4.0 - 11.0 10e3/uL    RBC Count 3.76 (L) 3.80 - 5.20 10e6/uL    Hemoglobin 11.4 (L) 11.7 - 15.7 g/dL    Hematocrit 35.4 35.0 - 47.0 %    MCV 94 78 - 100 fL    MCH 30.3 26.5 - 33.0 pg    MCHC 32.2 31.5 - 36.5 g/dL    RDW 14.3 10.0 - 15.0 %    Platelet Count 265 150 - 450 10e3/uL   Magnesium    Collection Time: 07/12/22  8:15 AM   Result Value Ref Range    Magnesium 2.1 1.7 - 2.3 mg/dL   Basic metabolic panel    Collection Time: 07/12/22  8:15 AM   Result Value Ref Range    Creatinine 0.63 0.51 - 0.95 mg/dL    Sodium 137 136 - 145 mmol/L    Potassium 4.4 3.4 - 5.3 mmol/L    Urea Nitrogen 22.7 8.0 - 23.0 mg/dL    Chloride 101 98 - 107 mmol/L    Carbon Dioxide (CO2) 23 22 - 29 mmol/L    Anion Gap 13 7 - 15 mmol/L    Glucose 78 70 - 99 mg/dL    GFR Estimate 88 >60 mL/min/1.73m2    Calcium 9.3 8.8 - 10.2 mg/dL       MEDICATIONS:     Review of your medicines          Accurate as of July 18, 2022  9:30 AM. If you have any questions, ask your nurse or doctor.            CONTINUE these medicines which have NOT CHANGED      Dose / Directions    acetaminophen 325 MG tablet  Commonly known as: TYLENOL  Used for: Multiple closed pelvic fractures with disruption of pelvic [place], initial encounter (H)      Dose: 325-650 mg  Take 1-2 tablets (325-650 mg) by mouth every 6 hours as needed for mild pain  Quantity: 60 tablet  Refills: 0     atorvastatin 20 MG tablet  Commonly known as: LIPITOR      Dose: 20 mg  Take 20 mg by mouth At Bedtime  Refills: 0     calcitonin (salmon) 200 UNIT/ACT nasal spray  Commonly known as: MIACALCIN  Used for: Multiple closed pelvic fractures with disruption of pelvic [place], initial encounter (H), Age related osteoporosis, unspecified pathological fracture presence      Dose: 1 spray  Spray 1 spray into one nostril alternating nostrils daily Alternate nostril each day.  Quantity: 3.7 mL  Refills: 0     calcium citrate 950 (200 Ca) MG tablet  Commonly known as: CITRACAL      Dose: 1 tablet  Take 1 tablet by mouth daily  Refills: 0     cyanocobalamin 100 MCG tablet  Commonly known as: VITAMIN B-12      Dose: 100 mcg  Take 100 mcg by mouth daily  Refills: 0     enoxaparin ANTICOAGULANT 40 MG/0.4ML syringe  Commonly known as: LOVENOX  Used for: Multiple closed pelvic fractures with disruption of pelvic [place], initial encounter (H)      Dose: 40 mg  Inject 0.4 mLs (40 mg) Subcutaneous every 24 hours for 30 days  Quantity: 12 mL  Refills: 0     gabapentin 300 MG capsule  Commonly known as: NEURONTIN      Dose: 300 mg  Take 300 mg by mouth At Bedtime  Refills: 0     melatonin 5 MG Caps      Dose: 5 mg  Take 5 mg by mouth every evening as needed  Refills: 0     oxyCODONE 5 MG tablet  Commonly known as: ROXICODONE  Used for: Multiple closed pelvic fractures with disruption of pelvic [place], initial encounter (H)      Dose: 2.5 mg  Take 0.5 tablets (2.5 mg) by mouth every 6 hours as needed for pain  Quantity: 6 tablet  Refills: 0     spironolactone 25 MG tablet  Commonly known as: ALDACTONE      Dose: 25 mg  Take 25 mg by mouth  daily  Refills: 0     vitamin D3 50 mcg (2000 units) tablet  Commonly known as: CHOLECALCIFEROL  Used for: Multiple closed pelvic fractures with disruption of pelvic [place], initial encounter (H), Age related osteoporosis, unspecified pathological fracture presence      Dose: 1 tablet  Take 1 tablet (50 mcg) by mouth daily  Quantity: 30 tablet  Refills: 0            ASSESSMENT/PLAN  Encounter Diagnoses   Name Primary?     Closed displaced fracture of shaft of right clavicle with routine healing, subsequent encounter Yes     Physical deconditioning      Pain management      Right clavicle fracture follow-up with orthopedics, nonoperative, pain control    Right pubic Rami fracture follow-up with orthopedics, pain control    Pain management Tylenol every 6 hours as needed  gabapentin 300 mg at bedtime, oxycodone 2.5 mg every 6 hours as needed     hyperlipidemia on atorvastatin 20 mg at bedtime,    Edema spironolactone 25 mg 1 time a day    Anticoagulation management on enoxaparin 40 mg subcutaneous daily x30 days which will end on 8/7/2022    Thrombocytopenia platelets now normal at 265    Electronically signed by: Angella Ward CNP

## 2022-07-18 NOTE — LETTER
7/18/2022        RE: Helena Brown  [address] [facility] [address]        [facility] GERIATRIC SERVICES  Chief Complaint   Patient presents with     [facility] Medical Record Number:  [id number]  Place of Service where encounter took place:  [facility] ([facility]) [[id number]]  Code Status: Full    HISTORY:      HPI:  Helena Brown  is 82 year old (1940) undergoing physical and occupational therapy. Excerpted from records She  was admitted on 7/4/2022 for right superior and inferior pelvic rami fractures, right sacral ala fracture, and mild displaced right midshaft clavicle fracture. She has a PMHx of PE in 2019 but is currently not on anticoagulation, as well as CAD s/p pacemaker for complete heart block in 2018, and osteoporosis. Labs on admission showed thrombocytopenia and hepatic panel showed elevated transaminases and decreased albumin. Transaminases and thrombocytopenia were followed and improved by the day of discharge. Orthopedics was consulted and they recommended non-operative management.  She was transferred to [facility] on 7/7, and will follow up with orthopedics in 3 weeks. She will also follow up with her primary care provider for initiation of bisphosphonate therapy for her osteoporosis in 2 weeks, as well as for further evaluation of her thrombocytopenia.    Today she was seen to review vital signs, labs, and a  routine visit.    She denied chest pain shortness of breath cough or congestion.  She denied constipation or diarrhea.  She rates her pain 5 out of 10 and does get relief with pain medications.  Her weights were reviewed and she is down 4.4 pounds.  Her labs were reviewed and her platelets are now normal at 265.  Magnesium is now 2.1 and BMP within normal limits.  She denies any numbness or tingling,right fingers warm to touch.    ALLERGIES:Nsaids and Sulfa drugs    PAST MEDICAL HISTORY:   Past Medical History:   Diagnosis Date     Anxiety       Arthritis      Asthma      Colitis     Collagenous     Complete heart block (H)      Hypertriglyceridemia      Macular degeneration      Osteopenia      Osteoporosis      Pacemaker      Pulmonary embolism (H)      Restless legs      Skin cancer, basal cell        PAST SURGICAL HISTORY:   has a past surgical history that includes IR Extremity Venogram Left (7/25/2019); Breast Excisional Biopsy (Right, 9477-9699); Breast surgery; Bunionectomy; Arthroscopy shoulder rotator cuff repair; appendectomy; TOTAL KNEE ARTHROPLASTY (Left, 8/30/2018); Tonsillectomy; TOTAL KNEE ARTHROPLASTY (Right, 11/8/2018); Ep Pacemaker Insert (N/A, 3/29/2019); and Ir Extremity Venogram Left (7/25/2019).    FAMILY HISTORY: family history includes Alzheimer Disease in her father; Breast Cancer in her cousin; Heart Disease in her mother.    SOCIAL HISTORY:  reports that she quit smoking about 61 years ago. She has a 1.00 pack-year smoking history. She has never used smokeless tobacco. She reports current alcohol use of about 7.0 standard drinks of alcohol per week. She reports that she does not use drugs.    ROS:  Constitutional: Negative for activity change, appetite change, fatigue and fever.   HENT: Negative for congestion.    Respiratory: Negative for cough, shortness of breath and wheezing.    Cardiovascular: Negative for chest pain and leg swelling.   Gastrointestinal: Negative for abdominal distention, abdominal pain, constipation, diarrhea and nausea.   Genitourinary: Negative for dysuria.   Musculoskeletal: Negative for arthralgia. Negative for back pain.  Right pubic Ramifracture, right clavicle fracture nonoperative  Skin: Negative for color change and wound.   Neurological: Negative for dizziness.   Psychiatric/Behavioral: Negative for agitation, behavioral problems and confusion.     Physical Exam:  Constitutional:       Appearance: Patient is well-developed.   HENT:      Head: Normocephalic.   Eyes:      Conjunctiva/sclera:  Conjunctivae normal.   Neck:      Musculoskeletal: Normal range of motion.   Cardiovascular:      Rate and Rhythm: Normal rate and regular rhythm.      Heart sounds: Normal heart sounds. No murmur.   Pulmonary:      Effort: No respiratory distress.      Breath sounds: Normal breath sounds. No wheezing or rales.   Abdominal:      General: Bowel sounds are normal. There is no distension.      Palpations: Abdomen is soft.      Tenderness: There is no abdominal tenderness.   Musculoskeletal:       Normal range of motion.     Skin:General:        Skin is warm.   Neurological:         Mental Status: Patient is alert and oriented to person, place, and time.   Psychiatric:         Behavior: Behavior normal.     Vitals:There were no vitals taken for this visit. and There is no height or weight on file to calculate BMI.    Lab/Diagnostic data:   Recent Results (from the past 240 hour(s))   CBC with platelets    Collection Time: 07/12/22  8:15 AM   Result Value Ref Range    WBC Count 6.4 4.0 - 11.0 10e3/uL    RBC Count 3.76 (L) 3.80 - 5.20 10e6/uL    Hemoglobin 11.4 (L) 11.7 - 15.7 g/dL    Hematocrit 35.4 35.0 - 47.0 %    MCV 94 78 - 100 fL    MCH 30.3 26.5 - 33.0 pg    MCHC 32.2 31.5 - 36.5 g/dL    RDW 14.3 10.0 - 15.0 %    Platelet Count 265 150 - 450 10e3/uL   Magnesium    Collection Time: 07/12/22  8:15 AM   Result Value Ref Range    Magnesium 2.1 1.7 - 2.3 mg/dL   Basic metabolic panel    Collection Time: 07/12/22  8:15 AM   Result Value Ref Range    Creatinine 0.63 0.51 - 0.95 mg/dL    Sodium 137 136 - 145 mmol/L    Potassium 4.4 3.4 - 5.3 mmol/L    Urea Nitrogen 22.7 8.0 - 23.0 mg/dL    Chloride 101 98 - 107 mmol/L    Carbon Dioxide (CO2) 23 22 - 29 mmol/L    Anion Gap 13 7 - 15 mmol/L    Glucose 78 70 - 99 mg/dL    GFR Estimate 88 >60 mL/min/1.73m2    Calcium 9.3 8.8 - 10.2 mg/dL       MEDICATIONS:     Review of your medicines          Accurate as of July 18, 2022  9:30 AM. If you have any questions, ask your nurse or  doctor.            CONTINUE these medicines which have NOT CHANGED      Dose / Directions   acetaminophen 325 MG tablet  Commonly known as: TYLENOL  Used for: Multiple closed pelvic fractures with disruption of pelvic [place] initial encounter (H)      Dose: 325-650 mg  Take 1-2 tablets (325-650 mg) by mouth every 6 hours as needed for mild pain  Quantity: 60 tablet  Refills: 0     atorvastatin 20 MG tablet  Commonly known as: LIPITOR      Dose: 20 mg  Take 20 mg by mouth At Bedtime  Refills: 0     calcitonin (salmon) 200 UNIT/ACT nasal spray  Commonly known as: MIACALCIN  Used for: Multiple closed pelvic fractures with disruption of pelvic [place] initial encounter (H), Age related osteoporosis, unspecified pathological fracture presence      Dose: 1 spray  Spray 1 spray into one nostril alternating nostrils daily Alternate nostril each day.  Quantity: 3.7 mL  Refills: 0     calcium citrate 950 (200 Ca) MG tablet  Commonly known as: CITRACAL      Dose: 1 tablet  Take 1 tablet by mouth daily  Refills: 0     cyanocobalamin 100 MCG tablet  Commonly known as: VITAMIN B-12      Dose: 100 mcg  Take 100 mcg by mouth daily  Refills: 0     enoxaparin ANTICOAGULANT 40 MG/0.4ML syringe  Commonly known as: LOVENOX  Used for: Multiple closed pelvic fractures with disruption of pelvic [place] initial encounter (H)      Dose: 40 mg  Inject 0.4 mLs (40 mg) Subcutaneous every 24 hours for 30 days  Quantity: 12 mL  Refills: 0     gabapentin 300 MG capsule  Commonly known as: NEURONTIN      Dose: 300 mg  Take 300 mg by mouth At Bedtime  Refills: 0     melatonin 5 MG Caps      Dose: 5 mg  Take 5 mg by mouth every evening as needed  Refills: 0     oxyCODONE 5 MG tablet  Commonly known as: ROXICODONE  Used for: Multiple closed pelvic fractures with disruption of pelvic [place] initial encounter (H)      Dose: 2.5 mg  Take 0.5 tablets (2.5 mg) by mouth every 6 hours as needed for pain  Quantity: 6 tablet  Refills: 0     spironolactone 25  MG tablet  Commonly known as: ALDACTONE      Dose: 25 mg  Take 25 mg by mouth daily  Refills: 0     vitamin D3 50 mcg (2000 units) tablet  Commonly known as: CHOLECALCIFEROL  Used for: Multiple closed pelvic fractures with disruption of pelvic [place], initial encounter (H), Age related osteoporosis, unspecified pathological fracture presence      Dose: 1 tablet  Take 1 tablet (50 mcg) by mouth daily  Quantity: 30 tablet  Refills: 0            ASSESSMENT/PLAN  Encounter Diagnoses   Name Primary?     Closed displaced fracture of shaft of right clavicle with routine healing, subsequent encounter Yes     Physical deconditioning      Pain management      Right clavicle fracture follow-up with orthopedics, nonoperative, pain control    Right pubic Rami fracture follow-up with orthopedics, pain control    Pain management Tylenol every 6 hours as needed  gabapentin 300 mg at bedtime, oxycodone 2.5 mg every 6 hours as needed     hyperlipidemia on atorvastatin 20 mg at bedtime,    Edema spironolactone 25 mg 1 time a day    Anticoagulation management on enoxaparin 40 mg subcutaneous daily x30 days which will end on 8/7/2022    Thrombocytopenia platelets now normal at 265    Electronically signed by: Angella Ward CNP        Sincerely,        Angella Ward CNP

## 2022-07-25 ENCOUNTER — TRANSITIONAL CARE UNIT VISIT (OUTPATIENT)
Dept: GERIATRICS | Facility: CLINIC | Age: 82
End: 2022-07-25
Payer: MEDICARE

## 2022-07-25 VITALS
SYSTOLIC BLOOD PRESSURE: 100 MMHG | BODY MASS INDEX: 19.14 KG/M2 | RESPIRATION RATE: 18 BRPM | DIASTOLIC BLOOD PRESSURE: 56 MMHG | WEIGHT: 104 LBS | OXYGEN SATURATION: 92 % | HEART RATE: 62 BPM | TEMPERATURE: 96.4 F | HEIGHT: 62 IN

## 2022-07-25 DIAGNOSIS — R52 PAIN MANAGEMENT: ICD-10-CM

## 2022-07-25 DIAGNOSIS — R53.81 PHYSICAL DECONDITIONING: ICD-10-CM

## 2022-07-25 DIAGNOSIS — S42.021D CLOSED DISPLACED FRACTURE OF SHAFT OF RIGHT CLAVICLE WITH ROUTINE HEALING, SUBSEQUENT ENCOUNTER: Primary | ICD-10-CM

## 2022-07-25 PROCEDURE — 99315 NF DSCHRG MGMT 30 MIN/LESS: CPT | Performed by: NURSE PRACTITIONER

## 2022-07-25 NOTE — PROGRESS NOTES
[facility] GERIATRIC SERVICES  Chief Complaint   Patient presents with     NANCYCARLIE     [place] Medical Record Number:  [id number]  Place of Service where encounter took place:  [facility] ([facility]) [[id number]]  Code Status: Full    HISTORY:      HPI:  Helena Brown  is 82 year old (1940) undergoing physical and occupational therapy. Excerpted from records She  was admitted on 7/4/2022 for right superior and inferior pelvic rami fractures, right sacral ala fracture, and mild displaced right midshaft clavicle fracture. She has a PMHx of PE in 2019 but is currently not on anticoagulation, as well as CAD s/p pacemaker for complete heart block in 2018, and osteoporosis. Labs on admission showed thrombocytopenia and hepatic panel showed elevated transaminases and decreased albumin. Transaminases and thrombocytopenia were followed and improved by the day of discharge. Orthopedics was consulted and they recommended non-operative management.  She was transferred to [facility] on 7/7, and will follow up with orthopedics in 3 weeks. She will also follow up with her primary care provider for initiation of bisphosphonate therapy for her osteoporosis in 2 weeks, as well as for further evaluation of her thrombocytopenia.    Today she was seen to review vital signs, labs,  a  routine visit   And a face-to-face for discharge.  She will discharge to home on 7/28/2022 with current medications and treatments.  She will have home care services PT OT home health aide.    She denied chest pain shortness of breath cough or congestion.  She denied constipation or diarrhea.  She rates her pain 4 out of 10 and does get relief with pain medications.  Her weights are now stable her labs were reviewed and her platelets are now normal at 265.  Magnesium is now 2.1 and BMP within normal limits.  She denies any numbness or tingling,right fingers warm to touch.  Last hemoglobin 11.4    ALLERGIES:Nsaids and Sulfa  drugs    PAST MEDICAL HISTORY:   Past Medical History:   Diagnosis Date     Anxiety      Arthritis      Asthma      Colitis     Collagenous     Complete heart block (H)      Hypertriglyceridemia      Macular degeneration      Osteopenia      Osteoporosis      Pacemaker      Pulmonary embolism (H)      Restless legs      Skin cancer, basal cell        PAST SURGICAL HISTORY:   has a past surgical history that includes IR Extremity Venogram Left (7/25/2019); Breast Excisional Biopsy (Right, 9963-9541); Breast surgery; Bunionectomy; Arthroscopy shoulder rotator cuff repair; appendectomy; TOTAL KNEE ARTHROPLASTY (Left, 8/30/2018); Tonsillectomy; TOTAL KNEE ARTHROPLASTY (Right, 11/8/2018); Ep Pacemaker Insert (N/A, 3/29/2019); and Ir Extremity Venogram Left (7/25/2019).    FAMILY HISTORY: family history includes Alzheimer Disease in her father; Breast Cancer in her cousin; Heart Disease in her mother.    SOCIAL HISTORY:  reports that she quit smoking about 61 years ago. She has a 1.00 pack-year smoking history. She has never used smokeless tobacco. She reports current alcohol use of about 7.0 standard drinks of alcohol per week. She reports that she does not use drugs.    ROS:  Constitutional: Negative for activity change, appetite change, fatigue and fever.   HENT: Negative for congestion.    Respiratory: Negative for cough, shortness of breath and wheezing.    Cardiovascular: Negative for chest pain and leg swelling.   Gastrointestinal: Negative for abdominal distention, abdominal pain, constipation, diarrhea and nausea.   Genitourinary: Negative for dysuria.   Musculoskeletal: Negative for arthralgia. Negative for back pain.  Right pubic Ramifracture, right clavicle fracture nonoperative  Skin: Negative for color change and wound.   Neurological: Negative for dizziness.   Psychiatric/Behavioral: Negative for agitation, behavioral problems and confusion.     Physical Exam:  Constitutional:       Appearance: Patient is  "well-developed.   HENT:      Head: Normocephalic.   Eyes:      Conjunctiva/sclera: Conjunctivae normal.   Neck:      Musculoskeletal: Normal range of motion.   Cardiovascular:      Rate and Rhythm: Normal rate and regular rhythm.      Heart sounds: Normal heart sounds. No murmur.   Pulmonary:      Effort: No respiratory distress.      Breath sounds: Normal breath sounds. No wheezing or rales.   Abdominal:      General: Bowel sounds are normal. There is no distension.      Palpations: Abdomen is soft.      Tenderness: There is no abdominal tenderness.   Musculoskeletal:       Normal range of motion.     Skin:General:        Skin is warm.   Neurological:         Mental Status: Patient is alert and oriented to person, place, and time.   Psychiatric:         Behavior: Behavior normal.     Vitals:/56   Pulse 62   Temp (!) 96.4  F (35.8  C)   Resp 18   Ht 1.575 m (5' 2\")   Wt 47.2 kg (104 lb)   SpO2 92%   BMI 19.02 kg/m   and Body mass index is 19.02 kg/m .    Lab/Diagnostic data:   No results found for this or any previous visit (from the past 240 hour(s)).    MEDICATIONS:     Review of your medicines          Accurate as of July 25, 2022 10:47 AM. If you have any questions, ask your nurse or doctor.            CONTINUE these medicines which have NOT CHANGED      Dose / Directions   acetaminophen 325 MG tablet  Commonly known as: TYLENOL  Used for: Multiple closed pelvic fractures with disruption of pelvic [place], initial encounter (H)      Dose: 325-650 mg  Take 1-2 tablets (325-650 mg) by mouth every 6 hours as needed for mild pain  Quantity: 60 tablet  Refills: 0     atorvastatin 20 MG tablet  Commonly known as: LIPITOR      Dose: 20 mg  Take 20 mg by mouth At Bedtime  Refills: 0     calcitonin (salmon) 200 UNIT/ACT nasal spray  Commonly known as: MIACALCIN  Used for: Multiple closed pelvic fractures with disruption of pelvic [place], initial encounter (H), Age related osteoporosis, unspecified " pathological fracture presence      Dose: 1 spray  Spray 1 spray into one nostril alternating nostrils daily Alternate nostril each day.  Quantity: 3.7 mL  Refills: 0     calcium citrate 950 (200 Ca) MG tablet  Commonly known as: CITRACAL      Dose: 1 tablet  Take 1 tablet by mouth daily  Refills: 0     cyanocobalamin 100 MCG tablet  Commonly known as: VITAMIN B-12      Dose: 100 mcg  Take 100 mcg by mouth daily  Refills: 0     enoxaparin ANTICOAGULANT 40 MG/0.4ML syringe  Commonly known as: LOVENOX  Used for: Multiple closed pelvic fractures with disruption of pelvic [place], initial encounter (H)      Dose: 40 mg  Inject 0.4 mLs (40 mg) Subcutaneous every 24 hours for 30 days  Quantity: 12 mL  Refills: 0     gabapentin 300 MG capsule  Commonly known as: NEURONTIN      Dose: 300 mg  Take 300 mg by mouth At Bedtime  Refills: 0     melatonin 5 MG Caps      Dose: 5 mg  Take 5 mg by mouth every evening as needed  Refills: 0     oxyCODONE 5 MG tablet  Commonly known as: ROXICODONE  Used for: Multiple closed pelvic fractures with disruption of pelvic [place], initial encounter (H)      Dose: 2.5 mg  Take 0.5 tablets (2.5 mg) by mouth every 6 hours as needed for pain  Quantity: 6 tablet  Refills: 0     spironolactone 25 MG tablet  Commonly known as: ALDACTONE      Dose: 25 mg  Take 25 mg by mouth daily  Refills: 0     vitamin D3 50 mcg (2000 units) tablet  Commonly known as: CHOLECALCIFEROL  Used for: Multiple closed pelvic fractures with disruption of pelvic [place], initial encounter (H), Age related osteoporosis, unspecified pathological fracture presence      Dose: 1 tablet  Take 1 tablet (50 mcg) by mouth daily  Quantity: 30 tablet  Refills: 0            ASSESSMENT/PLAN  Encounter Diagnoses   Name Primary?     Closed displaced fracture of shaft of right clavicle with routine healing, subsequent encounter Yes     Physical deconditioning      Pain management      Right clavicle fracture follow-up with orthopedics,  nonoperative, pain control    Right pubic Rami fracture follow-up with orthopedics, pain control    Pain management Tylenol every 6 hours as needed  gabapentin 300 mg at bedtime, oxycodone 2.5 mg every 6 hours as needed     hyperlipidemia on atorvastatin 20 mg at bedtime,    Edema spironolactone 25 mg one time a day    Anticoagulation management on enoxaparin 40 mg subcutaneous daily x30 days which will end on 8/7/2022    Thrombocytopenia platelets now normal at 265      DISCHARGE PLAN/FACE TO FACE:  I certify that services are/were furnished while this patient was under the care of a physician and that a physician or an allowed non-physician practitioner (NPP), had a face-to-face encounter that meets the physician face-to-face encounter requirements. The encounter was in whole, or in part, related to the primary reason for home health. The patient is confined to his/her home and needs intermittent skilled nursing, physical therapy, speech-language pathology, or the continued need for occupational therapy. A plan of care has been established by a physician and is periodically reviewed by a physician.  Date of Face-to-Face Encounter: 7/25/2022    I certify that, based on my findings, the following services are medically necessary home health services: PT OT home health aide    My clinical findings support the need for the above skilled services because: PT OT for continued strength and endurance following recent right pelvic fracture and right clavicle fracture    This patient is homebound because: She is deconditioned following recent pelvic and clavicle fracture on the right side    The patient is, or has been, under my care and I have initiated the establishment of the plan of care. This patient will be followed by a physician who will periodically review the plan of care.    Schedule follow up visit with primary care provider within 7 days to reestablish care.    Electronically signed by: Angella Ward CNP

## 2022-07-25 NOTE — LETTER
7/25/2022        RE: Helena Brown  [address] [facility] [address]        [facility]  Chief Complaint   Patient presents with     [facility] Medical Record Number:  [id number]  Place of Service where encounter took place:  [facility] ([facility]) [[address]51]  Code Status: Full    HISTORY:      HPI:  Helena Brown  is 82 year old (1940) undergoing physical and occupational therapy. Excerpted from records She  was admitted on 7/4/2022 for right superior and inferior pelvic rami fractures, right sacral ala fracture, and mild displaced right midshaft clavicle fracture. She has a PMHx of PE in 2019 but is currently not on anticoagulation, as well as CAD s/p pacemaker for complete heart block in 2018, and osteoporosis. Labs on admission showed thrombocytopenia and hepatic panel showed elevated transaminases and decreased albumin. Transaminases and thrombocytopenia were followed and improved by the day of discharge. Orthopedics was consulted and they recommended non-operative management.  She was transferred to [facility] on 7/7, and will follow up with orthopedics in 3 weeks. She will also follow up with her primary care provider for initiation of bisphosphonate therapy for her osteoporosis in 2 weeks, as well as for further evaluation of her thrombocytopenia.    Today she was seen to review vital signs, labs,  a  routine visit   And a face-to-face for discharge.  She will discharge to home on 7/28/2022 with current medications and treatments.  She will have home care services PT OT home health aide.    She denied chest pain shortness of breath cough or congestion.  She denied constipation or diarrhea.  She rates her pain 4 out of 10 and does get relief with pain medications.  Her weights are now stable her labs were reviewed and her platelets are now normal at 265.  Magnesium is now 2.1 and BMP within normal limits.  She denies any numbness or tingling,right  fingers warm to touch.  Last hemoglobin 11.4    ALLERGIES:Nsaids and Sulfa drugs    PAST MEDICAL HISTORY:   Past Medical History:   Diagnosis Date     Anxiety      Arthritis      Asthma      Colitis     Collagenous     Complete heart block (H)      Hypertriglyceridemia      Macular degeneration      Osteopenia      Osteoporosis      Pacemaker      Pulmonary embolism (H)      Restless legs      Skin cancer, basal cell        PAST SURGICAL HISTORY:   has a past surgical history that includes IR Extremity Venogram Left (7/25/2019); Breast Excisional Biopsy (Right, 6599-3345); Breast surgery; Bunionectomy; Arthroscopy shoulder rotator cuff repair; appendectomy; TOTAL KNEE ARTHROPLASTY (Left, 8/30/2018); Tonsillectomy; TOTAL KNEE ARTHROPLASTY (Right, 11/8/2018); Ep Pacemaker Insert (N/A, 3/29/2019); and Ir Extremity Venogram Left (7/25/2019).    FAMILY HISTORY: family history includes Alzheimer Disease in her father; Breast Cancer in her cousin; Heart Disease in her mother.    SOCIAL HISTORY:  reports that she quit smoking about 61 years ago. She has a 1.00 pack-year smoking history. She has never used smokeless tobacco. She reports current alcohol use of about 7.0 standard drinks of alcohol per week. She reports that she does not use drugs.    ROS:  Constitutional: Negative for activity change, appetite change, fatigue and fever.   HENT: Negative for congestion.    Respiratory: Negative for cough, shortness of breath and wheezing.    Cardiovascular: Negative for chest pain and leg swelling.   Gastrointestinal: Negative for abdominal distention, abdominal pain, constipation, diarrhea and nausea.   Genitourinary: Negative for dysuria.   Musculoskeletal: Negative for arthralgia. Negative for back pain.  Right pubic Ramifracture, right clavicle fracture nonoperative  Skin: Negative for color change and wound.   Neurological: Negative for dizziness.   Psychiatric/Behavioral: Negative for agitation, behavioral problems and  "confusion.     Physical Exam:  Constitutional:       Appearance: Patient is well-developed.   HENT:      Head: Normocephalic.   Eyes:      Conjunctiva/sclera: Conjunctivae normal.   Neck:      Musculoskeletal: Normal range of motion.   Cardiovascular:      Rate and Rhythm: Normal rate and regular rhythm.      Heart sounds: Normal heart sounds. No murmur.   Pulmonary:      Effort: No respiratory distress.      Breath sounds: Normal breath sounds. No wheezing or rales.   Abdominal:      General: Bowel sounds are normal. There is no distension.      Palpations: Abdomen is soft.      Tenderness: There is no abdominal tenderness.   Musculoskeletal:       Normal range of motion.     Skin:General:        Skin is warm.   Neurological:         Mental Status: Patient is alert and oriented to person, place, and time.   Psychiatric:         Behavior: Behavior normal.     Vitals:/56   Pulse 62   Temp (!) 96.4  F (35.8  C)   Resp 18   Ht 1.575 m (5' 2\")   Wt 47.2 kg (104 lb)   SpO2 92%   BMI 19.02 kg/m   and Body mass index is 19.02 kg/m .    Lab/Diagnostic data:   No results found for this or any previous visit (from the past 240 hour(s)).    MEDICATIONS:     Review of your medicines          Accurate as of July 25, 2022 10:47 AM. If you have any questions, ask your nurse or doctor.            CONTINUE these medicines which have NOT CHANGED      Dose / Directions   acetaminophen 325 MG tablet  Commonly known as: TYLENOL  Used for: Multiple closed pelvic fractures with disruption of pelvic [place], initial encounter (H)      Dose: 325-650 mg  Take 1-2 tablets (325-650 mg) by mouth every 6 hours as needed for mild pain  Quantity: 60 tablet  Refills: 0     atorvastatin 20 MG tablet  Commonly known as: LIPITOR      Dose: 20 mg  Take 20 mg by mouth At Bedtime  Refills: 0     calcitonin (salmon) 200 UNIT/ACT nasal spray  Commonly known as: MIACALCIN  Used for: Multiple closed pelvic fractures with disruption of pelvic " [place], initial encounter (H), Age related osteoporosis, unspecified pathological fracture presence      Dose: 1 spray  Spray 1 spray into one nostril alternating nostrils daily Alternate nostril each day.  Quantity: 3.7 mL  Refills: 0     calcium citrate 950 (200 Ca) MG tablet  Commonly known as: CITRACAL      Dose: 1 tablet  Take 1 tablet by mouth daily  Refills: 0     cyanocobalamin 100 MCG tablet  Commonly known as: VITAMIN B-12      Dose: 100 mcg  Take 100 mcg by mouth daily  Refills: 0     enoxaparin ANTICOAGULANT 40 MG/0.4ML syringe  Commonly known as: LOVENOX  Used for: Multiple closed pelvic fractures with disruption of pelvic [place], initial encounter (H)      Dose: 40 mg  Inject 0.4 mLs (40 mg) Subcutaneous every 24 hours for 30 days  Quantity: 12 mL  Refills: 0     gabapentin 300 MG capsule  Commonly known as: NEURONTIN      Dose: 300 mg  Take 300 mg by mouth At Bedtime  Refills: 0     melatonin 5 MG Caps      Dose: 5 mg  Take 5 mg by mouth every evening as needed  Refills: 0     oxyCODONE 5 MG tablet  Commonly known as: ROXICODONE  Used for: Multiple closed pelvic fractures with disruption of pelvic [place], initial encounter (H)      Dose: 2.5 mg  Take 0.5 tablets (2.5 mg) by mouth every 6 hours as needed for pain  Quantity: 6 tablet  Refills: 0     spironolactone 25 MG tablet  Commonly known as: ALDACTONE      Dose: 25 mg  Take 25 mg by mouth daily  Refills: 0     vitamin D3 50 mcg (2000 units) tablet  Commonly known as: CHOLECALCIFEROL  Used for: Multiple closed pelvic fractures with disruption of pelvic [place], initial encounter (H), Age related osteoporosis, unspecified pathological fracture presence      Dose: 1 tablet  Take 1 tablet (50 mcg) by mouth daily  Quantity: 30 tablet  Refills: 0            ASSESSMENT/PLAN  Encounter Diagnoses   Name Primary?     Closed displaced fracture of shaft of right clavicle with routine healing, subsequent encounter Yes     Physical deconditioning      Pain  management      Right clavicle fracture follow-up with orthopedics, nonoperative, pain control    Right pubic Rami fracture follow-up with orthopedics, pain control    Pain management Tylenol every 6 hours as needed  gabapentin 300 mg at bedtime, oxycodone 2.5 mg every 6 hours as needed     hyperlipidemia on atorvastatin 20 mg at bedtime,    Edema spironolactone 25 mg one time a day    Anticoagulation management on enoxaparin 40 mg subcutaneous daily x30 days which will end on 8/7/2022    Thrombocytopenia platelets now normal at 265      DISCHARGE PLAN/FACE TO FACE:  I certify that services are/were furnished while this patient was under the care of a physician and that a physician or an allowed non-physician practitioner (NPP), had a face-to-face encounter that meets the physician face-to-face encounter requirements. The encounter was in whole, or in part, related to the primary reason for home health. The patient is confined to his/her home and needs intermittent skilled nursing, physical therapy, speech-language pathology, or the continued need for occupational therapy. A plan of care has been established by a physician and is periodically reviewed by a physician.  Date of Face-to-Face Encounter: 7/25/2022    I certify that, based on my findings, the following services are medically necessary home health services: PT OT home health aide    My clinical findings support the need for the above skilled services because: PT OT for continued strength and endurance following recent right pelvic fracture and right clavicle fracture    This patient is homebound because: She is deconditioned following recent pelvic and clavicle fracture on the right side    The patient is, or has been, under my care and I have initiated the establishment of the plan of care. This patient will be followed by a physician who will periodically review the plan of care.    Schedule follow up visit with primary care provider within 7 days to  reestablish care.    Electronically signed by: Angella Ward CNP            Sincerely,        Angella Ward CNP

## 2022-07-29 ENCOUNTER — NURSE TRIAGE (OUTPATIENT)
Dept: NURSING | Facility: CLINIC | Age: 82
End: 2022-07-29

## 2022-07-29 NOTE — TELEPHONE ENCOUNTER
Patient reports she  was discharged from  [facility] and a TCU  after  a fall with a pelvic fx;  was sent home on Lovenox to continue until 08/06 but no one taught her how to inject herself   Inquiring why she  still needs to take it    Patient's [facility] is [facility]   Caller is advised to contact on call provider for  [facility] for  further advisement   understands and will comply   Adeline Cannon RN  FNA          Reason for Disposition    [1] Caller has URGENT medication question about med that PCP or specialist prescribed AND [2] triager unable to answer question    Protocols used: MEDICATION QUESTION CALL-A-

## 2022-08-05 ENCOUNTER — ANCILLARY PROCEDURE (OUTPATIENT)
Dept: CARDIOLOGY | Facility: CLINIC | Age: 82
End: 2022-08-05
Attending: INTERNAL MEDICINE
Payer: MEDICARE

## 2022-08-05 ENCOUNTER — OFFICE VISIT (OUTPATIENT)
Dept: CARDIOLOGY | Facility: CLINIC | Age: 82
End: 2022-08-05
Payer: MEDICARE

## 2022-08-05 VITALS
DIASTOLIC BLOOD PRESSURE: 56 MMHG | HEART RATE: 68 BPM | OXYGEN SATURATION: 98 % | WEIGHT: 104.3 LBS | RESPIRATION RATE: 16 BRPM | BODY MASS INDEX: 19.08 KG/M2 | SYSTOLIC BLOOD PRESSURE: 98 MMHG

## 2022-08-05 DIAGNOSIS — I42.8 NON-ISCHEMIC CARDIOMYOPATHY (H): ICD-10-CM

## 2022-08-05 DIAGNOSIS — Z95.0 STATUS POST BIVENTRICULAR PACEMAKER: ICD-10-CM

## 2022-08-05 DIAGNOSIS — I49.5 SICK SINUS SYNDROME (H): Primary | ICD-10-CM

## 2022-08-05 DIAGNOSIS — I44.2 COMPLETE HEART BLOCK (H): Primary | ICD-10-CM

## 2022-08-05 PROCEDURE — 99214 OFFICE O/P EST MOD 30 MIN: CPT | Performed by: INTERNAL MEDICINE

## 2022-08-05 PROCEDURE — 93281 PM DEVICE PROGR EVAL MULTI: CPT | Performed by: INTERNAL MEDICINE

## 2022-08-05 NOTE — LETTER
8/5/2022    Brittany Man MD  [facility]   [address] [place] [address]    RE: Helena Brown       Dear Colleague,     I had the pleasure of seeing Helena Brown in the [facility].    HEART CARE ENCOUNTER CONSULTATON NOTE      [facility]  [phone]      Assessment/Recommendations   Assessment:   1. 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    Device interrogation from today which was personally viewed demonstrated 100% biventricular pacing.  Normal device function.  No significant events noted on device interrogation.  Normal lead function.  Battery life 7 years.     2.  History of congestive heart failure secondary pacer induced.  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  LVEF: 60% (prior 35-40%).    3.  History of pulmonary embolism  4.  Hyperlipidemia    Plan:  1.  Continue with routine device interrogations.  2.  Continue atorvastatin for hyperlipidemia  3.  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    Since last clinical evaluation the patient sustained a significant fall resulting in hip fracture requiring prolonged rehab and transitional care.  She has since recovered and doing quite well.  She is currently walking with no assistance.    She currently denies any dyspnea on exertion.  Denies any chest pain.  She does have some fatigue with prolonged exertion which she says is improving with ongoing exercise.  She currently can walk about a mile.    Denies any lightheadedness.  No issues at her device site.  Personally viewed today's interrogation from clinic today.  Demonstrates normal device function.   No sustained ventricular or atrial arrhythmias.  Normal lead function.    NM Stress:      The nuclear stress test is negative for inducible myocardial ischemia or infarction.     The left ventricular ejection fraction at stress is greater than 70%.     The patient is at a low risk of future cardiac ischemic events.     A prior study was conducted on 12/23/2013.  Prior images were unavailable for comparison     Echocardiogram Results:  1. Normal left ventricular size and systolic performance with a visually  estimated ejection fraction of 60%.  2. There is mildly abnormal septal motion consistent with ventricular paced  rhythm.  3. There is mild aortic insufficiency.  4. Normal right ventricular size and systolic performance.       Physical Examination  Review of Systems   Vitals: BP 98/56 (BP Location: Right arm, Patient Position: Sitting, Cuff Size: Adult Regular)   Pulse 68   Resp 16   Wt 47.3 kg (104 lb 4.8 oz)   SpO2 98%   BMI 19.08 kg/m    BMI= Body mass index is 19.08 kg/m .  Wt Readings from Last 3 Encounters:   08/05/22 47.3 kg (104 lb 4.8 oz)   07/25/22 47.2 kg (104 lb)   07/18/22 47 kg (103 lb 9.6 oz)        Pleasant   ENT/Mouth: membranes moist, no oral lesions or bleeding gums.      EYES:  no scleral icterus, normal conjunctivae       Chest/Lungs:   lungs are clear to auscultation, no rales or wheezing, device in left upper chest wall without issues with skin integrity., equal chest wall expansion    Cardiovascular:   regular. Normal first and second heart sounds with no murmurs, rubs, or gallops; the carotid, radial and posterior tibial pulses are intact, Jugular venous pressure normal, trace edema bilaterally    Abdomen:  no tenderness; bowel sounds are present   Extremities: no cyanosis or clubbing   Skin: no xanthelasma, warm.    Neurologic: normal  bilateral, no tremors     Psychiatric: alert and oriented x3, calm        Please refer above for cardiac ROS details.        Medical History   Surgical History Family History Social History   Past Medical History:   Diagnosis Date     Anxiety      Arthritis      Asthma      Colitis     Collagenous     Complete heart block (H)      Hypertriglyceridemia      Macular degeneration      Osteopenia      Osteoporosis      Pacemaker      Pulmonary embolism (H)      Restless legs      Skin cancer, basal cell      Past Surgical History:   Procedure Laterality Date     APPENDECTOMY       ARTHROSCOPY SHOULDER ROTATOR CUFF REPAIR       BREAST EXCISIONAL BIOPSY Right [id number]     BREAST SURGERY      biopsy     BUNIONECTOMY       EP PACEMAKER INSERT N/A 3/29/2019    Aaron Babcock MD;  Location: [facility];  Service: Cardiology     IR EXTREMITY VENOGRAM LEFT  2019     IR EXTREMITY VENOGRAM LEFT  2019     TONSILLECTOMY       Z TOTAL KNEE ARTHROPLASTY Left 2018    Procedure:  LEFT MINIMALLY INVASIVE TOTAL KNEE ARTHROPLASTY;  Surgeon: Segun Banks MD;  Location: [facility];  Service: Orthopedics     [facility] TOTAL KNEE ARTHROPLASTY Right 2018    Procedure: RIGHT MINIMALLY INVASIVE TOTAL KNEE ARTHROPLASTY;  Surgeon: Segun Banks MD;  Location: [facility];  Service: Orthopedics     Family History   Problem Relation Age of Onset     Breast Cancer Cousin      Heart Disease Mother      Alzheimer Disease Father      Clotting Disorder No family hx of         Social History     Socioeconomic History     Marital status:      Spouse name: Not on file     Number of children: Not on file     Years of education: Not on file     Highest education level: Not on file   Occupational History     Not on file   Tobacco Use     Smoking status: Former Smoker     Packs/day: 0.50     Years: 2.00     Pack years: 1.00     Quit date: 1960     Years since quittin.7     Smokeless tobacco: Never Used     Tobacco comment: hasn't smoke in years   Substance and Sexual Activity     Alcohol use: Yes     Alcohol/week: 7.0 standard drinks      Comment: Alcoholic Drinks/day: per week     Drug use: No     Sexual activity: Not on file   Other Topics Concern     Not on file   Social History Narrative     Not on file     Social Determinants of Health     Financial Resource Strain: Not on file   Food Insecurity: Not on file   Transportation Needs: Not on file   Physical Activity: Not on file   Stress: Not on file   Social Connections: Not on file   Intimate Partner Violence: Not on file   Housing Stability: Not on file           Medications  Allergies   Current Outpatient Medications   Medication Sig Dispense Refill     acetaminophen (TYLENOL) 325 MG tablet Take 1-2 tablets (325-650 mg) by mouth every 6 hours as needed for mild pain 60 tablet 0     atorvastatin (LIPITOR) 20 MG tablet Take 20 mg by mouth At Bedtime       calcitonin, salmon, (MIACALCIN) 200 UNIT/ACT nasal spray Spray 1 spray into one nostril alternating nostrils daily Alternate nostril each day. 3.7 mL 0     calcium citrate (CITRACAL) 950 (200 Ca) MG tablet Take 1 tablet by mouth daily       cyanocobalamin (VITAMIN B-12) 100 MCG tablet Take 100 mcg by mouth daily       gabapentin (NEURONTIN) 300 MG capsule Take 300 mg by mouth At Bedtime       melatonin 5 MG CAPS Take 5 mg by mouth every evening as needed       spironolactone (ALDACTONE) 25 MG tablet Take 25 mg by mouth daily       vitamin D3 (CHOLECALCIFEROL) 50 mcg (2000 units) tablet Take 1 tablet (50 mcg) by mouth daily 30 tablet 0     enoxaparin ANTICOAGULANT (LOVENOX) 40 MG/0.4ML syringe Inject 0.4 mLs (40 mg) Subcutaneous every 24 hours for 30 days (Patient not taking: Reported on 8/5/2022) 12 mL 0     oxyCODONE (ROXICODONE) 5 MG tablet Take 0.5 tablets (2.5 mg) by mouth every 6 hours as needed for pain (Patient not taking: Reported on 8/5/2022) 6 tablet 0       Allergies   Allergen Reactions     Nsaids Other (See Comments)     Patient has history of microcolitis.  GI aggravation     Sulfa Drugs      Made her eyes stingy- it was  eyedrops  Eye drops caused burning          Lab Results    Chemistry/lipid CBC Cardiac Enzymes/BNP/TSH/INR   Recent Labs   Lab Test 07/15/21  1006   CHOL 151   HDL 68   LDL 70   TRIG 66     Recent Labs   Lab Test 07/15/21  1006 07/23/20  1425 06/06/19  1015   LDL 70 149* 113     Recent Labs   Lab Test 07/12/22  0815      POTASSIUM 4.4   CHLORIDE 101   CO2 23   GLC 78   BUN 22.7   CR 0.63   GFRESTIMATED 88   MARYANN 9.3     Recent Labs   Lab Test 07/12/22  0815 07/07/22  0557 07/04/22  2127   CR 0.63 0.69 0.86     No results for input(s): A1C in the last 61807 hours.       Recent Labs   Lab Test 07/12/22  0815   WBC 6.4   HGB 11.4*   HCT 35.4   MCV 94        Recent Labs   Lab Test 07/12/22  0815 07/07/22  0557 07/06/22  0830   HGB 11.4* 11.1* 11.5*    Recent Labs   Lab Test 04/26/19  1655 03/28/19  1546   TROPONINI 0.01 <0.01     Recent Labs   Lab Test 06/04/19  0946 04/26/19  1655 04/23/19  1051    84 100     Recent Labs   Lab Test 03/28/19  1956   TSH 1.44     Recent Labs   Lab Test 07/07/22  0557 08/13/19  0625 08/12/19  0646   INR 1.01 1.97* 1.86*        Reese Tom DO    Thank you for allowing me to participate in the care of your patient.    Sincerely,     Reese Tom DO   [facility]  cc: No referring provider defined for this encounter.

## 2022-08-05 NOTE — PROGRESS NOTES
HEART CARE ENCOUNTER CONSULTATON NOTE      [facility]  [phone]      Assessment/Recommendations   Assessment:   1. 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    Device interrogation from today which was personally viewed demonstrated 100% biventricular pacing.  Normal device function.  No significant events noted on device interrogation.  Normal lead function.  Battery life 7 years.     2.  History of congestive heart failure secondary pacer induced.  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  LVEF: 60% (prior 35-40%).    3.  History of pulmonary embolism  4.  Hyperlipidemia    Plan:  1.  Continue with routine device interrogations.  2.  Continue atorvastatin for hyperlipidemia  3.  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    Since last clinical evaluation the patient sustained a significant fall resulting in hip fracture requiring prolonged rehab and transitional care.  She has since recovered and doing quite well.  She is currently walking with no assistance.    She currently denies any dyspnea on exertion.  Denies any chest pain.  She does have some fatigue with prolonged exertion which she says is improving with ongoing exercise.  She currently can walk about a mile.    Denies any lightheadedness.  No issues at her device site.  Personally viewed today's interrogation from clinic today.  Demonstrates normal device function.  No sustained ventricular or atrial arrhythmias.  Normal lead function.    NM Stress:      The nuclear stress test is negative for inducible myocardial ischemia or infarction.     The left ventricular ejection fraction at  stress is greater than 70%.     The patient is at a low risk of future cardiac ischemic events.     A prior study was conducted on 12/23/2013.  Prior images were unavailable for comparison     Echocardiogram Results:  1. Normal left ventricular size and systolic performance with a visually  estimated ejection fraction of 60%.  2. There is mildly abnormal septal motion consistent with ventricular paced  rhythm.  3. There is mild aortic insufficiency.  4. Normal right ventricular size and systolic performance.       Physical Examination  Review of Systems   Vitals: BP 98/56 (BP Location: Right arm, Patient Position: Sitting, Cuff Size: Adult Regular)   Pulse 68   Resp 16   Wt 47.3 kg (104 lb 4.8 oz)   SpO2 98%   BMI 19.08 kg/m    BMI= Body mass index is 19.08 kg/m .  Wt Readings from Last 3 Encounters:   08/05/22 47.3 kg (104 lb 4.8 oz)   07/25/22 47.2 kg (104 lb)   07/18/22 47 kg (103 lb 9.6 oz)        Pleasant   ENT/Mouth: membranes moist, no oral lesions or bleeding gums.      EYES:  no scleral icterus, normal conjunctivae       Chest/Lungs:   lungs are clear to auscultation, no rales or wheezing, device in left upper chest wall without issues with skin integrity., equal chest wall expansion    Cardiovascular:   regular. Normal first and second heart sounds with no murmurs, rubs, or gallops; the carotid, radial and posterior tibial pulses are intact, Jugular venous pressure normal, trace edema bilaterally    Abdomen:  no tenderness; bowel sounds are present   Extremities: no cyanosis or clubbing   Skin: no xanthelasma, warm.    Neurologic: normal  bilateral, no tremors     Psychiatric: alert and oriented x3, calm        Please refer above for cardiac ROS details.        Medical History  Surgical History Family History Social History   Past Medical History:   Diagnosis Date     Anxiety      Arthritis      Asthma      Colitis     Collagenous     Complete heart block (H)      Hypertriglyceridemia      Macular  degeneration      Osteopenia      Osteoporosis      Pacemaker      Pulmonary embolism (H)      Restless legs      Skin cancer, basal cell      Past Surgical History:   Procedure Laterality Date     APPENDECTOMY       ARTHROSCOPY SHOULDER ROTATOR CUFF REPAIR       BREAST EXCISIONAL BIOPSY Right [id number]     BREAST SURGERY      biopsy     BUNIONECTOMY       EP PACEMAKER INSERT N/A 3/29/2019    aAron Babcock MD;  Location: [facility];  Service: Cardiology     IR EXTREMITY VENOGRAM LEFT  2019     IR EXTREMITY VENOGRAM LEFT  2019     TONSILLECTOMY       Z TOTAL KNEE ARTHROPLASTY Left 2018    Procedure:  LEFT MINIMALLY INVASIVE TOTAL KNEE ARTHROPLASTY;  Surgeon: Segun Banks MD;  Location: [facility];  Service: Orthopedics     [facility] TOTAL KNEE ARTHROPLASTY Right 2018    Procedure: RIGHT MINIMALLY INVASIVE TOTAL KNEE ARTHROPLASTY;  Surgeon: Segun Banks MD;  Location: [facility];  Service: Orthopedics     Family History   Problem Relation Age of Onset     Breast Cancer Cousin      Heart Disease Mother      Alzheimer Disease Father      Clotting Disorder No family hx of         Social History     Socioeconomic History     Marital status:      Spouse name: Not on file     Number of children: Not on file     Years of education: Not on file     Highest education level: Not on file   Occupational History     Not on file   Tobacco Use     Smoking status: Former Smoker     Packs/day: 0.50     Years: 2.00     Pack years: 1.00     Quit date: 1960     Years since quittin.7     Smokeless tobacco: Never Used     Tobacco comment: hasn't smoke in years   Substance and Sexual Activity     Alcohol use: Yes     Alcohol/week: 7.0 standard drinks     Comment: Alcoholic Drinks/day: per week     Drug use: No     Sexual activity: Not on file   Other Topics Concern     Not on file   Social History Narrative     Not on file     Social Determinants of Health     Financial  Resource Strain: Not on file   Food Insecurity: Not on file   Transportation Needs: Not on file   Physical Activity: Not on file   Stress: Not on file   Social Connections: Not on file   Intimate Partner Violence: Not on file   Housing Stability: Not on file           Medications  Allergies   Current Outpatient Medications   Medication Sig Dispense Refill     acetaminophen (TYLENOL) 325 MG tablet Take 1-2 tablets (325-650 mg) by mouth every 6 hours as needed for mild pain 60 tablet 0     atorvastatin (LIPITOR) 20 MG tablet Take 20 mg by mouth At Bedtime       calcitonin, salmon, (MIACALCIN) 200 UNIT/ACT nasal spray Spray 1 spray into one nostril alternating nostrils daily Alternate nostril each day. 3.7 mL 0     calcium citrate (CITRACAL) 950 (200 Ca) MG tablet Take 1 tablet by mouth daily       cyanocobalamin (VITAMIN B-12) 100 MCG tablet Take 100 mcg by mouth daily       gabapentin (NEURONTIN) 300 MG capsule Take 300 mg by mouth At Bedtime       melatonin 5 MG CAPS Take 5 mg by mouth every evening as needed       spironolactone (ALDACTONE) 25 MG tablet Take 25 mg by mouth daily       vitamin D3 (CHOLECALCIFEROL) 50 mcg (2000 units) tablet Take 1 tablet (50 mcg) by mouth daily 30 tablet 0     enoxaparin ANTICOAGULANT (LOVENOX) 40 MG/0.4ML syringe Inject 0.4 mLs (40 mg) Subcutaneous every 24 hours for 30 days (Patient not taking: Reported on 8/5/2022) 12 mL 0     oxyCODONE (ROXICODONE) 5 MG tablet Take 0.5 tablets (2.5 mg) by mouth every 6 hours as needed for pain (Patient not taking: Reported on 8/5/2022) 6 tablet 0       Allergies   Allergen Reactions     Nsaids Other (See Comments)     Patient has history of microcolitis.  GI aggravation     Sulfa Drugs      Made her eyes stingy- it was eyedrops  Eye drops caused burning          Lab Results    Chemistry/lipid CBC Cardiac Enzymes/BNP/TSH/INR   Recent Labs   Lab Test 07/15/21  1006   CHOL 151   HDL 68   LDL 70   TRIG 66     Recent Labs   Lab Test 07/15/21  1006  07/23/20  1425 06/06/19  1015   LDL 70 149* 113     Recent Labs   Lab Test 07/12/22  0815      POTASSIUM 4.4   CHLORIDE 101   CO2 23   GLC 78   BUN 22.7   CR 0.63   GFRESTIMATED 88   MARYANN 9.3     Recent Labs   Lab Test 07/12/22  0815 07/07/22  0557 07/04/22  2127   CR 0.63 0.69 0.86     No results for input(s): A1C in the last 50860 hours.       Recent Labs   Lab Test 07/12/22  0815   WBC 6.4   HGB 11.4*   HCT 35.4   MCV 94        Recent Labs   Lab Test 07/12/22  0815 07/07/22  0557 07/06/22  0830   HGB 11.4* 11.1* 11.5*    Recent Labs   Lab Test 04/26/19  1655 03/28/19  1546   TROPONINI 0.01 <0.01     Recent Labs   Lab Test 06/04/19  0946 04/26/19  1655 04/23/19  1051    84 100     Recent Labs   Lab Test 03/28/19  1956   TSH 1.44     Recent Labs   Lab Test 07/07/22  0557 08/13/19  0625 08/12/19  0646   INR 1.01 1.97* 1.86*        Reese Tom DO

## 2022-08-10 LAB
MDC_IDC_LEAD_IMPLANT_DT: NORMAL
MDC_IDC_LEAD_LOCATION: NORMAL
MDC_IDC_LEAD_LOCATION_DETAIL_1: NORMAL
MDC_IDC_LEAD_MFG: NORMAL
MDC_IDC_LEAD_MODEL: NORMAL
MDC_IDC_LEAD_POLARITY_TYPE: NORMAL
MDC_IDC_LEAD_SERIAL: NORMAL
MDC_IDC_LEAD_SPECIAL_FUNCTION: NORMAL
MDC_IDC_MSMT_BATTERY_DTM: NORMAL
MDC_IDC_MSMT_BATTERY_REMAINING_LONGEVITY: 90 MO
MDC_IDC_MSMT_BATTERY_REMAINING_PERCENTAGE: 100 %
MDC_IDC_MSMT_BATTERY_STATUS: NORMAL
MDC_IDC_MSMT_LEADCHNL_LV_IMPEDANCE_VALUE: 789 OHM
MDC_IDC_MSMT_LEADCHNL_LV_PACING_THRESHOLD_AMPLITUDE: 1.6 V
MDC_IDC_MSMT_LEADCHNL_LV_PACING_THRESHOLD_PULSEWIDTH: 0.9 MS
MDC_IDC_MSMT_LEADCHNL_LV_SENSING_INTR_AMPL: 25 MV
MDC_IDC_MSMT_LEADCHNL_RA_IMPEDANCE_VALUE: 667 OHM
MDC_IDC_MSMT_LEADCHNL_RA_PACING_THRESHOLD_AMPLITUDE: 0.7 V
MDC_IDC_MSMT_LEADCHNL_RA_PACING_THRESHOLD_PULSEWIDTH: 0.4 MS
MDC_IDC_MSMT_LEADCHNL_RA_SENSING_INTR_AMPL: 2.1 MV
MDC_IDC_MSMT_LEADCHNL_RV_IMPEDANCE_VALUE: 805 OHM
MDC_IDC_MSMT_LEADCHNL_RV_PACING_THRESHOLD_AMPLITUDE: 0.7 V
MDC_IDC_MSMT_LEADCHNL_RV_PACING_THRESHOLD_PULSEWIDTH: 0.4 MS
MDC_IDC_MSMT_LEADCHNL_RV_SENSING_INTR_AMPL: 10 MV
MDC_IDC_PG_IMPLANT_DTM: NORMAL
MDC_IDC_PG_MFG: NORMAL
MDC_IDC_PG_MODEL: NORMAL
MDC_IDC_PG_SERIAL: NORMAL
MDC_IDC_PG_TYPE: NORMAL
MDC_IDC_SESS_CLINIC_NAME: NORMAL
MDC_IDC_SESS_DTM: NORMAL
MDC_IDC_SESS_TYPE: NORMAL
MDC_IDC_SET_BRADY_AT_MODE_SWITCH_MODE: NORMAL
MDC_IDC_SET_BRADY_AT_MODE_SWITCH_RATE: 170 {BEATS}/MIN
MDC_IDC_SET_BRADY_LOWRATE: 60 {BEATS}/MIN
MDC_IDC_SET_BRADY_MAX_SENSOR_RATE: 130 {BEATS}/MIN
MDC_IDC_SET_BRADY_MAX_TRACKING_RATE: 130 {BEATS}/MIN
MDC_IDC_SET_BRADY_MODE: NORMAL
MDC_IDC_SET_BRADY_PAV_DELAY_HIGH: 110 MS
MDC_IDC_SET_BRADY_PAV_DELAY_LOW: 130 MS
MDC_IDC_SET_BRADY_SAV_DELAY_HIGH: 85 MS
MDC_IDC_SET_BRADY_SAV_DELAY_LOW: 100 MS
MDC_IDC_SET_CRT_LVRV_DELAY: 0 MS
MDC_IDC_SET_CRT_PACED_CHAMBERS: NORMAL
MDC_IDC_SET_LEADCHNL_LV_PACING_AMPLITUDE: 2.6 V
MDC_IDC_SET_LEADCHNL_LV_PACING_ANODE_ELECTRODE_1: NORMAL
MDC_IDC_SET_LEADCHNL_LV_PACING_ANODE_LOCATION_1: NORMAL
MDC_IDC_SET_LEADCHNL_LV_PACING_CATHODE_ELECTRODE_1: NORMAL
MDC_IDC_SET_LEADCHNL_LV_PACING_CATHODE_LOCATION_1: NORMAL
MDC_IDC_SET_LEADCHNL_LV_PACING_PULSEWIDTH: 0.9 MS
MDC_IDC_SET_LEADCHNL_LV_SENSING_ADAPTATION_MODE: NORMAL
MDC_IDC_SET_LEADCHNL_LV_SENSING_ANODE_ELECTRODE_1: NORMAL
MDC_IDC_SET_LEADCHNL_LV_SENSING_ANODE_LOCATION_1: NORMAL
MDC_IDC_SET_LEADCHNL_LV_SENSING_CATHODE_ELECTRODE_1: NORMAL
MDC_IDC_SET_LEADCHNL_LV_SENSING_CATHODE_LOCATION_1: NORMAL
MDC_IDC_SET_LEADCHNL_LV_SENSING_SENSITIVITY: 1.5 MV
MDC_IDC_SET_LEADCHNL_RA_PACING_AMPLITUDE: 1.5 V
MDC_IDC_SET_LEADCHNL_RA_PACING_CAPTURE_MODE: NORMAL
MDC_IDC_SET_LEADCHNL_RA_PACING_POLARITY: NORMAL
MDC_IDC_SET_LEADCHNL_RA_PACING_PULSEWIDTH: 0.4 MS
MDC_IDC_SET_LEADCHNL_RA_SENSING_ADAPTATION_MODE: NORMAL
MDC_IDC_SET_LEADCHNL_RA_SENSING_POLARITY: NORMAL
MDC_IDC_SET_LEADCHNL_RA_SENSING_SENSITIVITY: 0.25 MV
MDC_IDC_SET_LEADCHNL_RV_PACING_AMPLITUDE: NORMAL
MDC_IDC_SET_LEADCHNL_RV_PACING_CAPTURE_MODE: NORMAL
MDC_IDC_SET_LEADCHNL_RV_PACING_POLARITY: NORMAL
MDC_IDC_SET_LEADCHNL_RV_PACING_PULSEWIDTH: 0.4 MS
MDC_IDC_SET_LEADCHNL_RV_SENSING_ADAPTATION_MODE: NORMAL
MDC_IDC_SET_LEADCHNL_RV_SENSING_POLARITY: NORMAL
MDC_IDC_SET_LEADCHNL_RV_SENSING_SENSITIVITY: 1.5 MV
MDC_IDC_SET_ZONE_DETECTION_INTERVAL: 375 MS
MDC_IDC_SET_ZONE_TYPE: NORMAL
MDC_IDC_SET_ZONE_VENDOR_TYPE: NORMAL
MDC_IDC_STAT_AT_BURDEN_PERCENT: 0 %
MDC_IDC_STAT_AT_DTM_END: NORMAL
MDC_IDC_STAT_AT_DTM_START: NORMAL
MDC_IDC_STAT_AT_MODE_SW_COUNT: 0
MDC_IDC_STAT_BRADY_DTM_END: NORMAL
MDC_IDC_STAT_BRADY_DTM_START: NORMAL
MDC_IDC_STAT_BRADY_RA_PERCENT_PACED: 20 %
MDC_IDC_STAT_BRADY_RV_PERCENT_PACED: 100 %
MDC_IDC_STAT_CRT_DTM_END: NORMAL
MDC_IDC_STAT_CRT_DTM_START: NORMAL
MDC_IDC_STAT_CRT_LV_PERCENT_PACED: 100 %
MDC_IDC_STAT_EPISODE_RECENT_COUNT: 0
MDC_IDC_STAT_EPISODE_RECENT_COUNT: 1
MDC_IDC_STAT_EPISODE_RECENT_COUNT_DTM_END: NORMAL
MDC_IDC_STAT_EPISODE_RECENT_COUNT_DTM_START: NORMAL
MDC_IDC_STAT_EPISODE_TYPE: NORMAL
MDC_IDC_STAT_EPISODE_VENDOR_TYPE: NORMAL

## 2022-11-07 ENCOUNTER — ANCILLARY PROCEDURE (OUTPATIENT)
Dept: CARDIOLOGY | Facility: CLINIC | Age: 82
End: 2022-11-07
Attending: INTERNAL MEDICINE
Payer: MEDICARE

## 2022-11-07 DIAGNOSIS — I42.8 NON-ISCHEMIC CARDIOMYOPATHY (H): ICD-10-CM

## 2022-11-07 DIAGNOSIS — I49.5 SICK SINUS SYNDROME (H): ICD-10-CM

## 2022-11-07 DIAGNOSIS — Z95.0 STATUS POST BIVENTRICULAR PACEMAKER: ICD-10-CM

## 2022-11-11 LAB
MDC_IDC_EPISODE_DTM: NORMAL
MDC_IDC_EPISODE_DTM: NORMAL
MDC_IDC_EPISODE_ID: NORMAL
MDC_IDC_EPISODE_ID: NORMAL
MDC_IDC_EPISODE_TYPE: NORMAL
MDC_IDC_EPISODE_TYPE: NORMAL
MDC_IDC_LEAD_IMPLANT_DT: NORMAL
MDC_IDC_LEAD_LOCATION: NORMAL
MDC_IDC_LEAD_LOCATION_DETAIL_1: NORMAL
MDC_IDC_LEAD_MFG: NORMAL
MDC_IDC_LEAD_MODEL: NORMAL
MDC_IDC_LEAD_POLARITY_TYPE: NORMAL
MDC_IDC_LEAD_SERIAL: NORMAL
MDC_IDC_LEAD_SPECIAL_FUNCTION: NORMAL
MDC_IDC_MSMT_BATTERY_DTM: NORMAL
MDC_IDC_MSMT_BATTERY_REMAINING_LONGEVITY: 90 MO
MDC_IDC_MSMT_BATTERY_REMAINING_PERCENTAGE: 98 %
MDC_IDC_MSMT_BATTERY_STATUS: NORMAL
MDC_IDC_MSMT_LEADCHNL_LV_IMPEDANCE_VALUE: 773 OHM
MDC_IDC_MSMT_LEADCHNL_LV_IMPEDANCE_VALUE: 773 OHM
MDC_IDC_MSMT_LEADCHNL_LV_PACING_THRESHOLD_AMPLITUDE: 1.6 V
MDC_IDC_MSMT_LEADCHNL_LV_PACING_THRESHOLD_PULSEWIDTH: 0.9 MS
MDC_IDC_MSMT_LEADCHNL_RA_IMPEDANCE_VALUE: 681 OHM
MDC_IDC_MSMT_LEADCHNL_RA_PACING_THRESHOLD_AMPLITUDE: 0.5 V
MDC_IDC_MSMT_LEADCHNL_RA_PACING_THRESHOLD_PULSEWIDTH: 0.4 MS
MDC_IDC_MSMT_LEADCHNL_RV_IMPEDANCE_VALUE: 831 OHM
MDC_IDC_MSMT_LEADCHNL_RV_PACING_THRESHOLD_AMPLITUDE: 0.8 V
MDC_IDC_MSMT_LEADCHNL_RV_PACING_THRESHOLD_PULSEWIDTH: 0.4 MS
MDC_IDC_PG_IMPLANT_DTM: NORMAL
MDC_IDC_PG_MFG: NORMAL
MDC_IDC_PG_MODEL: NORMAL
MDC_IDC_PG_SERIAL: NORMAL
MDC_IDC_PG_TYPE: NORMAL
MDC_IDC_SESS_CLINIC_NAME: NORMAL
MDC_IDC_SESS_DTM: NORMAL
MDC_IDC_SESS_TYPE: NORMAL
MDC_IDC_SET_BRADY_AT_MODE_SWITCH_MODE: NORMAL
MDC_IDC_SET_BRADY_AT_MODE_SWITCH_RATE: 170 {BEATS}/MIN
MDC_IDC_SET_BRADY_LOWRATE: 60 {BEATS}/MIN
MDC_IDC_SET_BRADY_MAX_SENSOR_RATE: 130 {BEATS}/MIN
MDC_IDC_SET_BRADY_MAX_TRACKING_RATE: 130 {BEATS}/MIN
MDC_IDC_SET_BRADY_MODE: NORMAL
MDC_IDC_SET_BRADY_PAV_DELAY_HIGH: 110 MS
MDC_IDC_SET_BRADY_PAV_DELAY_LOW: 130 MS
MDC_IDC_SET_BRADY_SAV_DELAY_HIGH: 85 MS
MDC_IDC_SET_BRADY_SAV_DELAY_LOW: 100 MS
MDC_IDC_SET_CRT_LVRV_DELAY: 0 MS
MDC_IDC_SET_CRT_PACED_CHAMBERS: NORMAL
MDC_IDC_SET_LEADCHNL_LV_PACING_AMPLITUDE: 2.6 V
MDC_IDC_SET_LEADCHNL_LV_PACING_ANODE_ELECTRODE_1: NORMAL
MDC_IDC_SET_LEADCHNL_LV_PACING_ANODE_LOCATION_1: NORMAL
MDC_IDC_SET_LEADCHNL_LV_PACING_CATHODE_ELECTRODE_1: NORMAL
MDC_IDC_SET_LEADCHNL_LV_PACING_CATHODE_LOCATION_1: NORMAL
MDC_IDC_SET_LEADCHNL_LV_PACING_PULSEWIDTH: 0.9 MS
MDC_IDC_SET_LEADCHNL_LV_SENSING_ADAPTATION_MODE: NORMAL
MDC_IDC_SET_LEADCHNL_LV_SENSING_ANODE_ELECTRODE_1: NORMAL
MDC_IDC_SET_LEADCHNL_LV_SENSING_ANODE_LOCATION_1: NORMAL
MDC_IDC_SET_LEADCHNL_LV_SENSING_CATHODE_ELECTRODE_1: NORMAL
MDC_IDC_SET_LEADCHNL_LV_SENSING_CATHODE_LOCATION_1: NORMAL
MDC_IDC_SET_LEADCHNL_LV_SENSING_SENSITIVITY: 1.5 MV
MDC_IDC_SET_LEADCHNL_RA_PACING_AMPLITUDE: 1.5 V
MDC_IDC_SET_LEADCHNL_RA_PACING_CAPTURE_MODE: NORMAL
MDC_IDC_SET_LEADCHNL_RA_PACING_POLARITY: NORMAL
MDC_IDC_SET_LEADCHNL_RA_PACING_PULSEWIDTH: 0.4 MS
MDC_IDC_SET_LEADCHNL_RA_SENSING_ADAPTATION_MODE: NORMAL
MDC_IDC_SET_LEADCHNL_RA_SENSING_POLARITY: NORMAL
MDC_IDC_SET_LEADCHNL_RA_SENSING_SENSITIVITY: 0.25 MV
MDC_IDC_SET_LEADCHNL_RV_PACING_AMPLITUDE: 2 V
MDC_IDC_SET_LEADCHNL_RV_PACING_CAPTURE_MODE: NORMAL
MDC_IDC_SET_LEADCHNL_RV_PACING_POLARITY: NORMAL
MDC_IDC_SET_LEADCHNL_RV_PACING_PULSEWIDTH: 0.4 MS
MDC_IDC_SET_LEADCHNL_RV_SENSING_ADAPTATION_MODE: NORMAL
MDC_IDC_SET_LEADCHNL_RV_SENSING_POLARITY: NORMAL
MDC_IDC_SET_LEADCHNL_RV_SENSING_SENSITIVITY: 1.5 MV
MDC_IDC_SET_ZONE_DETECTION_INTERVAL: 375 MS
MDC_IDC_SET_ZONE_TYPE: NORMAL
MDC_IDC_SET_ZONE_VENDOR_TYPE: NORMAL
MDC_IDC_STAT_AT_BURDEN_PERCENT: 0 %
MDC_IDC_STAT_AT_DTM_END: NORMAL
MDC_IDC_STAT_AT_DTM_START: NORMAL
MDC_IDC_STAT_BRADY_DTM_END: NORMAL
MDC_IDC_STAT_BRADY_DTM_START: NORMAL
MDC_IDC_STAT_BRADY_RA_PERCENT_PACED: 11 %
MDC_IDC_STAT_BRADY_RV_PERCENT_PACED: 100 %
MDC_IDC_STAT_CRT_DTM_END: NORMAL
MDC_IDC_STAT_CRT_DTM_START: NORMAL
MDC_IDC_STAT_CRT_LV_PERCENT_PACED: 100 %
MDC_IDC_STAT_EPISODE_RECENT_COUNT: 0
MDC_IDC_STAT_EPISODE_RECENT_COUNT_DTM_END: NORMAL
MDC_IDC_STAT_EPISODE_RECENT_COUNT_DTM_START: NORMAL
MDC_IDC_STAT_EPISODE_TYPE: NORMAL
MDC_IDC_STAT_EPISODE_VENDOR_TYPE: NORMAL

## 2022-11-11 PROCEDURE — 93294 REM INTERROG EVL PM/LDLS PM: CPT | Performed by: INTERNAL MEDICINE

## 2022-11-11 PROCEDURE — 93296 REM INTERROG EVL PM/IDS: CPT | Performed by: INTERNAL MEDICINE

## 2022-12-02 ENCOUNTER — HOSPITAL ENCOUNTER (OUTPATIENT)
Dept: MAMMOGRAPHY | Facility: CLINIC | Age: 82
Discharge: HOME | End: 2022-12-02
Attending: STUDENT IN AN ORGANIZED HEALTH CARE EDUCATION/TRAINING PROGRAM | Admitting: STUDENT IN AN ORGANIZED HEALTH CARE EDUCATION/TRAINING PROGRAM
Payer: MEDICARE

## 2022-12-02 DIAGNOSIS — Z12.31 SCREENING MAMMOGRAM, ENCOUNTER FOR: ICD-10-CM

## 2022-12-02 PROCEDURE — 77067 SCR MAMMO BI INCL CAD: CPT

## 2023-02-17 ENCOUNTER — ANCILLARY PROCEDURE (OUTPATIENT)
Dept: CARDIOLOGY | Facility: CLINIC | Age: 83
End: 2023-02-17
Attending: INTERNAL MEDICINE
Payer: MEDICARE

## 2023-02-17 DIAGNOSIS — I49.5 SICK SINUS SYNDROME (H): ICD-10-CM

## 2023-02-17 DIAGNOSIS — Z95.0 STATUS POST BIVENTRICULAR PACEMAKER: ICD-10-CM

## 2023-02-17 DIAGNOSIS — I42.8 NON-ISCHEMIC CARDIOMYOPATHY (H): ICD-10-CM

## 2023-02-20 LAB
MDC_IDC_EPISODE_DTM: NORMAL
MDC_IDC_EPISODE_ID: NORMAL
MDC_IDC_EPISODE_TYPE: NORMAL
MDC_IDC_LEAD_IMPLANT_DT: NORMAL
MDC_IDC_LEAD_LOCATION: NORMAL
MDC_IDC_LEAD_LOCATION_DETAIL_1: NORMAL
MDC_IDC_LEAD_MFG: NORMAL
MDC_IDC_LEAD_MODEL: NORMAL
MDC_IDC_LEAD_POLARITY_TYPE: NORMAL
MDC_IDC_LEAD_SERIAL: NORMAL
MDC_IDC_LEAD_SPECIAL_FUNCTION: NORMAL
MDC_IDC_MSMT_BATTERY_DTM: NORMAL
MDC_IDC_MSMT_BATTERY_REMAINING_LONGEVITY: 84 MO
MDC_IDC_MSMT_BATTERY_REMAINING_PERCENTAGE: 95 %
MDC_IDC_MSMT_BATTERY_STATUS: NORMAL
MDC_IDC_MSMT_LEADCHNL_LV_IMPEDANCE_VALUE: 691 OHM
MDC_IDC_MSMT_LEADCHNL_LV_IMPEDANCE_VALUE: 691 OHM
MDC_IDC_MSMT_LEADCHNL_LV_PACING_THRESHOLD_AMPLITUDE: 1.6 V
MDC_IDC_MSMT_LEADCHNL_LV_PACING_THRESHOLD_PULSEWIDTH: 0.9 MS
MDC_IDC_MSMT_LEADCHNL_RA_IMPEDANCE_VALUE: 732 OHM
MDC_IDC_MSMT_LEADCHNL_RA_PACING_THRESHOLD_AMPLITUDE: 0.5 V
MDC_IDC_MSMT_LEADCHNL_RA_PACING_THRESHOLD_PULSEWIDTH: 0.4 MS
MDC_IDC_MSMT_LEADCHNL_RV_IMPEDANCE_VALUE: 809 OHM
MDC_IDC_MSMT_LEADCHNL_RV_PACING_THRESHOLD_AMPLITUDE: 0.7 V
MDC_IDC_MSMT_LEADCHNL_RV_PACING_THRESHOLD_PULSEWIDTH: 0.4 MS
MDC_IDC_PG_IMPLANT_DTM: NORMAL
MDC_IDC_PG_MFG: NORMAL
MDC_IDC_PG_MODEL: NORMAL
MDC_IDC_PG_SERIAL: NORMAL
MDC_IDC_PG_TYPE: NORMAL
MDC_IDC_SESS_CLINIC_NAME: NORMAL
MDC_IDC_SESS_DTM: NORMAL
MDC_IDC_SESS_TYPE: NORMAL
MDC_IDC_SET_BRADY_AT_MODE_SWITCH_MODE: NORMAL
MDC_IDC_SET_BRADY_AT_MODE_SWITCH_RATE: 170 {BEATS}/MIN
MDC_IDC_SET_BRADY_LOWRATE: 60 {BEATS}/MIN
MDC_IDC_SET_BRADY_MAX_SENSOR_RATE: 130 {BEATS}/MIN
MDC_IDC_SET_BRADY_MAX_TRACKING_RATE: 130 {BEATS}/MIN
MDC_IDC_SET_BRADY_MODE: NORMAL
MDC_IDC_SET_BRADY_PAV_DELAY_HIGH: 110 MS
MDC_IDC_SET_BRADY_PAV_DELAY_LOW: 130 MS
MDC_IDC_SET_BRADY_SAV_DELAY_HIGH: 85 MS
MDC_IDC_SET_BRADY_SAV_DELAY_LOW: 100 MS
MDC_IDC_SET_CRT_LVRV_DELAY: 0 MS
MDC_IDC_SET_CRT_PACED_CHAMBERS: NORMAL
MDC_IDC_SET_LEADCHNL_LV_PACING_AMPLITUDE: 2.6 V
MDC_IDC_SET_LEADCHNL_LV_PACING_ANODE_ELECTRODE_1: NORMAL
MDC_IDC_SET_LEADCHNL_LV_PACING_ANODE_LOCATION_1: NORMAL
MDC_IDC_SET_LEADCHNL_LV_PACING_CATHODE_ELECTRODE_1: NORMAL
MDC_IDC_SET_LEADCHNL_LV_PACING_CATHODE_LOCATION_1: NORMAL
MDC_IDC_SET_LEADCHNL_LV_PACING_PULSEWIDTH: 0.9 MS
MDC_IDC_SET_LEADCHNL_LV_SENSING_ADAPTATION_MODE: NORMAL
MDC_IDC_SET_LEADCHNL_LV_SENSING_ANODE_ELECTRODE_1: NORMAL
MDC_IDC_SET_LEADCHNL_LV_SENSING_ANODE_LOCATION_1: NORMAL
MDC_IDC_SET_LEADCHNL_LV_SENSING_CATHODE_ELECTRODE_1: NORMAL
MDC_IDC_SET_LEADCHNL_LV_SENSING_CATHODE_LOCATION_1: NORMAL
MDC_IDC_SET_LEADCHNL_LV_SENSING_SENSITIVITY: 1.5 MV
MDC_IDC_SET_LEADCHNL_RA_PACING_AMPLITUDE: 1.5 V
MDC_IDC_SET_LEADCHNL_RA_PACING_CAPTURE_MODE: NORMAL
MDC_IDC_SET_LEADCHNL_RA_PACING_POLARITY: NORMAL
MDC_IDC_SET_LEADCHNL_RA_PACING_PULSEWIDTH: 0.4 MS
MDC_IDC_SET_LEADCHNL_RA_SENSING_ADAPTATION_MODE: NORMAL
MDC_IDC_SET_LEADCHNL_RA_SENSING_POLARITY: NORMAL
MDC_IDC_SET_LEADCHNL_RA_SENSING_SENSITIVITY: 0.25 MV
MDC_IDC_SET_LEADCHNL_RV_PACING_AMPLITUDE: 2 V
MDC_IDC_SET_LEADCHNL_RV_PACING_CAPTURE_MODE: NORMAL
MDC_IDC_SET_LEADCHNL_RV_PACING_POLARITY: NORMAL
MDC_IDC_SET_LEADCHNL_RV_PACING_PULSEWIDTH: 0.4 MS
MDC_IDC_SET_LEADCHNL_RV_SENSING_ADAPTATION_MODE: NORMAL
MDC_IDC_SET_LEADCHNL_RV_SENSING_POLARITY: NORMAL
MDC_IDC_SET_LEADCHNL_RV_SENSING_SENSITIVITY: 1.5 MV
MDC_IDC_SET_ZONE_DETECTION_INTERVAL: 375 MS
MDC_IDC_SET_ZONE_TYPE: NORMAL
MDC_IDC_SET_ZONE_VENDOR_TYPE: NORMAL
MDC_IDC_STAT_AT_BURDEN_PERCENT: 0 %
MDC_IDC_STAT_AT_DTM_END: NORMAL
MDC_IDC_STAT_AT_DTM_START: NORMAL
MDC_IDC_STAT_BRADY_DTM_END: NORMAL
MDC_IDC_STAT_BRADY_DTM_START: NORMAL
MDC_IDC_STAT_BRADY_RA_PERCENT_PACED: 13 %
MDC_IDC_STAT_BRADY_RV_PERCENT_PACED: 100 %
MDC_IDC_STAT_CRT_DTM_END: NORMAL
MDC_IDC_STAT_CRT_DTM_START: NORMAL
MDC_IDC_STAT_CRT_LV_PERCENT_PACED: 100 %
MDC_IDC_STAT_EPISODE_RECENT_COUNT: 0
MDC_IDC_STAT_EPISODE_RECENT_COUNT_DTM_END: NORMAL
MDC_IDC_STAT_EPISODE_RECENT_COUNT_DTM_START: NORMAL
MDC_IDC_STAT_EPISODE_TYPE: NORMAL
MDC_IDC_STAT_EPISODE_VENDOR_TYPE: NORMAL

## 2023-02-20 PROCEDURE — 93294 REM INTERROG EVL PM/LDLS PM: CPT | Performed by: INTERNAL MEDICINE

## 2023-02-20 PROCEDURE — 93296 REM INTERROG EVL PM/IDS: CPT | Performed by: INTERNAL MEDICINE

## 2023-05-26 ENCOUNTER — ANCILLARY PROCEDURE (OUTPATIENT)
Dept: CARDIOLOGY | Facility: CLINIC | Age: 83
End: 2023-05-26
Attending: INTERNAL MEDICINE
Payer: MEDICARE

## 2023-05-26 DIAGNOSIS — I42.8 NON-ISCHEMIC CARDIOMYOPATHY (H): ICD-10-CM

## 2023-05-26 DIAGNOSIS — I49.5 SICK SINUS SYNDROME (H): ICD-10-CM

## 2023-05-26 DIAGNOSIS — Z95.0 STATUS POST BIVENTRICULAR PACEMAKER: ICD-10-CM

## 2023-05-30 LAB
MDC_IDC_EPISODE_DTM: NORMAL
MDC_IDC_EPISODE_DURATION: 1 S
MDC_IDC_EPISODE_DURATION: 15 S
MDC_IDC_EPISODE_ID: NORMAL
MDC_IDC_EPISODE_TYPE: NORMAL
MDC_IDC_LEAD_IMPLANT_DT: NORMAL
MDC_IDC_LEAD_LOCATION: NORMAL
MDC_IDC_LEAD_LOCATION_DETAIL_1: NORMAL
MDC_IDC_LEAD_MFG: NORMAL
MDC_IDC_LEAD_MODEL: NORMAL
MDC_IDC_LEAD_POLARITY_TYPE: NORMAL
MDC_IDC_LEAD_SERIAL: NORMAL
MDC_IDC_LEAD_SPECIAL_FUNCTION: NORMAL
MDC_IDC_MSMT_BATTERY_DTM: NORMAL
MDC_IDC_MSMT_BATTERY_REMAINING_LONGEVITY: 84 MO
MDC_IDC_MSMT_BATTERY_REMAINING_PERCENTAGE: 94 %
MDC_IDC_MSMT_BATTERY_STATUS: NORMAL
MDC_IDC_MSMT_LEADCHNL_LV_IMPEDANCE_VALUE: 747 OHM
MDC_IDC_MSMT_LEADCHNL_LV_PACING_THRESHOLD_AMPLITUDE: 1.6 V
MDC_IDC_MSMT_LEADCHNL_LV_PACING_THRESHOLD_PULSEWIDTH: 0.9 MS
MDC_IDC_MSMT_LEADCHNL_RA_IMPEDANCE_VALUE: 783 OHM
MDC_IDC_MSMT_LEADCHNL_RA_PACING_THRESHOLD_AMPLITUDE: 0.5 V
MDC_IDC_MSMT_LEADCHNL_RA_PACING_THRESHOLD_PULSEWIDTH: 0.4 MS
MDC_IDC_MSMT_LEADCHNL_RV_IMPEDANCE_VALUE: 808 OHM
MDC_IDC_MSMT_LEADCHNL_RV_PACING_THRESHOLD_AMPLITUDE: 0.8 V
MDC_IDC_MSMT_LEADCHNL_RV_PACING_THRESHOLD_PULSEWIDTH: 0.4 MS
MDC_IDC_PG_IMPLANT_DTM: NORMAL
MDC_IDC_PG_MFG: NORMAL
MDC_IDC_PG_MODEL: NORMAL
MDC_IDC_PG_SERIAL: NORMAL
MDC_IDC_PG_TYPE: NORMAL
MDC_IDC_SESS_CLINIC_NAME: NORMAL
MDC_IDC_SESS_DTM: NORMAL
MDC_IDC_SESS_TYPE: NORMAL
MDC_IDC_SET_BRADY_AT_MODE_SWITCH_MODE: NORMAL
MDC_IDC_SET_BRADY_AT_MODE_SWITCH_RATE: 170 {BEATS}/MIN
MDC_IDC_SET_BRADY_LOWRATE: 60 {BEATS}/MIN
MDC_IDC_SET_BRADY_MAX_SENSOR_RATE: 130 {BEATS}/MIN
MDC_IDC_SET_BRADY_MAX_TRACKING_RATE: 130 {BEATS}/MIN
MDC_IDC_SET_BRADY_MODE: NORMAL
MDC_IDC_SET_BRADY_PAV_DELAY_HIGH: 110 MS
MDC_IDC_SET_BRADY_PAV_DELAY_LOW: 130 MS
MDC_IDC_SET_BRADY_SAV_DELAY_HIGH: 85 MS
MDC_IDC_SET_BRADY_SAV_DELAY_LOW: 100 MS
MDC_IDC_SET_CRT_LVRV_DELAY: 0 MS
MDC_IDC_SET_CRT_PACED_CHAMBERS: NORMAL
MDC_IDC_SET_LEADCHNL_LV_PACING_AMPLITUDE: 2.6 V
MDC_IDC_SET_LEADCHNL_LV_PACING_ANODE_ELECTRODE_1: NORMAL
MDC_IDC_SET_LEADCHNL_LV_PACING_ANODE_LOCATION_1: NORMAL
MDC_IDC_SET_LEADCHNL_LV_PACING_CATHODE_ELECTRODE_1: NORMAL
MDC_IDC_SET_LEADCHNL_LV_PACING_CATHODE_LOCATION_1: NORMAL
MDC_IDC_SET_LEADCHNL_LV_PACING_PULSEWIDTH: 0.9 MS
MDC_IDC_SET_LEADCHNL_LV_SENSING_ADAPTATION_MODE: NORMAL
MDC_IDC_SET_LEADCHNL_LV_SENSING_ANODE_ELECTRODE_1: NORMAL
MDC_IDC_SET_LEADCHNL_LV_SENSING_ANODE_LOCATION_1: NORMAL
MDC_IDC_SET_LEADCHNL_LV_SENSING_CATHODE_ELECTRODE_1: NORMAL
MDC_IDC_SET_LEADCHNL_LV_SENSING_CATHODE_LOCATION_1: NORMAL
MDC_IDC_SET_LEADCHNL_LV_SENSING_SENSITIVITY: 1.5 MV
MDC_IDC_SET_LEADCHNL_RA_PACING_AMPLITUDE: 1.5 V
MDC_IDC_SET_LEADCHNL_RA_PACING_CAPTURE_MODE: NORMAL
MDC_IDC_SET_LEADCHNL_RA_PACING_POLARITY: NORMAL
MDC_IDC_SET_LEADCHNL_RA_PACING_PULSEWIDTH: 0.4 MS
MDC_IDC_SET_LEADCHNL_RA_SENSING_ADAPTATION_MODE: NORMAL
MDC_IDC_SET_LEADCHNL_RA_SENSING_POLARITY: NORMAL
MDC_IDC_SET_LEADCHNL_RA_SENSING_SENSITIVITY: 0.25 MV
MDC_IDC_SET_LEADCHNL_RV_PACING_AMPLITUDE: 2 V
MDC_IDC_SET_LEADCHNL_RV_PACING_CAPTURE_MODE: NORMAL
MDC_IDC_SET_LEADCHNL_RV_PACING_POLARITY: NORMAL
MDC_IDC_SET_LEADCHNL_RV_PACING_PULSEWIDTH: 0.4 MS
MDC_IDC_SET_LEADCHNL_RV_SENSING_ADAPTATION_MODE: NORMAL
MDC_IDC_SET_LEADCHNL_RV_SENSING_POLARITY: NORMAL
MDC_IDC_SET_LEADCHNL_RV_SENSING_SENSITIVITY: 1.5 MV
MDC_IDC_SET_ZONE_DETECTION_INTERVAL: 375 MS
MDC_IDC_SET_ZONE_TYPE: NORMAL
MDC_IDC_SET_ZONE_VENDOR_TYPE: NORMAL
MDC_IDC_STAT_AT_BURDEN_PERCENT: 1 %
MDC_IDC_STAT_AT_DTM_END: NORMAL
MDC_IDC_STAT_AT_DTM_START: NORMAL
MDC_IDC_STAT_BRADY_DTM_END: NORMAL
MDC_IDC_STAT_BRADY_DTM_START: NORMAL
MDC_IDC_STAT_BRADY_RA_PERCENT_PACED: 19 %
MDC_IDC_STAT_BRADY_RV_PERCENT_PACED: 100 %
MDC_IDC_STAT_CRT_DTM_END: NORMAL
MDC_IDC_STAT_CRT_DTM_START: NORMAL
MDC_IDC_STAT_CRT_LV_PERCENT_PACED: 100 %
MDC_IDC_STAT_EPISODE_RECENT_COUNT: 0
MDC_IDC_STAT_EPISODE_RECENT_COUNT: 1
MDC_IDC_STAT_EPISODE_RECENT_COUNT: 1
MDC_IDC_STAT_EPISODE_RECENT_COUNT_DTM_END: NORMAL
MDC_IDC_STAT_EPISODE_RECENT_COUNT_DTM_START: NORMAL
MDC_IDC_STAT_EPISODE_TYPE: NORMAL
MDC_IDC_STAT_EPISODE_VENDOR_TYPE: NORMAL

## 2023-05-30 PROCEDURE — 93296 REM INTERROG EVL PM/IDS: CPT | Performed by: INTERNAL MEDICINE

## 2023-05-30 PROCEDURE — 93294 REM INTERROG EVL PM/LDLS PM: CPT | Performed by: INTERNAL MEDICINE

## 2023-08-14 ENCOUNTER — ANCILLARY PROCEDURE (OUTPATIENT)
Dept: CARDIOLOGY | Facility: CLINIC | Age: 83
End: 2023-08-14
Attending: INTERNAL MEDICINE
Payer: MEDICARE

## 2023-08-14 DIAGNOSIS — Z95.0 STATUS POST BIVENTRICULAR PACEMAKER: ICD-10-CM

## 2023-08-14 DIAGNOSIS — I42.8 NON-ISCHEMIC CARDIOMYOPATHY (H): ICD-10-CM

## 2023-08-14 DIAGNOSIS — I49.5 SICK SINUS SYNDROME (H): ICD-10-CM

## 2023-08-14 DIAGNOSIS — I44.2 THIRD DEGREE AV BLOCK (H): Primary | ICD-10-CM

## 2023-08-14 PROCEDURE — 93281 PM DEVICE PROGR EVAL MULTI: CPT | Performed by: INTERNAL MEDICINE

## 2023-08-15 LAB
MDC_IDC_LEAD_IMPLANT_DT: NORMAL
MDC_IDC_LEAD_LOCATION: NORMAL
MDC_IDC_LEAD_LOCATION_DETAIL_1: NORMAL
MDC_IDC_LEAD_MFG: NORMAL
MDC_IDC_LEAD_MODEL: NORMAL
MDC_IDC_LEAD_POLARITY_TYPE: NORMAL
MDC_IDC_LEAD_SERIAL: NORMAL
MDC_IDC_LEAD_SPECIAL_FUNCTION: NORMAL
MDC_IDC_MSMT_BATTERY_DTM: NORMAL
MDC_IDC_MSMT_BATTERY_REMAINING_LONGEVITY: 84 MO
MDC_IDC_MSMT_BATTERY_REMAINING_PERCENTAGE: 92 %
MDC_IDC_MSMT_BATTERY_STATUS: NORMAL
MDC_IDC_MSMT_LEADCHNL_LV_IMPEDANCE_VALUE: 674 OHM
MDC_IDC_MSMT_LEADCHNL_LV_PACING_THRESHOLD_AMPLITUDE: 1.7 V
MDC_IDC_MSMT_LEADCHNL_LV_PACING_THRESHOLD_PULSEWIDTH: 0.9 MS
MDC_IDC_MSMT_LEADCHNL_RA_IMPEDANCE_VALUE: 787 OHM
MDC_IDC_MSMT_LEADCHNL_RA_PACING_THRESHOLD_AMPLITUDE: 0.9 V
MDC_IDC_MSMT_LEADCHNL_RA_PACING_THRESHOLD_PULSEWIDTH: 0.4 MS
MDC_IDC_MSMT_LEADCHNL_RV_IMPEDANCE_VALUE: 837 OHM
MDC_IDC_MSMT_LEADCHNL_RV_PACING_THRESHOLD_AMPLITUDE: 0.9 V
MDC_IDC_MSMT_LEADCHNL_RV_PACING_THRESHOLD_PULSEWIDTH: 0.4 MS
MDC_IDC_PG_IMPLANT_DTM: NORMAL
MDC_IDC_PG_MFG: NORMAL
MDC_IDC_PG_MODEL: NORMAL
MDC_IDC_PG_SERIAL: NORMAL
MDC_IDC_PG_TYPE: NORMAL
MDC_IDC_SESS_CLINIC_NAME: NORMAL
MDC_IDC_SESS_DTM: NORMAL
MDC_IDC_SESS_TYPE: NORMAL
MDC_IDC_SET_BRADY_AT_MODE_SWITCH_MODE: NORMAL
MDC_IDC_SET_BRADY_AT_MODE_SWITCH_RATE: 170 {BEATS}/MIN
MDC_IDC_SET_BRADY_LOWRATE: 60 {BEATS}/MIN
MDC_IDC_SET_BRADY_MAX_SENSOR_RATE: 130 {BEATS}/MIN
MDC_IDC_SET_BRADY_MAX_TRACKING_RATE: 130 {BEATS}/MIN
MDC_IDC_SET_BRADY_MODE: NORMAL
MDC_IDC_SET_BRADY_PAV_DELAY_HIGH: 110 MS
MDC_IDC_SET_BRADY_PAV_DELAY_LOW: 130 MS
MDC_IDC_SET_BRADY_SAV_DELAY_HIGH: 85 MS
MDC_IDC_SET_BRADY_SAV_DELAY_LOW: 100 MS
MDC_IDC_SET_CRT_LVRV_DELAY: 0 MS
MDC_IDC_SET_CRT_PACED_CHAMBERS: NORMAL
MDC_IDC_SET_LEADCHNL_LV_PACING_AMPLITUDE: 2.6 V
MDC_IDC_SET_LEADCHNL_LV_PACING_ANODE_ELECTRODE_1: NORMAL
MDC_IDC_SET_LEADCHNL_LV_PACING_ANODE_LOCATION_1: NORMAL
MDC_IDC_SET_LEADCHNL_LV_PACING_CATHODE_ELECTRODE_1: NORMAL
MDC_IDC_SET_LEADCHNL_LV_PACING_CATHODE_LOCATION_1: NORMAL
MDC_IDC_SET_LEADCHNL_LV_PACING_PULSEWIDTH: 0.9 MS
MDC_IDC_SET_LEADCHNL_LV_SENSING_ADAPTATION_MODE: NORMAL
MDC_IDC_SET_LEADCHNL_LV_SENSING_ANODE_ELECTRODE_1: NORMAL
MDC_IDC_SET_LEADCHNL_LV_SENSING_ANODE_LOCATION_1: NORMAL
MDC_IDC_SET_LEADCHNL_LV_SENSING_CATHODE_ELECTRODE_1: NORMAL
MDC_IDC_SET_LEADCHNL_LV_SENSING_CATHODE_LOCATION_1: NORMAL
MDC_IDC_SET_LEADCHNL_LV_SENSING_SENSITIVITY: 1.5 MV
MDC_IDC_SET_LEADCHNL_RA_PACING_AMPLITUDE: 1.5 V
MDC_IDC_SET_LEADCHNL_RA_PACING_CAPTURE_MODE: NORMAL
MDC_IDC_SET_LEADCHNL_RA_PACING_POLARITY: NORMAL
MDC_IDC_SET_LEADCHNL_RA_PACING_PULSEWIDTH: 0.4 MS
MDC_IDC_SET_LEADCHNL_RA_SENSING_ADAPTATION_MODE: NORMAL
MDC_IDC_SET_LEADCHNL_RA_SENSING_POLARITY: NORMAL
MDC_IDC_SET_LEADCHNL_RA_SENSING_SENSITIVITY: 0.25 MV
MDC_IDC_SET_LEADCHNL_RV_PACING_AMPLITUDE: NORMAL
MDC_IDC_SET_LEADCHNL_RV_PACING_CAPTURE_MODE: NORMAL
MDC_IDC_SET_LEADCHNL_RV_PACING_POLARITY: NORMAL
MDC_IDC_SET_LEADCHNL_RV_PACING_PULSEWIDTH: 0.4 MS
MDC_IDC_SET_LEADCHNL_RV_SENSING_ADAPTATION_MODE: NORMAL
MDC_IDC_SET_LEADCHNL_RV_SENSING_POLARITY: NORMAL
MDC_IDC_SET_LEADCHNL_RV_SENSING_SENSITIVITY: 1.5 MV
MDC_IDC_SET_ZONE_DETECTION_INTERVAL: 375 MS
MDC_IDC_SET_ZONE_TYPE: NORMAL
MDC_IDC_SET_ZONE_VENDOR_TYPE: NORMAL
MDC_IDC_STAT_AT_BURDEN_PERCENT: 1 %
MDC_IDC_STAT_AT_DTM_END: NORMAL
MDC_IDC_STAT_AT_DTM_START: NORMAL
MDC_IDC_STAT_AT_MODE_SW_COUNT: 1
MDC_IDC_STAT_BRADY_DTM_END: NORMAL
MDC_IDC_STAT_BRADY_DTM_START: NORMAL
MDC_IDC_STAT_BRADY_RA_PERCENT_PACED: 23 %
MDC_IDC_STAT_BRADY_RV_PERCENT_PACED: 100 %
MDC_IDC_STAT_CRT_DTM_END: NORMAL
MDC_IDC_STAT_CRT_DTM_START: NORMAL
MDC_IDC_STAT_CRT_LV_PERCENT_PACED: 100 %
MDC_IDC_STAT_EPISODE_RECENT_COUNT: 0
MDC_IDC_STAT_EPISODE_RECENT_COUNT: 1
MDC_IDC_STAT_EPISODE_RECENT_COUNT: 1
MDC_IDC_STAT_EPISODE_RECENT_COUNT_DTM_END: NORMAL
MDC_IDC_STAT_EPISODE_RECENT_COUNT_DTM_START: NORMAL
MDC_IDC_STAT_EPISODE_TYPE: NORMAL
MDC_IDC_STAT_EPISODE_VENDOR_TYPE: NORMAL

## 2023-08-23 ENCOUNTER — LAB REQUISITION (OUTPATIENT)
Dept: LAB | Facility: CLINIC | Age: 83
End: 2023-08-23
Payer: MEDICARE

## 2023-08-23 DIAGNOSIS — Z01.818 ENCOUNTER FOR OTHER PREPROCEDURAL EXAMINATION: ICD-10-CM

## 2023-08-23 LAB
ANION GAP SERPL CALCULATED.3IONS-SCNC: 12 MMOL/L (ref 7–15)
BUN SERPL-MCNC: 28.1 MG/DL (ref 8–23)
CALCIUM SERPL-MCNC: 9.2 MG/DL (ref 8.8–10.2)
CHLORIDE SERPL-SCNC: 106 MMOL/L (ref 98–107)
CREAT SERPL-MCNC: 1.09 MG/DL (ref 0.51–0.95)
DEPRECATED HCO3 PLAS-SCNC: 23 MMOL/L (ref 22–29)
GFR SERPL CREATININE-BSD FRML MDRD: 50 ML/MIN/1.73M2
GLUCOSE SERPL-MCNC: 145 MG/DL (ref 70–99)
POTASSIUM SERPL-SCNC: 5.1 MMOL/L (ref 3.4–5.3)
SODIUM SERPL-SCNC: 141 MMOL/L (ref 136–145)

## 2023-08-23 PROCEDURE — 80048 BASIC METABOLIC PNL TOTAL CA: CPT | Mod: ORL | Performed by: STUDENT IN AN ORGANIZED HEALTH CARE EDUCATION/TRAINING PROGRAM

## 2023-09-11 ENCOUNTER — LAB REQUISITION (OUTPATIENT)
Dept: LAB | Facility: CLINIC | Age: 83
End: 2023-09-11
Payer: MEDICARE

## 2023-09-11 DIAGNOSIS — I25.10 ATHEROSCLEROTIC HEART DISEASE OF NATIVE CORONARY ARTERY WITHOUT ANGINA PECTORIS: ICD-10-CM

## 2023-09-11 LAB
CHOLEST SERPL-MCNC: 139 MG/DL
HDLC SERPL-MCNC: 68 MG/DL
LDLC SERPL CALC-MCNC: 58 MG/DL
NONHDLC SERPL-MCNC: 71 MG/DL
TRIGL SERPL-MCNC: 66 MG/DL

## 2023-09-11 PROCEDURE — 80061 LIPID PANEL: CPT | Mod: ORL | Performed by: NURSE PRACTITIONER

## 2023-09-19 ENCOUNTER — OFFICE VISIT (OUTPATIENT)
Dept: CARDIOLOGY | Facility: CLINIC | Age: 83
End: 2023-09-19
Payer: MEDICARE

## 2023-09-19 VITALS
SYSTOLIC BLOOD PRESSURE: 96 MMHG | WEIGHT: 110.3 LBS | DIASTOLIC BLOOD PRESSURE: 72 MMHG | HEIGHT: 62 IN | RESPIRATION RATE: 16 BRPM | BODY MASS INDEX: 20.3 KG/M2 | HEART RATE: 72 BPM

## 2023-09-19 DIAGNOSIS — R00.2 PALPITATIONS: ICD-10-CM

## 2023-09-19 DIAGNOSIS — I44.2 ATRIOVENTRICULAR BLOCK, COMPLETE (H): Primary | ICD-10-CM

## 2023-09-19 DIAGNOSIS — I25.10 CORONARY ARTERY DISEASE INVOLVING NATIVE CORONARY ARTERY OF NATIVE HEART WITHOUT ANGINA PECTORIS: ICD-10-CM

## 2023-09-19 DIAGNOSIS — Z95.0 S/P PLACEMENT OF CARDIAC PACEMAKER: ICD-10-CM

## 2023-09-19 DIAGNOSIS — I47.10 SVT (SUPRAVENTRICULAR TACHYCARDIA) (H): ICD-10-CM

## 2023-09-19 PROCEDURE — 99214 OFFICE O/P EST MOD 30 MIN: CPT | Performed by: INTERNAL MEDICINE

## 2023-09-19 RX ORDER — MULTIVITAMIN WITH IRON
1 TABLET ORAL DAILY
COMMUNITY

## 2023-09-19 RX ORDER — KETOCONAZOLE 20 MG/G
CREAM TOPICAL PRN
COMMUNITY
Start: 2022-10-05

## 2023-09-19 RX ORDER — ASPIRIN 81 MG/1
81 TABLET ORAL DAILY
COMMUNITY

## 2023-09-19 NOTE — CONFIDENTIAL NOTE
HEART CARE ENCOUNTER CONSULTATON NOTE      [facility]  [phone]      Assessment/Recommendations   Assessment:   1. 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    Device interrogation from today which was personally viewed demonstrated 100% biventricular pacing.  Normal device function.  No significant events noted on device interrogation (with exception of a brief episode of SVT).    2.  History of congestive heart failure secondary pacer induced.  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  LVEF: 60% (prior 35-40%).    3.  History of pulmonary embolism  4.  Hyperlipidemia  5.  Occasional palpitations  6.  Nonobstructive coronary artery disease    Plan:  1.  Continue with routine device interrogations.  Reviewed in detail with patient.  2.  Continue atorvastatin for hyperlipidemia  3.  Continue spironolactone for heart failure with improved ejection fraction  4.  Patient to write down when she feels palpitations         History of Present Illness/Subjective    HPI: Helena Brown is a 83 year old female history of complete AV block status post pacemaker, pacemaker induced cardiomyopathy now with heart failure with improved ejection fraction, who presents cardiology clinic in follow-up.      Since last clinic evaluation patient is done well.  She continues to be active.  She walks 2 miles per day at a fast pace.  Denies any dyspnea with walking hills.  Denies orthopnea or PND symptoms.  No significant lower extremity edema.  No pain over device.    Device interrogation demonstrated no significant ventricular arrhythmias.  She did have 1 episode of SVT for 15 seconds.  She does occasionally feel palpitations.  These are forceful beats lasting for less than a minute.  She does not recall anything in May.  We will have her write down any events.    She compliant medications.  Blood pressure controlled.     NM Stress:     The nuclear stress test is negative for  "inducible myocardial ischemia or infarction.    The left ventricular ejection fraction at stress is greater than 70%.    The patient is at a low risk of future cardiac ischemic events.    A prior study was conducted on 12/23/2013.  Prior images were unavailable for comparison     Echocardiogram Results:  1. Normal left ventricular size and systolic performance with a visually  estimated ejection fraction of 60%.  2. There is mildly abnormal septal motion consistent with ventricular paced  rhythm.  3. There is mild aortic insufficiency.  4. Normal right ventricular size and systolic performance.       Physical Examination  Review of Systems   Vitals: BP 96/72 (BP Location: Right arm, Patient Position: Sitting, Cuff Size: Adult Regular)   Pulse 72   Resp 16   Ht 1.575 m (5' 2\")   Wt 50 kg (110 lb 4.8 oz)   BMI 20.17 kg/m    BMI= Body mass index is 20.17 kg/m .  Wt Readings from Last 3 Encounters:   09/19/23 50 kg (110 lb 4.8 oz)   08/05/22 47.3 kg (104 lb 4.8 oz)   07/25/22 47.2 kg (104 lb)        Pleasant   ENT/Mouth: membranes moist, no oral lesions or bleeding gums.      EYES:  no scleral icterus, normal conjunctivae       Chest/Lungs:   lungs are clear to auscultation, no rales or wheezing, device in left upper chest wall.     Cardiovascular:   Regular. Normal first and second heart sounds with no murmurs, rubs, or gallops; the carotid, radial and posterior tibial pulses are intact, Jugular venous pressure normal, trace edema bilaterally    Abdomen:  no tenderness; bowel sounds are present   Extremities: no cyanosis or clubbing   Skin: no xanthelasma, warm.    Neurologic: normal  bilateral, no tremors     Psychiatric: alert and oriented x3, calm        Please refer above for cardiac ROS details.        Medical History  Surgical History Family History Social History   Past Medical History:   Diagnosis Date    Anxiety     Arthritis     Asthma     Colitis     Collagenous    Complete heart block (H)     " Hypertriglyceridemia     Macular degeneration     Osteopenia     Osteoporosis     Pacemaker     Pulmonary embolism (H)     Restless legs     Skin cancer, basal cell      Past Surgical History:   Procedure Laterality Date    APPENDECTOMY      ARTHROSCOPY SHOULDER ROTATOR CUFF REPAIR      BREAST EXCISIONAL BIOPSY Right [id number]    BREAST SURGERY      biopsy    BUNIONECTOMY      EP PACEMAKER INSERT N/A 3/29/2019    Aaron Babcock MD;  Location: [facility];  Service: Cardiology    IR EXTREMITY VENOGRAM LEFT  2019    IR EXTREMITY VENOGRAM LEFT  2019    TONSILLECTOMY      ZZC TOTAL KNEE ARTHROPLASTY Left 2018    Procedure:  LEFT MINIMALLY INVASIVE TOTAL KNEE ARTHROPLASTY;  Surgeon: Segun Banks MD;  Location: [facility];  Service: Orthopedics    [facility] TOTAL KNEE ARTHROPLASTY Right 2018    Procedure: RIGHT MINIMALLY INVASIVE TOTAL KNEE ARTHROPLASTY;  Surgeon: Segun Banks MD;  Location: [facility];  Service: Orthopedics     Family History   Problem Relation Age of Onset    Breast Cancer Cousin     Heart Disease Mother     Alzheimer Disease Father     Clotting Disorder No family hx of         Social History     Socioeconomic History    Marital status:      Spouse name: Not on file    Number of children: Not on file    Years of education: Not on file    Highest education level: Not on file   Occupational History    Not on file   Tobacco Use    Smoking status: Former     Packs/day: 0.50     Years: 2.00     Pack years: 1.00     Types: Cigarettes     Quit date: 1960     Years since quittin.9    Smokeless tobacco: Never    Tobacco comments:     hasn't smoke in years   Vaping Use    Vaping Use: Never used   Substance and Sexual Activity    Alcohol use: Yes     Alcohol/week: 7.0 standard drinks of alcohol     Comment: Alcoholic Drinks/day: per week    Drug use: No    Sexual activity: Not on file   Other Topics Concern    Not on file   Social History Narrative     Not on file     Social Determinants of Health     Financial Resource Strain: Not on file   Food Insecurity: Not on file   Transportation Needs: Not on file   Physical Activity: Not on file   Stress: Not on file   Social Connections: Not on file   Intimate Partner Violence: Not on file   Housing Stability: Not on file           Medications  Allergies   Current Outpatient Medications   Medication Sig Dispense Refill    acetaminophen (TYLENOL) 325 MG tablet Take 1-2 tablets (325-650 mg) by mouth every 6 hours as needed for mild pain 60 tablet 0    aspirin 81 MG EC tablet Take 81 mg by mouth daily      atorvastatin (LIPITOR) 20 MG tablet Take 20 mg by mouth At Bedtime      calcium citrate (CITRACAL) 950 (200 Ca) MG tablet Take 1 tablet by mouth daily      cyanocobalamin (VITAMIN B-12) 100 MCG tablet Take 100 mcg by mouth daily      gabapentin (NEURONTIN) 300 MG capsule Take 300 mg by mouth At Bedtime      ketoconazole (NIZORAL) 2 % external cream Apply topically as needed      magnesium 250 MG tablet Take 1 tablet by mouth daily      melatonin 5 MG CAPS Take 5 mg by mouth every evening as needed      spironolactone (ALDACTONE) 25 MG tablet Take 25 mg by mouth daily      vitamin D3 (CHOLECALCIFEROL) 50 mcg (2000 units) tablet Take 1 tablet (50 mcg) by mouth daily 30 tablet 0       Allergies   Allergen Reactions    Nsaids Other (See Comments)     Patient has history of microcolitis.  GI aggravation    Sulfa Antibiotics      Made her eyes stingy- it was eyedrops  Eye drops caused burning          Lab Results    Chemistry/lipid CBC Cardiac Enzymes/BNP/TSH/INR   Recent Labs   Lab Test 07/15/21  1006   CHOL 151   HDL 68   LDL 70   TRIG 66     Recent Labs   Lab Test 07/15/21  1006 07/23/20  1425 06/06/19  1015   LDL 70 149* 113     Recent Labs   Lab Test 07/12/22  0815      POTASSIUM 4.4   CHLORIDE 101   CO2 23   GLC 78   BUN 22.7   CR 0.63   GFRESTIMATED 88   MARYANN 9.3     Recent Labs   Lab Test 07/12/22  0815  07/07/22  0557 07/04/22  2127   CR 0.63 0.69 0.86     No results for input(s): A1C in the last 66179 hours.       Recent Labs   Lab Test 07/12/22  0815   WBC 6.4   HGB 11.4*   HCT 35.4   MCV 94        Recent Labs   Lab Test 07/12/22  0815 07/07/22  0557 07/06/22  0830   HGB 11.4* 11.1* 11.5*    Recent Labs   Lab Test 04/26/19  1655 03/28/19  1546   TROPONINI 0.01 <0.01     Recent Labs   Lab Test 06/04/19  0946 04/26/19  1655 04/23/19  1051    84 100     Recent Labs   Lab Test 03/28/19  1956   TSH 1.44     Recent Labs   Lab Test 07/07/22  0557 08/13/19  0625 08/12/19  0646   INR 1.01 1.97* 1.86*        Reese Tom, DO

## 2023-09-19 NOTE — LETTER
9/19/2023    Brittany Man MD  [address] [place] [address]    RE: Helena Brown       Dear Colleague,     I had the pleasure of seeing Helena Brown in the [facility].  No notes on file    Thank you for allowing me to participate in the care of your patient.      Sincerely,     DO PAM Turpin [facility]  cc:   No referring provider defined for this encounter.

## 2023-11-14 ENCOUNTER — ANCILLARY PROCEDURE (OUTPATIENT)
Dept: CARDIOLOGY | Facility: CLINIC | Age: 83
End: 2023-11-14
Attending: INTERNAL MEDICINE
Payer: MEDICARE

## 2023-11-14 DIAGNOSIS — Z95.0 CARDIAC PACEMAKER IN SITU: ICD-10-CM

## 2023-11-14 DIAGNOSIS — I49.5 SICK SINUS SYNDROME (H): ICD-10-CM

## 2023-11-14 DIAGNOSIS — R00.1 BRADYCARDIA: ICD-10-CM

## 2023-11-14 LAB
MDC_IDC_EPISODE_DTM: NORMAL
MDC_IDC_EPISODE_DTM: NORMAL
MDC_IDC_EPISODE_ID: NORMAL
MDC_IDC_EPISODE_ID: NORMAL
MDC_IDC_EPISODE_TYPE: NORMAL
MDC_IDC_EPISODE_TYPE: NORMAL
MDC_IDC_LEAD_CONNECTION_STATUS: NORMAL
MDC_IDC_LEAD_IMPLANT_DT: NORMAL
MDC_IDC_LEAD_LOCATION: NORMAL
MDC_IDC_LEAD_LOCATION_DETAIL_1: NORMAL
MDC_IDC_LEAD_MFG: NORMAL
MDC_IDC_LEAD_MODEL: NORMAL
MDC_IDC_LEAD_POLARITY_TYPE: NORMAL
MDC_IDC_LEAD_SERIAL: NORMAL
MDC_IDC_LEAD_SPECIAL_FUNCTION: NORMAL
MDC_IDC_MSMT_BATTERY_DTM: NORMAL
MDC_IDC_MSMT_BATTERY_REMAINING_LONGEVITY: 78 MO
MDC_IDC_MSMT_BATTERY_REMAINING_PERCENTAGE: 86 %
MDC_IDC_MSMT_BATTERY_STATUS: NORMAL
MDC_IDC_MSMT_LEADCHNL_LV_IMPEDANCE_VALUE: 669 OHM
MDC_IDC_MSMT_LEADCHNL_LV_PACING_THRESHOLD_AMPLITUDE: 1.7 V
MDC_IDC_MSMT_LEADCHNL_LV_PACING_THRESHOLD_PULSEWIDTH: 0.9 MS
MDC_IDC_MSMT_LEADCHNL_RA_IMPEDANCE_VALUE: 763 OHM
MDC_IDC_MSMT_LEADCHNL_RA_PACING_THRESHOLD_AMPLITUDE: 0.4 V
MDC_IDC_MSMT_LEADCHNL_RA_PACING_THRESHOLD_PULSEWIDTH: 0.4 MS
MDC_IDC_MSMT_LEADCHNL_RV_IMPEDANCE_VALUE: 761 OHM
MDC_IDC_MSMT_LEADCHNL_RV_PACING_THRESHOLD_AMPLITUDE: 0.7 V
MDC_IDC_MSMT_LEADCHNL_RV_PACING_THRESHOLD_PULSEWIDTH: 0.4 MS
MDC_IDC_PG_IMPLANT_DTM: NORMAL
MDC_IDC_PG_MFG: NORMAL
MDC_IDC_PG_MODEL: NORMAL
MDC_IDC_PG_SERIAL: NORMAL
MDC_IDC_PG_TYPE: NORMAL
MDC_IDC_SESS_CLINIC_NAME: NORMAL
MDC_IDC_SESS_DTM: NORMAL
MDC_IDC_SESS_TYPE: NORMAL
MDC_IDC_SET_BRADY_AT_MODE_SWITCH_MODE: NORMAL
MDC_IDC_SET_BRADY_AT_MODE_SWITCH_RATE: 170 {BEATS}/MIN
MDC_IDC_SET_BRADY_LOWRATE: 60 {BEATS}/MIN
MDC_IDC_SET_BRADY_MAX_SENSOR_RATE: 130 {BEATS}/MIN
MDC_IDC_SET_BRADY_MAX_TRACKING_RATE: 130 {BEATS}/MIN
MDC_IDC_SET_BRADY_MODE: NORMAL
MDC_IDC_SET_BRADY_PAV_DELAY_HIGH: 110 MS
MDC_IDC_SET_BRADY_PAV_DELAY_LOW: 130 MS
MDC_IDC_SET_BRADY_SAV_DELAY_HIGH: 85 MS
MDC_IDC_SET_BRADY_SAV_DELAY_LOW: 100 MS
MDC_IDC_SET_CRT_LVRV_DELAY: 0 MS
MDC_IDC_SET_CRT_PACED_CHAMBERS: NORMAL
MDC_IDC_SET_LEADCHNL_LV_PACING_AMPLITUDE: 2.6 V
MDC_IDC_SET_LEADCHNL_LV_PACING_ANODE_ELECTRODE_1: NORMAL
MDC_IDC_SET_LEADCHNL_LV_PACING_ANODE_LOCATION_1: NORMAL
MDC_IDC_SET_LEADCHNL_LV_PACING_CATHODE_ELECTRODE_1: NORMAL
MDC_IDC_SET_LEADCHNL_LV_PACING_CATHODE_LOCATION_1: NORMAL
MDC_IDC_SET_LEADCHNL_LV_PACING_PULSEWIDTH: 0.9 MS
MDC_IDC_SET_LEADCHNL_LV_SENSING_ADAPTATION_MODE: NORMAL
MDC_IDC_SET_LEADCHNL_LV_SENSING_ANODE_ELECTRODE_1: NORMAL
MDC_IDC_SET_LEADCHNL_LV_SENSING_ANODE_LOCATION_1: NORMAL
MDC_IDC_SET_LEADCHNL_LV_SENSING_CATHODE_ELECTRODE_1: NORMAL
MDC_IDC_SET_LEADCHNL_LV_SENSING_CATHODE_LOCATION_1: NORMAL
MDC_IDC_SET_LEADCHNL_LV_SENSING_SENSITIVITY: 1.5 MV
MDC_IDC_SET_LEADCHNL_RA_PACING_AMPLITUDE: 1.5 V
MDC_IDC_SET_LEADCHNL_RA_PACING_CAPTURE_MODE: NORMAL
MDC_IDC_SET_LEADCHNL_RA_PACING_POLARITY: NORMAL
MDC_IDC_SET_LEADCHNL_RA_PACING_PULSEWIDTH: 0.4 MS
MDC_IDC_SET_LEADCHNL_RA_SENSING_ADAPTATION_MODE: NORMAL
MDC_IDC_SET_LEADCHNL_RA_SENSING_POLARITY: NORMAL
MDC_IDC_SET_LEADCHNL_RA_SENSING_SENSITIVITY: 0.25 MV
MDC_IDC_SET_LEADCHNL_RV_PACING_AMPLITUDE: 2 V
MDC_IDC_SET_LEADCHNL_RV_PACING_CAPTURE_MODE: NORMAL
MDC_IDC_SET_LEADCHNL_RV_PACING_POLARITY: NORMAL
MDC_IDC_SET_LEADCHNL_RV_PACING_PULSEWIDTH: 0.4 MS
MDC_IDC_SET_LEADCHNL_RV_SENSING_ADAPTATION_MODE: NORMAL
MDC_IDC_SET_LEADCHNL_RV_SENSING_POLARITY: NORMAL
MDC_IDC_SET_LEADCHNL_RV_SENSING_SENSITIVITY: 1.5 MV
MDC_IDC_SET_ZONE_DETECTION_INTERVAL: 375 MS
MDC_IDC_SET_ZONE_STATUS: NORMAL
MDC_IDC_SET_ZONE_TYPE: NORMAL
MDC_IDC_SET_ZONE_VENDOR_TYPE: NORMAL
MDC_IDC_STAT_AT_BURDEN_PERCENT: 0 %
MDC_IDC_STAT_AT_DTM_END: NORMAL
MDC_IDC_STAT_AT_DTM_START: NORMAL
MDC_IDC_STAT_BRADY_DTM_END: NORMAL
MDC_IDC_STAT_BRADY_DTM_START: NORMAL
MDC_IDC_STAT_BRADY_RA_PERCENT_PACED: 31 %
MDC_IDC_STAT_BRADY_RV_PERCENT_PACED: 100 %
MDC_IDC_STAT_CRT_DTM_END: NORMAL
MDC_IDC_STAT_CRT_DTM_START: NORMAL
MDC_IDC_STAT_CRT_LV_PERCENT_PACED: 100 %
MDC_IDC_STAT_EPISODE_RECENT_COUNT: 0
MDC_IDC_STAT_EPISODE_RECENT_COUNT_DTM_END: NORMAL
MDC_IDC_STAT_EPISODE_RECENT_COUNT_DTM_START: NORMAL
MDC_IDC_STAT_EPISODE_TYPE: NORMAL
MDC_IDC_STAT_EPISODE_VENDOR_TYPE: NORMAL
MDC_IDC_STAT_EPISODE_VENDOR_TYPE: NORMAL

## 2023-11-14 PROCEDURE — 93294 REM INTERROG EVL PM/LDLS PM: CPT | Performed by: INTERNAL MEDICINE

## 2023-11-14 PROCEDURE — 93296 REM INTERROG EVL PM/IDS: CPT | Performed by: INTERNAL MEDICINE

## 2023-12-08 ENCOUNTER — HOSPITAL ENCOUNTER (OUTPATIENT)
Dept: MAMMOGRAPHY | Facility: CLINIC | Age: 83
Discharge: HOME | End: 2023-12-08
Admitting: STUDENT IN AN ORGANIZED HEALTH CARE EDUCATION/TRAINING PROGRAM
Payer: MEDICARE

## 2023-12-08 DIAGNOSIS — Z12.31 VISIT FOR SCREENING MAMMOGRAM: ICD-10-CM

## 2023-12-08 PROCEDURE — 77067 SCR MAMMO BI INCL CAD: CPT

## 2024-02-26 ENCOUNTER — ANCILLARY PROCEDURE (OUTPATIENT)
Dept: CARDIOLOGY | Facility: CLINIC | Age: 84
End: 2024-02-26
Attending: INTERNAL MEDICINE
Payer: MEDICARE

## 2024-02-26 DIAGNOSIS — R00.1 BRADYCARDIA: ICD-10-CM

## 2024-02-26 DIAGNOSIS — I49.5 SICK SINUS SYNDROME (H): ICD-10-CM

## 2024-02-26 DIAGNOSIS — Z95.0 CARDIAC PACEMAKER IN SITU: ICD-10-CM

## 2024-02-28 LAB
MDC_IDC_EPISODE_DTM: NORMAL
MDC_IDC_EPISODE_ID: NORMAL
MDC_IDC_EPISODE_TYPE: NORMAL
MDC_IDC_LEAD_CONNECTION_STATUS: NORMAL
MDC_IDC_LEAD_IMPLANT_DT: NORMAL
MDC_IDC_LEAD_LOCATION: NORMAL
MDC_IDC_LEAD_LOCATION_DETAIL_1: NORMAL
MDC_IDC_LEAD_MFG: NORMAL
MDC_IDC_LEAD_MODEL: NORMAL
MDC_IDC_LEAD_POLARITY_TYPE: NORMAL
MDC_IDC_LEAD_SERIAL: NORMAL
MDC_IDC_LEAD_SPECIAL_FUNCTION: NORMAL
MDC_IDC_MSMT_BATTERY_DTM: NORMAL
MDC_IDC_MSMT_BATTERY_REMAINING_LONGEVITY: 72 MO
MDC_IDC_MSMT_BATTERY_REMAINING_PERCENTAGE: 83 %
MDC_IDC_MSMT_BATTERY_STATUS: NORMAL
MDC_IDC_MSMT_LEADCHNL_LV_IMPEDANCE_VALUE: 671 OHM
MDC_IDC_MSMT_LEADCHNL_LV_IMPEDANCE_VALUE: 671 OHM
MDC_IDC_MSMT_LEADCHNL_LV_PACING_THRESHOLD_AMPLITUDE: 1.7 V
MDC_IDC_MSMT_LEADCHNL_LV_PACING_THRESHOLD_PULSEWIDTH: 0.9 MS
MDC_IDC_MSMT_LEADCHNL_RA_IMPEDANCE_VALUE: 646 OHM
MDC_IDC_MSMT_LEADCHNL_RA_PACING_THRESHOLD_AMPLITUDE: 0.5 V
MDC_IDC_MSMT_LEADCHNL_RA_PACING_THRESHOLD_PULSEWIDTH: 0.4 MS
MDC_IDC_MSMT_LEADCHNL_RV_IMPEDANCE_VALUE: 868 OHM
MDC_IDC_MSMT_LEADCHNL_RV_PACING_THRESHOLD_AMPLITUDE: 0.8 V
MDC_IDC_MSMT_LEADCHNL_RV_PACING_THRESHOLD_PULSEWIDTH: 0.4 MS
MDC_IDC_PG_IMPLANT_DTM: NORMAL
MDC_IDC_PG_MFG: NORMAL
MDC_IDC_PG_MODEL: NORMAL
MDC_IDC_PG_SERIAL: NORMAL
MDC_IDC_PG_TYPE: NORMAL
MDC_IDC_SESS_CLINIC_NAME: NORMAL
MDC_IDC_SESS_DTM: NORMAL
MDC_IDC_SESS_TYPE: NORMAL
MDC_IDC_SET_BRADY_AT_MODE_SWITCH_MODE: NORMAL
MDC_IDC_SET_BRADY_AT_MODE_SWITCH_RATE: 170 {BEATS}/MIN
MDC_IDC_SET_BRADY_LOWRATE: 60 {BEATS}/MIN
MDC_IDC_SET_BRADY_MAX_SENSOR_RATE: 130 {BEATS}/MIN
MDC_IDC_SET_BRADY_MAX_TRACKING_RATE: 130 {BEATS}/MIN
MDC_IDC_SET_BRADY_MODE: NORMAL
MDC_IDC_SET_BRADY_PAV_DELAY_HIGH: 110 MS
MDC_IDC_SET_BRADY_PAV_DELAY_LOW: 130 MS
MDC_IDC_SET_BRADY_SAV_DELAY_HIGH: 85 MS
MDC_IDC_SET_BRADY_SAV_DELAY_LOW: 100 MS
MDC_IDC_SET_CRT_LVRV_DELAY: 0 MS
MDC_IDC_SET_CRT_PACED_CHAMBERS: NORMAL
MDC_IDC_SET_LEADCHNL_LV_PACING_AMPLITUDE: 2.6 V
MDC_IDC_SET_LEADCHNL_LV_PACING_ANODE_ELECTRODE_1: NORMAL
MDC_IDC_SET_LEADCHNL_LV_PACING_ANODE_LOCATION_1: NORMAL
MDC_IDC_SET_LEADCHNL_LV_PACING_CATHODE_ELECTRODE_1: NORMAL
MDC_IDC_SET_LEADCHNL_LV_PACING_CATHODE_LOCATION_1: NORMAL
MDC_IDC_SET_LEADCHNL_LV_PACING_PULSEWIDTH: 0.9 MS
MDC_IDC_SET_LEADCHNL_LV_SENSING_ADAPTATION_MODE: NORMAL
MDC_IDC_SET_LEADCHNL_LV_SENSING_ANODE_ELECTRODE_1: NORMAL
MDC_IDC_SET_LEADCHNL_LV_SENSING_ANODE_LOCATION_1: NORMAL
MDC_IDC_SET_LEADCHNL_LV_SENSING_CATHODE_ELECTRODE_1: NORMAL
MDC_IDC_SET_LEADCHNL_LV_SENSING_CATHODE_LOCATION_1: NORMAL
MDC_IDC_SET_LEADCHNL_LV_SENSING_SENSITIVITY: 1.5 MV
MDC_IDC_SET_LEADCHNL_RA_PACING_AMPLITUDE: 1.5 V
MDC_IDC_SET_LEADCHNL_RA_PACING_CAPTURE_MODE: NORMAL
MDC_IDC_SET_LEADCHNL_RA_PACING_POLARITY: NORMAL
MDC_IDC_SET_LEADCHNL_RA_PACING_PULSEWIDTH: 0.4 MS
MDC_IDC_SET_LEADCHNL_RA_SENSING_ADAPTATION_MODE: NORMAL
MDC_IDC_SET_LEADCHNL_RA_SENSING_POLARITY: NORMAL
MDC_IDC_SET_LEADCHNL_RA_SENSING_SENSITIVITY: 0.25 MV
MDC_IDC_SET_LEADCHNL_RV_PACING_AMPLITUDE: 2.2 V
MDC_IDC_SET_LEADCHNL_RV_PACING_CAPTURE_MODE: NORMAL
MDC_IDC_SET_LEADCHNL_RV_PACING_POLARITY: NORMAL
MDC_IDC_SET_LEADCHNL_RV_PACING_PULSEWIDTH: 0.4 MS
MDC_IDC_SET_LEADCHNL_RV_SENSING_ADAPTATION_MODE: NORMAL
MDC_IDC_SET_LEADCHNL_RV_SENSING_POLARITY: NORMAL
MDC_IDC_SET_LEADCHNL_RV_SENSING_SENSITIVITY: 1.5 MV
MDC_IDC_SET_ZONE_DETECTION_INTERVAL: 375 MS
MDC_IDC_SET_ZONE_STATUS: NORMAL
MDC_IDC_SET_ZONE_TYPE: NORMAL
MDC_IDC_SET_ZONE_VENDOR_TYPE: NORMAL
MDC_IDC_STAT_AT_BURDEN_PERCENT: 0 %
MDC_IDC_STAT_AT_DTM_END: NORMAL
MDC_IDC_STAT_AT_DTM_START: NORMAL
MDC_IDC_STAT_BRADY_DTM_END: NORMAL
MDC_IDC_STAT_BRADY_DTM_START: NORMAL
MDC_IDC_STAT_BRADY_RA_PERCENT_PACED: 29 %
MDC_IDC_STAT_BRADY_RV_PERCENT_PACED: 100 %
MDC_IDC_STAT_CRT_DTM_END: NORMAL
MDC_IDC_STAT_CRT_DTM_START: NORMAL
MDC_IDC_STAT_CRT_LV_PERCENT_PACED: 100 %
MDC_IDC_STAT_EPISODE_RECENT_COUNT: 0
MDC_IDC_STAT_EPISODE_RECENT_COUNT_DTM_END: NORMAL
MDC_IDC_STAT_EPISODE_RECENT_COUNT_DTM_START: NORMAL
MDC_IDC_STAT_EPISODE_TYPE: NORMAL
MDC_IDC_STAT_EPISODE_VENDOR_TYPE: NORMAL

## 2024-02-28 PROCEDURE — 93296 REM INTERROG EVL PM/IDS: CPT | Performed by: INTERNAL MEDICINE

## 2024-02-28 PROCEDURE — 93294 REM INTERROG EVL PM/LDLS PM: CPT | Performed by: INTERNAL MEDICINE

## 2024-05-20 ENCOUNTER — TRANSFERRED RECORDS (OUTPATIENT)
Dept: HEALTH INFORMATION MANAGEMENT | Facility: CLINIC | Age: 84
End: 2024-05-20

## 2024-05-20 ENCOUNTER — LAB REQUISITION (OUTPATIENT)
Dept: LAB | Facility: CLINIC | Age: 84
End: 2024-05-20
Payer: MEDICARE

## 2024-05-20 DIAGNOSIS — I25.10 ATHEROSCLEROTIC HEART DISEASE OF NATIVE CORONARY ARTERY WITHOUT ANGINA PECTORIS: ICD-10-CM

## 2024-05-20 LAB
ANION GAP SERPL CALCULATED.3IONS-SCNC: 11 MMOL/L (ref 7–15)
BUN SERPL-MCNC: 25.2 MG/DL (ref 8–23)
CALCIUM SERPL-MCNC: 9 MG/DL (ref 8.8–10.2)
CHLORIDE SERPL-SCNC: 105 MMOL/L (ref 98–107)
CHOLEST SERPL-MCNC: 122 MG/DL
CREAT SERPL-MCNC: 0.83 MG/DL (ref 0.51–0.95)
DEPRECATED HCO3 PLAS-SCNC: 23 MMOL/L (ref 22–29)
EGFRCR SERPLBLD CKD-EPI 2021: 70 ML/MIN/1.73M2
FASTING STATUS PATIENT QL REPORTED: ABNORMAL
FASTING STATUS PATIENT QL REPORTED: NORMAL
GLUCOSE SERPL-MCNC: 87 MG/DL (ref 70–99)
HDLC SERPL-MCNC: 64 MG/DL
LDLC SERPL CALC-MCNC: 49 MG/DL
NONHDLC SERPL-MCNC: 58 MG/DL
POTASSIUM SERPL-SCNC: 4.9 MMOL/L (ref 3.4–5.3)
SODIUM SERPL-SCNC: 139 MMOL/L (ref 135–145)
TRIGL SERPL-MCNC: 43 MG/DL

## 2024-05-20 PROCEDURE — 80061 LIPID PANEL: CPT | Mod: ORL | Performed by: STUDENT IN AN ORGANIZED HEALTH CARE EDUCATION/TRAINING PROGRAM

## 2024-05-20 PROCEDURE — 80048 BASIC METABOLIC PNL TOTAL CA: CPT | Mod: ORL | Performed by: STUDENT IN AN ORGANIZED HEALTH CARE EDUCATION/TRAINING PROGRAM

## 2024-06-04 ENCOUNTER — ANCILLARY PROCEDURE (OUTPATIENT)
Dept: CARDIOLOGY | Facility: CLINIC | Age: 84
End: 2024-06-04
Attending: INTERNAL MEDICINE
Payer: MEDICARE

## 2024-06-04 DIAGNOSIS — Z95.0 CARDIAC PACEMAKER IN SITU: ICD-10-CM

## 2024-06-04 DIAGNOSIS — I49.5 SICK SINUS SYNDROME (H): ICD-10-CM

## 2024-06-04 DIAGNOSIS — R00.1 BRADYCARDIA: ICD-10-CM

## 2024-06-07 LAB
MDC_IDC_EPISODE_DTM: NORMAL
MDC_IDC_EPISODE_ID: NORMAL
MDC_IDC_EPISODE_TYPE: NORMAL
MDC_IDC_LEAD_CONNECTION_STATUS: NORMAL
MDC_IDC_LEAD_IMPLANT_DT: NORMAL
MDC_IDC_LEAD_LOCATION: NORMAL
MDC_IDC_LEAD_LOCATION_DETAIL_1: NORMAL
MDC_IDC_LEAD_MFG: NORMAL
MDC_IDC_LEAD_MODEL: NORMAL
MDC_IDC_LEAD_POLARITY_TYPE: NORMAL
MDC_IDC_LEAD_SERIAL: NORMAL
MDC_IDC_LEAD_SPECIAL_FUNCTION: NORMAL
MDC_IDC_MSMT_BATTERY_DTM: NORMAL
MDC_IDC_MSMT_BATTERY_REMAINING_LONGEVITY: 72 MO
MDC_IDC_MSMT_BATTERY_REMAINING_PERCENTAGE: 81 %
MDC_IDC_MSMT_BATTERY_STATUS: NORMAL
MDC_IDC_MSMT_LEADCHNL_LV_IMPEDANCE_VALUE: 725 OHM
MDC_IDC_MSMT_LEADCHNL_LV_PACING_THRESHOLD_AMPLITUDE: 1.7 V
MDC_IDC_MSMT_LEADCHNL_LV_PACING_THRESHOLD_PULSEWIDTH: 0.9 MS
MDC_IDC_MSMT_LEADCHNL_RA_IMPEDANCE_VALUE: 619 OHM
MDC_IDC_MSMT_LEADCHNL_RA_PACING_THRESHOLD_AMPLITUDE: 0.5 V
MDC_IDC_MSMT_LEADCHNL_RA_PACING_THRESHOLD_PULSEWIDTH: 0.4 MS
MDC_IDC_MSMT_LEADCHNL_RV_IMPEDANCE_VALUE: 917 OHM
MDC_IDC_MSMT_LEADCHNL_RV_PACING_THRESHOLD_AMPLITUDE: 1 V
MDC_IDC_MSMT_LEADCHNL_RV_PACING_THRESHOLD_PULSEWIDTH: 0.4 MS
MDC_IDC_PG_IMPLANT_DTM: NORMAL
MDC_IDC_PG_MFG: NORMAL
MDC_IDC_PG_MODEL: NORMAL
MDC_IDC_PG_SERIAL: NORMAL
MDC_IDC_PG_TYPE: NORMAL
MDC_IDC_SESS_CLINIC_NAME: NORMAL
MDC_IDC_SESS_DTM: NORMAL
MDC_IDC_SESS_TYPE: NORMAL
MDC_IDC_SET_BRADY_AT_MODE_SWITCH_MODE: NORMAL
MDC_IDC_SET_BRADY_AT_MODE_SWITCH_RATE: 170 {BEATS}/MIN
MDC_IDC_SET_BRADY_LOWRATE: 60 {BEATS}/MIN
MDC_IDC_SET_BRADY_MAX_SENSOR_RATE: 130 {BEATS}/MIN
MDC_IDC_SET_BRADY_MAX_TRACKING_RATE: 130 {BEATS}/MIN
MDC_IDC_SET_BRADY_MODE: NORMAL
MDC_IDC_SET_BRADY_PAV_DELAY_HIGH: 110 MS
MDC_IDC_SET_BRADY_PAV_DELAY_LOW: 130 MS
MDC_IDC_SET_BRADY_SAV_DELAY_HIGH: 85 MS
MDC_IDC_SET_BRADY_SAV_DELAY_LOW: 100 MS
MDC_IDC_SET_CRT_LVRV_DELAY: 0 MS
MDC_IDC_SET_CRT_PACED_CHAMBERS: NORMAL
MDC_IDC_SET_LEADCHNL_LV_PACING_AMPLITUDE: 2.6 V
MDC_IDC_SET_LEADCHNL_LV_PACING_ANODE_ELECTRODE_1: NORMAL
MDC_IDC_SET_LEADCHNL_LV_PACING_ANODE_LOCATION_1: NORMAL
MDC_IDC_SET_LEADCHNL_LV_PACING_CATHODE_ELECTRODE_1: NORMAL
MDC_IDC_SET_LEADCHNL_LV_PACING_CATHODE_LOCATION_1: NORMAL
MDC_IDC_SET_LEADCHNL_LV_PACING_PULSEWIDTH: 0.9 MS
MDC_IDC_SET_LEADCHNL_LV_SENSING_ADAPTATION_MODE: NORMAL
MDC_IDC_SET_LEADCHNL_LV_SENSING_ANODE_ELECTRODE_1: NORMAL
MDC_IDC_SET_LEADCHNL_LV_SENSING_ANODE_LOCATION_1: NORMAL
MDC_IDC_SET_LEADCHNL_LV_SENSING_CATHODE_ELECTRODE_1: NORMAL
MDC_IDC_SET_LEADCHNL_LV_SENSING_CATHODE_LOCATION_1: NORMAL
MDC_IDC_SET_LEADCHNL_LV_SENSING_SENSITIVITY: 1.5 MV
MDC_IDC_SET_LEADCHNL_RA_PACING_AMPLITUDE: 1.5 V
MDC_IDC_SET_LEADCHNL_RA_PACING_CAPTURE_MODE: NORMAL
MDC_IDC_SET_LEADCHNL_RA_PACING_POLARITY: NORMAL
MDC_IDC_SET_LEADCHNL_RA_PACING_PULSEWIDTH: 0.4 MS
MDC_IDC_SET_LEADCHNL_RA_SENSING_ADAPTATION_MODE: NORMAL
MDC_IDC_SET_LEADCHNL_RA_SENSING_POLARITY: NORMAL
MDC_IDC_SET_LEADCHNL_RA_SENSING_SENSITIVITY: 0.25 MV
MDC_IDC_SET_LEADCHNL_RV_PACING_AMPLITUDE: 2.2 V
MDC_IDC_SET_LEADCHNL_RV_PACING_CAPTURE_MODE: NORMAL
MDC_IDC_SET_LEADCHNL_RV_PACING_POLARITY: NORMAL
MDC_IDC_SET_LEADCHNL_RV_PACING_PULSEWIDTH: 0.4 MS
MDC_IDC_SET_LEADCHNL_RV_SENSING_ADAPTATION_MODE: NORMAL
MDC_IDC_SET_LEADCHNL_RV_SENSING_POLARITY: NORMAL
MDC_IDC_SET_LEADCHNL_RV_SENSING_SENSITIVITY: 1.5 MV
MDC_IDC_SET_ZONE_DETECTION_INTERVAL: 375 MS
MDC_IDC_SET_ZONE_STATUS: NORMAL
MDC_IDC_SET_ZONE_TYPE: NORMAL
MDC_IDC_SET_ZONE_VENDOR_TYPE: NORMAL
MDC_IDC_STAT_AT_BURDEN_PERCENT: 0 %
MDC_IDC_STAT_AT_DTM_END: NORMAL
MDC_IDC_STAT_AT_DTM_START: NORMAL
MDC_IDC_STAT_BRADY_DTM_END: NORMAL
MDC_IDC_STAT_BRADY_DTM_START: NORMAL
MDC_IDC_STAT_BRADY_RA_PERCENT_PACED: 31 %
MDC_IDC_STAT_BRADY_RV_PERCENT_PACED: 100 %
MDC_IDC_STAT_CRT_DTM_END: NORMAL
MDC_IDC_STAT_CRT_DTM_START: NORMAL
MDC_IDC_STAT_CRT_LV_PERCENT_PACED: 100 %
MDC_IDC_STAT_EPISODE_RECENT_COUNT: 0
MDC_IDC_STAT_EPISODE_RECENT_COUNT_DTM_END: NORMAL
MDC_IDC_STAT_EPISODE_RECENT_COUNT_DTM_START: NORMAL
MDC_IDC_STAT_EPISODE_TYPE: NORMAL
MDC_IDC_STAT_EPISODE_VENDOR_TYPE: NORMAL
MDC_IDC_STAT_EPISODE_VENDOR_TYPE: NORMAL

## 2024-06-07 PROCEDURE — 93294 REM INTERROG EVL PM/LDLS PM: CPT | Performed by: INTERNAL MEDICINE

## 2024-06-07 PROCEDURE — 93296 REM INTERROG EVL PM/IDS: CPT | Performed by: INTERNAL MEDICINE

## 2024-08-22 ENCOUNTER — TRANSFERRED RECORDS (OUTPATIENT)
Dept: HEALTH INFORMATION MANAGEMENT | Facility: CLINIC | Age: 84
End: 2024-08-22

## 2024-09-23 ENCOUNTER — ANCILLARY PROCEDURE (OUTPATIENT)
Dept: CARDIOLOGY | Facility: CLINIC | Age: 84
End: 2024-09-23
Attending: INTERNAL MEDICINE
Payer: MEDICARE

## 2024-09-23 DIAGNOSIS — I44.2 THIRD DEGREE AV BLOCK (H): ICD-10-CM

## 2024-09-23 DIAGNOSIS — Z95.0 STATUS POST BIVENTRICULAR PACEMAKER: ICD-10-CM

## 2024-09-23 DIAGNOSIS — I42.8 NON-ISCHEMIC CARDIOMYOPATHY (H): ICD-10-CM

## 2024-09-23 DIAGNOSIS — I49.5 SICK SINUS SYNDROME (H): ICD-10-CM

## 2024-09-24 ENCOUNTER — OFFICE VISIT (OUTPATIENT)
Dept: CARDIOLOGY | Facility: CLINIC | Age: 84
End: 2024-09-24
Payer: MEDICARE

## 2024-09-24 VITALS
BODY MASS INDEX: 19.94 KG/M2 | HEART RATE: 60 BPM | DIASTOLIC BLOOD PRESSURE: 73 MMHG | RESPIRATION RATE: 14 BRPM | SYSTOLIC BLOOD PRESSURE: 115 MMHG | WEIGHT: 109 LBS

## 2024-09-24 DIAGNOSIS — I44.2 COMPLETE ATRIOVENTRICULAR BLOCK (H): ICD-10-CM

## 2024-09-24 DIAGNOSIS — R60.9 2+ PITTING EDEMA: ICD-10-CM

## 2024-09-24 DIAGNOSIS — I42.8 NON-ISCHEMIC CARDIOMYOPATHY (H): Primary | ICD-10-CM

## 2024-09-24 DIAGNOSIS — I25.10 CORONARY ARTERY DISEASE INVOLVING NATIVE CORONARY ARTERY OF NATIVE HEART WITHOUT ANGINA PECTORIS: ICD-10-CM

## 2024-09-24 DIAGNOSIS — I50.30 HEART FAILURE WITH PRESERVED EJECTION FRACTION, NYHA CLASS I (H): ICD-10-CM

## 2024-09-24 LAB
ALBUMIN SERPL BCG-MCNC: 4.1 G/DL (ref 3.5–5.2)
ALP SERPL-CCNC: 130 U/L (ref 40–150)
ALT SERPL W P-5'-P-CCNC: 54 U/L (ref 0–50)
ANION GAP SERPL CALCULATED.3IONS-SCNC: 8 MMOL/L (ref 7–15)
AST SERPL W P-5'-P-CCNC: 52 U/L (ref 0–45)
BILIRUB SERPL-MCNC: 0.3 MG/DL
BUN SERPL-MCNC: 21.3 MG/DL (ref 8–23)
CALCIUM SERPL-MCNC: 9.3 MG/DL (ref 8.8–10.4)
CHLORIDE SERPL-SCNC: 105 MMOL/L (ref 98–107)
CREAT SERPL-MCNC: 0.85 MG/DL (ref 0.51–0.95)
D DIMER PPP FEU-MCNC: 0.76 UG/ML FEU (ref 0–0.5)
EGFRCR SERPLBLD CKD-EPI 2021: 67 ML/MIN/1.73M2
ERYTHROCYTE [DISTWIDTH] IN BLOOD BY AUTOMATED COUNT: 13.7 % (ref 10–15)
GLUCOSE SERPL-MCNC: 94 MG/DL (ref 70–99)
HCO3 SERPL-SCNC: 27 MMOL/L (ref 22–29)
HCT VFR BLD AUTO: 38.3 % (ref 35–47)
HGB BLD-MCNC: 12.4 G/DL (ref 11.7–15.7)
MCH RBC QN AUTO: 30.3 PG (ref 26.5–33)
MCHC RBC AUTO-ENTMCNC: 32.4 G/DL (ref 31.5–36.5)
MCV RBC AUTO: 94 FL (ref 78–100)
NT-PROBNP SERPL-MCNC: 152 PG/ML (ref 0–1800)
PLATELET # BLD AUTO: 147 10E3/UL (ref 150–450)
POTASSIUM SERPL-SCNC: 5.1 MMOL/L (ref 3.4–5.3)
PROT SERPL-MCNC: 7.2 G/DL (ref 6.4–8.3)
RBC # BLD AUTO: 4.09 10E6/UL (ref 3.8–5.2)
SODIUM SERPL-SCNC: 140 MMOL/L (ref 135–145)
TSH SERPL DL<=0.005 MIU/L-ACNC: 1.37 UIU/ML (ref 0.3–4.2)
WBC # BLD AUTO: 5.7 10E3/UL (ref 4–11)

## 2024-09-24 PROCEDURE — 85379 FIBRIN DEGRADATION QUANT: CPT | Performed by: INTERNAL MEDICINE

## 2024-09-24 PROCEDURE — 99215 OFFICE O/P EST HI 40 MIN: CPT | Performed by: INTERNAL MEDICINE

## 2024-09-24 PROCEDURE — G2211 COMPLEX E/M VISIT ADD ON: HCPCS | Performed by: INTERNAL MEDICINE

## 2024-09-24 PROCEDURE — 84443 ASSAY THYROID STIM HORMONE: CPT | Performed by: INTERNAL MEDICINE

## 2024-09-24 PROCEDURE — 36415 COLL VENOUS BLD VENIPUNCTURE: CPT | Performed by: INTERNAL MEDICINE

## 2024-09-24 PROCEDURE — 83880 ASSAY OF NATRIURETIC PEPTIDE: CPT | Performed by: INTERNAL MEDICINE

## 2024-09-24 PROCEDURE — 85027 COMPLETE CBC AUTOMATED: CPT | Performed by: INTERNAL MEDICINE

## 2024-09-24 PROCEDURE — 80053 COMPREHEN METABOLIC PANEL: CPT | Performed by: INTERNAL MEDICINE

## 2024-09-24 NOTE — LETTER
9/24/2024    Brittany Man MD  [address] [place] [address]    RE: Helena Brown       Dear Colleague,     I had the pleasure of seeing Helena Brown in the [facility].    HEART CARE ENCOUNTER CONSULTATON NOTE      [facility]  [phone]      Assessment/Recommendations   Assessment:   1. 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   CRT-P device (check reviewed from 9/23/2024)  2.  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  HFimEF with upgrade to BiV.  LVEF: 60% (prior 35-40%).    3.  History of pulmonary embolism  4.  Hyperlipidemia  5.  Occasional palpitations  6.  Nonobstructive coronary artery disease  7.  Mild bilateral lower extremity edema, new     Plan:  1.  Continue with routine device interrogations.    2.  Check complete metabolic panel, BNP, TSH , CBC given new onset lower extremity edema and fatigue.  3.  Check D-dimer given history of pulmonary embolism and lower extremity edema.  Would recommend bilateral Doppler ultrasound D-dimers positive.  4.  Complete echocardiogram given new onset lower extremity edema and history nonischemic cardiomyopathy  5.  Continue atorvastatin for hyperlipidemia  6.  Continue spironolactone for heart failure with improved ejection fraction           History of Present Illness/Subjective    HPI: Helena Brown is a 84 year old female with history of nonischemic cardiomyopathy, chronic heart failure with improved ejection fraction now with heart failure with preserved ejection fraction, history of pulm medicine, occasional palpitations presents to cardiology clinic in follow-up.    Recent device was interrogated which demonstrated normal device function.  No significant Triklo or atrial arrhythmias.  Device interrogation performed yesterday.  Reviewed with patient in detail    Patient  has been noticing some new onset of lower extremity edema for the past year.  She also notes some mild dyspnea with exertion and fatigue.  We will check workup as stated above.    Denies any active chest pain.  No syncopal episodes.  No rotation of her device site    Device Check: 9/23/2024  Encounter Type: Patient seen in clinic for annual device evaluation and iterative programming  Device: [employer] Valitude CRT-P   Pacing %/Programmed: AP 35%, %, and % in DDDR  bpm mode   Lead(s): RA, RV, & LV with stable measurements & trends   Battery longevity: Estimated at 5.5 years remaining   Presenting rhythm: AP Bi- at 64 bpm   Underlying rhythm: SR/SB at 59 bpm   Heart rates:  bpm but primarily 60-90 bpm per histograms with adequate variability & no complaints of activity intolerance   Atrial High rates: None detected   Anticoagulant: Not on OAC, takes ASA 81 mg daily   Ventricular High rates: None detected   Comments: Normal device function.  LV amplitude was changed from 2.6V to 2.5V.    Plan: Next scheduled remote transmission on December 27th, 2024- patient is aware & reminder letter was given to her. Pt will see Dr Tom for OV tomorrow, 9/24/2024.  Giovanna Garcia RN           Physical Examination  Review of Systems   Vitals: /73 (BP Location: Right arm, Patient Position: Sitting, Cuff Size: Adult Regular)   Pulse 60   Resp 14   Wt 49.4 kg (109 lb)   BMI 19.94 kg/m    BMI= Body mass index is 19.94 kg/m .  Wt Readings from Last 3 Encounters:   09/24/24 49.4 kg (109 lb)   09/19/23 50 kg (110 lb 4.8 oz)   08/05/22 47.3 kg (104 lb 4.8 oz)           ENT/Mouth: membranes moist, no oral lesions or bleeding gums.      EYES:  no scleral icterus, normal conjunctivae       Chest/Lungs:   lungs are clear to auscultation, no rales or wheezing,   Cardiovascular:   Regular. Normal first and second heart sounds with no murmurs, rubs, or gallops; the carotid, radial and posterior  tibial pulses are intact, Jugular venous pressure normal, +2 pitting edema bilaterally    Abdomen:  no tenderness; bowel sounds are present   Extremities: no cyanosis or clubbing   Skin: no xanthelasma, warm.    Neurologic: normal  bilateral, no tremors     Psychiatric: alert and oriented x3, calm        Please refer above for cardiac ROS details.        Medical History  Surgical History Family History Social History   Past Medical History:   Diagnosis Date     Anxiety      Arthritis      Asthma      Colitis     Collagenous     Complete heart block (H)      Hypertriglyceridemia      Macular degeneration      Osteopenia      Osteoporosis      Pacemaker      Pulmonary embolism (H)      Restless legs      Skin cancer, basal cell      Past Surgical History:   Procedure Laterality Date     APPENDECTOMY       ARTHROSCOPY SHOULDER ROTATOR CUFF REPAIR       BREAST EXCISIONAL BIOPSY Right [id number]     BREAST SURGERY      biopsy     BUNIONECTOMY       EP PACEMAKER INSERT N/A 3/29/2019    Aaron Babcock MD;  Location: [facility];  Service: Cardiology     IR EXTREMITY VENOGRAM LEFT  7/25/2019     IR EXTREMITY VENOGRAM LEFT  7/25/2019     TONSILLECTOMY       [facility] TOTAL KNEE ARTHROPLASTY Left 8/30/2018    Procedure:  LEFT MINIMALLY INVASIVE TOTAL KNEE ARTHROPLASTY;  Surgeon: Segun Banks MD;  Location: [facility];  Service: Orthopedics     [facility] TOTAL KNEE ARTHROPLASTY Right 11/8/2018    Procedure: RIGHT MINIMALLY INVASIVE TOTAL KNEE ARTHROPLASTY;  Surgeon: Segun Banks MD;  Location: [facility] OR;  Service: Orthopedics     Family History   Problem Relation Age of Onset     Breast Cancer Cousin      Heart Disease Mother      Alzheimer Disease Father      Clotting Disorder No family hx of         Social History     Socioeconomic History     Marital status:      Spouse name: Not on file     Number of children: Not on file     Years of education: Not on file     Highest education level:  Not on file   Occupational History     Not on file   Tobacco Use     Smoking status: Former     Current packs/day: 0.00     Average packs/day: 0.5 packs/day for 2.0 years (1.0 ttl pk-yrs)     Types: Cigarettes     Start date: 1958     Quit date: 1960     Years since quittin.9     Smokeless tobacco: Never     Tobacco comments:     hasn't smoke in years   Vaping Use     Vaping status: Never Used   Substance and Sexual Activity     Alcohol use: Yes     Alcohol/week: 7.0 standard drinks of alcohol     Comment: Alcoholic Drinks/day: per week     Drug use: No     Sexual activity: Not on file   Other Topics Concern     Not on file   Social History Narrative     Not on file     Social Determinants of Health     Financial Resource Strain: Not on file   Food Insecurity: Not on file   Transportation Needs: Not on file   Physical Activity: Not on file   Stress: Not on file   Social Connections: Not on file   Interpersonal Safety: Not on file   Housing Stability: Not on file           Medications  Allergies   Current Outpatient Medications   Medication Sig Dispense Refill     acetaminophen (TYLENOL) 325 MG tablet Take 1-2 tablets (325-650 mg) by mouth every 6 hours as needed for mild pain 60 tablet 0     aspirin 81 MG EC tablet Take 81 mg by mouth daily       atorvastatin (LIPITOR) 20 MG tablet Take 20 mg by mouth At Bedtime       cyanocobalamin (VITAMIN B-12) 100 MCG tablet Take 100 mcg by mouth daily       gabapentin (NEURONTIN) 300 MG capsule Take 300 mg by mouth At Bedtime       ketoconazole (NIZORAL) 2 % external cream Apply topically as needed       magnesium 250 MG tablet Take 1 tablet by mouth daily       melatonin 5 MG CAPS Take 5 mg by mouth every evening as needed       spironolactone (ALDACTONE) 25 MG tablet Take 25 mg by mouth daily       calcium citrate (CITRACAL) 950 (200 Ca) MG tablet Take 1 tablet by mouth daily (Patient not taking: Reported on 2024)       vitamin D3 (CHOLECALCIFEROL) 50 mcg  "(2000 units) tablet Take 1 tablet (50 mcg) by mouth daily (Patient not taking: Reported on 9/24/2024) 30 tablet 0       Allergies   Allergen Reactions     Nsaids Other (See Comments)     Patient has history of microcolitis.  GI aggravation     Sulfa Antibiotics      Made her eyes stingy- it was eyedrops  Eye drops caused burning          Lab Results    Chemistry/lipid CBC Cardiac Enzymes/BNP/TSH/INR   Recent Labs   Lab Test 05/20/24  0911   CHOL 122   HDL 64   LDL 49   TRIG 43     Recent Labs   Lab Test 05/20/24  0911 09/11/23  1142 07/15/21  1006   LDL 49 58 70     Recent Labs   Lab Test 05/20/24  0911      POTASSIUM 4.9   CHLORIDE 105   CO2 23   GLC 87   BUN 25.2*   CR 0.83   GFRESTIMATED 70   MARYANN 9.0     Recent Labs   Lab Test 05/20/24  0911 08/23/23  1500 07/12/22  0815   CR 0.83 1.09* 0.63     No results for input(s): \"A1C\" in the last 77494 hours.       Recent Labs   Lab Test 07/12/22  0815   WBC 6.4   HGB 11.4*   HCT 35.4   MCV 94        Recent Labs   Lab Test 07/12/22  0815 07/07/22  0557 07/06/22  0830   HGB 11.4* 11.1* 11.5*    Recent Labs   Lab Test 04/26/19  1655 03/28/19  1546   TROPONINI 0.01 <0.01     Recent Labs   Lab Test 06/04/19  0946 04/26/19  1655 04/23/19  1051    84 100     Recent Labs   Lab Test 03/28/19  1956   TSH 1.44     Recent Labs   Lab Test 07/07/22  0557 08/13/19  0625 08/12/19  0646   INR 1.01 1.97* 1.86*        Reese Tom DO  Cardiologist     51 minutes spent by me on the date of the encounter doing chart review, history and exam, documentation and further activities per the note    The longitudinal plan of care for the diagnosis(es)/condition(s) as documented were addressed during this visit. Due to the added complexity in care, I will continue to support Helena in the subsequent management and with ongoing continuity of care.                       Thank you for allowing me to participate in the care of your patient.      Sincerely,     Reese HU" DO Vaishali     [facility]  cc:   Reese Tom DO  [address] [place] [address]

## 2024-09-24 NOTE — PATIENT INSTRUCTIONS
Please contact direct nurses line Monday through Friday 8 AM to 5 PM @ [phone]    After-hours contact cardiology office at [phone].    Plan:    Labs   Echocardiogram

## 2024-09-24 NOTE — PROGRESS NOTES
HEART CARE ENCOUNTER CONSULTATON NOTE      [facility]  [phone]      Assessment/Recommendations   Assessment:   1. 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   CRT-P device (check reviewed from 9/23/2024)  2.  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  HFimEF with upgrade to BiV.  LVEF: 60% (prior 35-40%).    3.  History of pulmonary embolism  4.  Hyperlipidemia  5.  Occasional palpitations  6.  Nonobstructive coronary artery disease  7.  Mild bilateral lower extremity edema, new     Plan:  1.  Continue with routine device interrogations.    2.  Check complete metabolic panel, BNP, TSH , CBC given new onset lower extremity edema and fatigue.  3.  Check D-dimer given history of pulmonary embolism and lower extremity edema.  Would recommend bilateral Doppler ultrasound D-dimers positive.  4.  Complete echocardiogram given new onset lower extremity edema and history nonischemic cardiomyopathy  5.  Continue atorvastatin for hyperlipidemia  6.  Continue spironolactone for heart failure with improved ejection fraction           History of Present Illness/Subjective    HPI: Helena Brown is a 84 year old female with history of nonischemic cardiomyopathy, chronic heart failure with improved ejection fraction now with heart failure with preserved ejection fraction, history of pulm medicine, occasional palpitations presents to cardiology clinic in follow-up.    Recent device was interrogated which demonstrated normal device function.  No significant Triklo or atrial arrhythmias.  Device interrogation performed yesterday.  Reviewed with patient in detail    Patient has been noticing some new onset of lower extremity edema for the past year.  She also notes some mild dyspnea with exertion and fatigue.  We will check workup as stated above.    Denies any active  chest pain.  No syncopal episodes.  No rotation of her device site    Device Check: 9/23/2024  Encounter Type: Patient seen in clinic for annual device evaluation and iterative programming  Device: [employer] Valitude CRT-P   Pacing %/Programmed: AP 35%, %, and % in DDDR  bpm mode   Lead(s): RA, RV, & LV with stable measurements & trends   Battery longevity: Estimated at 5.5 years remaining   Presenting rhythm: AP Bi- at 64 bpm   Underlying rhythm: SR/SB at 59 bpm   Heart rates:  bpm but primarily 60-90 bpm per histograms with adequate variability & no complaints of activity intolerance   Atrial High rates: None detected   Anticoagulant: Not on OAC, takes ASA 81 mg daily   Ventricular High rates: None detected   Comments: Normal device function.  LV amplitude was changed from 2.6V to 2.5V.    Plan: Next scheduled remote transmission on December 27th, 2024- patient is aware & reminder letter was given to her. Pt will see Dr Tom for OV tomorrow, 9/24/2024.  Giovanna Garcia RN           Physical Examination  Review of Systems   Vitals: /73 (BP Location: Right arm, Patient Position: Sitting, Cuff Size: Adult Regular)   Pulse 60   Resp 14   Wt 49.4 kg (109 lb)   BMI 19.94 kg/m    BMI= Body mass index is 19.94 kg/m .  Wt Readings from Last 3 Encounters:   09/24/24 49.4 kg (109 lb)   09/19/23 50 kg (110 lb 4.8 oz)   08/05/22 47.3 kg (104 lb 4.8 oz)           ENT/Mouth: membranes moist, no oral lesions or bleeding gums.      EYES:  no scleral icterus, normal conjunctivae       Chest/Lungs:   lungs are clear to auscultation, no rales or wheezing,   Cardiovascular:   Regular. Normal first and second heart sounds with no murmurs, rubs, or gallops; the carotid, radial and posterior tibial pulses are intact, Jugular venous pressure normal, +2 pitting edema bilaterally    Abdomen:  no tenderness; bowel sounds are present   Extremities: no cyanosis or clubbing   Skin: no xanthelasma,  warm.    Neurologic: normal  bilateral, no tremors     Psychiatric: alert and oriented x3, calm        Please refer above for cardiac ROS details.        Medical History  Surgical History Family History Social History   Past Medical History:   Diagnosis Date    Anxiety     Arthritis     Asthma     Colitis     Collagenous    Complete heart block (H)     Hypertriglyceridemia     Macular degeneration     Osteopenia     Osteoporosis     Pacemaker     Pulmonary embolism (H)     Restless legs     Skin cancer, basal cell      Past Surgical History:   Procedure Laterality Date    APPENDECTOMY      ARTHROSCOPY SHOULDER ROTATOR CUFF REPAIR      BREAST EXCISIONAL BIOPSY Right [id number]    BREAST SURGERY      biopsy    BUNIONECTOMY      EP PACEMAKER INSERT N/A 3/29/2019    Aaron Babcock MD;  Location: [facility];  Service: Cardiology    IR EXTREMITY VENOGRAM LEFT  7/25/2019    IR EXTREMITY VENOGRAM LEFT  7/25/2019    TONSILLECTOMY      [facility] TOTAL KNEE ARTHROPLASTY Left 8/30/2018    Procedure:  LEFT MINIMALLY INVASIVE TOTAL KNEE ARTHROPLASTY;  Surgeon: Sgeun Banks MD;  Location: [facility];  Service: Orthopedics    [facility] TOTAL KNEE ARTHROPLASTY Right 11/8/2018    Procedure: RIGHT MINIMALLY INVASIVE TOTAL KNEE ARTHROPLASTY;  Surgeon: Segun Banks MD;  Location: [facility];  Service: Orthopedics     Family History   Problem Relation Age of Onset    Breast Cancer Cousin     Heart Disease Mother     Alzheimer Disease Father     Clotting Disorder No family hx of         Social History     Socioeconomic History    Marital status:      Spouse name: Not on file    Number of children: Not on file    Years of education: Not on file    Highest education level: Not on file   Occupational History    Not on file   Tobacco Use    Smoking status: Former     Current packs/day: 0.00     Average packs/day: 0.5 packs/day for 2.0 years (1.0 ttl pk-yrs)     Types: Cigarettes     Start date: 11/8/1958      Quit date: 1960     Years since quittin.9    Smokeless tobacco: Never    Tobacco comments:     hasn't smoke in years   Vaping Use    Vaping status: Never Used   Substance and Sexual Activity    Alcohol use: Yes     Alcohol/week: 7.0 standard drinks of alcohol     Comment: Alcoholic Drinks/day: per week    Drug use: No    Sexual activity: Not on file   Other Topics Concern    Not on file   Social History Narrative    Not on file     Social Determinants of Health     Financial Resource Strain: Not on file   Food Insecurity: Not on file   Transportation Needs: Not on file   Physical Activity: Not on file   Stress: Not on file   Social Connections: Not on file   Interpersonal Safety: Not on file   Housing Stability: Not on file           Medications  Allergies   Current Outpatient Medications   Medication Sig Dispense Refill    acetaminophen (TYLENOL) 325 MG tablet Take 1-2 tablets (325-650 mg) by mouth every 6 hours as needed for mild pain 60 tablet 0    aspirin 81 MG EC tablet Take 81 mg by mouth daily      atorvastatin (LIPITOR) 20 MG tablet Take 20 mg by mouth At Bedtime      cyanocobalamin (VITAMIN B-12) 100 MCG tablet Take 100 mcg by mouth daily      gabapentin (NEURONTIN) 300 MG capsule Take 300 mg by mouth At Bedtime      ketoconazole (NIZORAL) 2 % external cream Apply topically as needed      magnesium 250 MG tablet Take 1 tablet by mouth daily      melatonin 5 MG CAPS Take 5 mg by mouth every evening as needed      spironolactone (ALDACTONE) 25 MG tablet Take 25 mg by mouth daily      calcium citrate (CITRACAL) 950 (200 Ca) MG tablet Take 1 tablet by mouth daily (Patient not taking: Reported on 2024)      vitamin D3 (CHOLECALCIFEROL) 50 mcg (2000 units) tablet Take 1 tablet (50 mcg) by mouth daily (Patient not taking: Reported on 2024) 30 tablet 0       Allergies   Allergen Reactions    Nsaids Other (See Comments)     Patient has history of microcolitis.  GI aggravation    Sulfa Antibiotics  "     Made her eyes stingy- it was eyedrops  Eye drops caused burning          Lab Results    Chemistry/lipid CBC Cardiac Enzymes/BNP/TSH/INR   Recent Labs   Lab Test 05/20/24  0911   CHOL 122   HDL 64   LDL 49   TRIG 43     Recent Labs   Lab Test 05/20/24  0911 09/11/23  1142 07/15/21  1006   LDL 49 58 70     Recent Labs   Lab Test 05/20/24  0911      POTASSIUM 4.9   CHLORIDE 105   CO2 23   GLC 87   BUN 25.2*   CR 0.83   GFRESTIMATED 70   MARYANN 9.0     Recent Labs   Lab Test 05/20/24  0911 08/23/23  1500 07/12/22  0815   CR 0.83 1.09* 0.63     No results for input(s): \"A1C\" in the last 11420 hours.       Recent Labs   Lab Test 07/12/22  0815   WBC 6.4   HGB 11.4*   HCT 35.4   MCV 94        Recent Labs   Lab Test 07/12/22  0815 07/07/22  0557 07/06/22  0830   HGB 11.4* 11.1* 11.5*    Recent Labs   Lab Test 04/26/19  1655 03/28/19  1546   TROPONINI 0.01 <0.01     Recent Labs   Lab Test 06/04/19  0946 04/26/19  1655 04/23/19  1051    84 100     Recent Labs   Lab Test 03/28/19  1956   TSH 1.44     Recent Labs   Lab Test 07/07/22  0557 08/13/19  0625 08/12/19  0646   INR 1.01 1.97* 1.86*        Reese Tom DO  Cardiologist     51 minutes spent by me on the date of the encounter doing chart review, history and exam, documentation and further activities per the note    The longitudinal plan of care for the diagnosis(es)/condition(s) as documented were addressed during this visit. Due to the added complexity in care, I will continue to support Helena in the subsequent management and with ongoing continuity of care.                     "

## 2024-09-25 DIAGNOSIS — R60.0 LOWER EXTREMITY EDEMA: Primary | ICD-10-CM

## 2024-09-25 DIAGNOSIS — R79.89 ELEVATED D-DIMER: ICD-10-CM

## 2024-09-25 DIAGNOSIS — L53.9 LEG ERYTHEMA: ICD-10-CM

## 2024-09-27 ENCOUNTER — HOSPITAL ENCOUNTER (OUTPATIENT)
Dept: ULTRASOUND IMAGING | Facility: CLINIC | Age: 84
Discharge: HOME | End: 2024-09-27
Attending: INTERNAL MEDICINE | Admitting: INTERNAL MEDICINE
Payer: MEDICARE

## 2024-09-27 DIAGNOSIS — L53.9 LEG ERYTHEMA: ICD-10-CM

## 2024-09-27 DIAGNOSIS — R60.0 LOWER EXTREMITY EDEMA: ICD-10-CM

## 2024-09-27 DIAGNOSIS — R79.89 ELEVATED D-DIMER: ICD-10-CM

## 2024-09-27 PROCEDURE — 93970 EXTREMITY STUDY: CPT

## 2024-09-29 LAB
MDC_IDC_LEAD_CONNECTION_STATUS: NORMAL
MDC_IDC_LEAD_IMPLANT_DT: NORMAL
MDC_IDC_LEAD_LOCATION: NORMAL
MDC_IDC_LEAD_LOCATION_DETAIL_1: NORMAL
MDC_IDC_LEAD_MFG: NORMAL
MDC_IDC_LEAD_MODEL: NORMAL
MDC_IDC_LEAD_POLARITY_TYPE: NORMAL
MDC_IDC_LEAD_SERIAL: NORMAL
MDC_IDC_LEAD_SPECIAL_FUNCTION: NORMAL
MDC_IDC_MSMT_BATTERY_DTM: NORMAL
MDC_IDC_MSMT_BATTERY_REMAINING_LONGEVITY: 66 MO
MDC_IDC_MSMT_BATTERY_REMAINING_PERCENTAGE: 77 %
MDC_IDC_MSMT_BATTERY_STATUS: NORMAL
MDC_IDC_MSMT_LEADCHNL_LV_IMPEDANCE_VALUE: 677 OHM
MDC_IDC_MSMT_LEADCHNL_LV_PACING_THRESHOLD_AMPLITUDE: 1.5 V
MDC_IDC_MSMT_LEADCHNL_LV_PACING_THRESHOLD_PULSEWIDTH: 0.9 MS
MDC_IDC_MSMT_LEADCHNL_LV_SENSING_INTR_AMPL: 25 MV
MDC_IDC_MSMT_LEADCHNL_RA_IMPEDANCE_VALUE: 582 OHM
MDC_IDC_MSMT_LEADCHNL_RA_PACING_THRESHOLD_AMPLITUDE: 0.7 V
MDC_IDC_MSMT_LEADCHNL_RA_PACING_THRESHOLD_PULSEWIDTH: 0.4 MS
MDC_IDC_MSMT_LEADCHNL_RA_SENSING_INTR_AMPL: 2.5 MV
MDC_IDC_MSMT_LEADCHNL_RV_IMPEDANCE_VALUE: 782 OHM
MDC_IDC_MSMT_LEADCHNL_RV_PACING_THRESHOLD_AMPLITUDE: 0.7 V
MDC_IDC_MSMT_LEADCHNL_RV_PACING_THRESHOLD_PULSEWIDTH: 0.4 MS
MDC_IDC_MSMT_LEADCHNL_RV_SENSING_INTR_AMPL: 9.9 MV
MDC_IDC_PG_IMPLANT_DTM: NORMAL
MDC_IDC_PG_MFG: NORMAL
MDC_IDC_PG_MODEL: NORMAL
MDC_IDC_PG_SERIAL: NORMAL
MDC_IDC_PG_TYPE: NORMAL
MDC_IDC_SESS_CLINIC_NAME: NORMAL
MDC_IDC_SESS_DTM: NORMAL
MDC_IDC_SESS_TYPE: NORMAL
MDC_IDC_SET_BRADY_AT_MODE_SWITCH_MODE: NORMAL
MDC_IDC_SET_BRADY_AT_MODE_SWITCH_RATE: 170 {BEATS}/MIN
MDC_IDC_SET_BRADY_LOWRATE: 60 {BEATS}/MIN
MDC_IDC_SET_BRADY_MAX_SENSOR_RATE: 130 {BEATS}/MIN
MDC_IDC_SET_BRADY_MAX_TRACKING_RATE: 130 {BEATS}/MIN
MDC_IDC_SET_BRADY_MODE: NORMAL
MDC_IDC_SET_BRADY_PAV_DELAY_HIGH: 110 MS
MDC_IDC_SET_BRADY_PAV_DELAY_LOW: 130 MS
MDC_IDC_SET_BRADY_SAV_DELAY_HIGH: 85 MS
MDC_IDC_SET_BRADY_SAV_DELAY_LOW: 100 MS
MDC_IDC_SET_CRT_LVRV_DELAY: 0 MS
MDC_IDC_SET_CRT_PACED_CHAMBERS: NORMAL
MDC_IDC_SET_LEADCHNL_LV_PACING_AMPLITUDE: 2.5 V
MDC_IDC_SET_LEADCHNL_LV_PACING_ANODE_ELECTRODE_1: NORMAL
MDC_IDC_SET_LEADCHNL_LV_PACING_ANODE_LOCATION_1: NORMAL
MDC_IDC_SET_LEADCHNL_LV_PACING_CATHODE_ELECTRODE_1: NORMAL
MDC_IDC_SET_LEADCHNL_LV_PACING_CATHODE_LOCATION_1: NORMAL
MDC_IDC_SET_LEADCHNL_LV_PACING_PULSEWIDTH: 0.9 MS
MDC_IDC_SET_LEADCHNL_LV_SENSING_ADAPTATION_MODE: NORMAL
MDC_IDC_SET_LEADCHNL_LV_SENSING_ANODE_ELECTRODE_1: NORMAL
MDC_IDC_SET_LEADCHNL_LV_SENSING_ANODE_LOCATION_1: NORMAL
MDC_IDC_SET_LEADCHNL_LV_SENSING_CATHODE_ELECTRODE_1: NORMAL
MDC_IDC_SET_LEADCHNL_LV_SENSING_CATHODE_LOCATION_1: NORMAL
MDC_IDC_SET_LEADCHNL_LV_SENSING_SENSITIVITY: 1.5 MV
MDC_IDC_SET_LEADCHNL_RA_PACING_AMPLITUDE: 1.5 V
MDC_IDC_SET_LEADCHNL_RA_PACING_CAPTURE_MODE: NORMAL
MDC_IDC_SET_LEADCHNL_RA_PACING_POLARITY: NORMAL
MDC_IDC_SET_LEADCHNL_RA_PACING_PULSEWIDTH: 0.4 MS
MDC_IDC_SET_LEADCHNL_RA_SENSING_ADAPTATION_MODE: NORMAL
MDC_IDC_SET_LEADCHNL_RA_SENSING_POLARITY: NORMAL
MDC_IDC_SET_LEADCHNL_RA_SENSING_SENSITIVITY: 0.25 MV
MDC_IDC_SET_LEADCHNL_RV_PACING_AMPLITUDE: NORMAL
MDC_IDC_SET_LEADCHNL_RV_PACING_CAPTURE_MODE: NORMAL
MDC_IDC_SET_LEADCHNL_RV_PACING_POLARITY: NORMAL
MDC_IDC_SET_LEADCHNL_RV_PACING_PULSEWIDTH: 0.4 MS
MDC_IDC_SET_LEADCHNL_RV_SENSING_ADAPTATION_MODE: NORMAL
MDC_IDC_SET_LEADCHNL_RV_SENSING_POLARITY: NORMAL
MDC_IDC_SET_LEADCHNL_RV_SENSING_SENSITIVITY: 1.5 MV
MDC_IDC_SET_ZONE_DETECTION_INTERVAL: 375 MS
MDC_IDC_SET_ZONE_STATUS: NORMAL
MDC_IDC_SET_ZONE_TYPE: NORMAL
MDC_IDC_SET_ZONE_VENDOR_TYPE: NORMAL
MDC_IDC_STAT_AT_BURDEN_PERCENT: 0 %
MDC_IDC_STAT_AT_DTM_END: NORMAL
MDC_IDC_STAT_AT_DTM_START: NORMAL
MDC_IDC_STAT_AT_MODE_SW_COUNT: 0
MDC_IDC_STAT_BRADY_DTM_END: NORMAL
MDC_IDC_STAT_BRADY_DTM_START: NORMAL
MDC_IDC_STAT_BRADY_RA_PERCENT_PACED: 35 %
MDC_IDC_STAT_BRADY_RV_PERCENT_PACED: 100 %
MDC_IDC_STAT_CRT_DTM_END: NORMAL
MDC_IDC_STAT_CRT_DTM_START: NORMAL
MDC_IDC_STAT_CRT_LV_PERCENT_PACED: 100 %
MDC_IDC_STAT_EPISODE_RECENT_COUNT: 0
MDC_IDC_STAT_EPISODE_RECENT_COUNT_DTM_END: NORMAL
MDC_IDC_STAT_EPISODE_RECENT_COUNT_DTM_START: NORMAL
MDC_IDC_STAT_EPISODE_TYPE: NORMAL
MDC_IDC_STAT_EPISODE_VENDOR_TYPE: NORMAL
MDC_IDC_STAT_EPISODE_VENDOR_TYPE: NORMAL

## 2024-09-29 PROCEDURE — 93281 PM DEVICE PROGR EVAL MULTI: CPT | Performed by: INTERNAL MEDICINE

## 2024-10-02 ENCOUNTER — HOSPITAL ENCOUNTER (OUTPATIENT)
Dept: CARDIOLOGY | Facility: CLINIC | Age: 84
Discharge: HOME | End: 2024-10-02
Attending: INTERNAL MEDICINE | Admitting: INTERNAL MEDICINE
Payer: MEDICARE

## 2024-10-02 DIAGNOSIS — I50.30 HEART FAILURE WITH PRESERVED EJECTION FRACTION, NYHA CLASS I (H): ICD-10-CM

## 2024-10-02 DIAGNOSIS — I25.10 CORONARY ARTERY DISEASE INVOLVING NATIVE CORONARY ARTERY OF NATIVE HEART WITHOUT ANGINA PECTORIS: ICD-10-CM

## 2024-10-02 DIAGNOSIS — I44.2 COMPLETE ATRIOVENTRICULAR BLOCK (H): ICD-10-CM

## 2024-10-02 DIAGNOSIS — I42.8 NON-ISCHEMIC CARDIOMYOPATHY (H): ICD-10-CM

## 2024-10-02 DIAGNOSIS — R60.9 2+ PITTING EDEMA: ICD-10-CM

## 2024-10-02 PROCEDURE — 93306 TTE W/DOPPLER COMPLETE: CPT

## 2024-10-02 PROCEDURE — 93306 TTE W/DOPPLER COMPLETE: CPT | Mod: 26 | Performed by: INTERNAL MEDICINE

## 2024-10-07 NOTE — RESULT ENCOUNTER NOTE
Please inform patient that her echocardiogram has been stable.  Normal left ventricular ejection fraction.  No evidence of fluid retention from her heart.  Will continue to monitor lower extremity edema.

## 2024-10-10 DIAGNOSIS — R60.0 LOWER EXTREMITY EDEMA: Primary | ICD-10-CM

## 2024-10-10 DIAGNOSIS — R60.9 2+ PITTING EDEMA: ICD-10-CM

## 2024-12-10 ENCOUNTER — HOSPITAL ENCOUNTER (OUTPATIENT)
Dept: MAMMOGRAPHY | Facility: CLINIC | Age: 84
Discharge: HOME | End: 2024-12-10
Attending: STUDENT IN AN ORGANIZED HEALTH CARE EDUCATION/TRAINING PROGRAM
Payer: MEDICARE

## 2024-12-10 DIAGNOSIS — Z12.31 VISIT FOR SCREENING MAMMOGRAM: ICD-10-CM

## 2024-12-10 PROCEDURE — 77063 BREAST TOMOSYNTHESIS BI: CPT

## 2024-12-10 PROCEDURE — 77067 SCR MAMMO BI INCL CAD: CPT

## 2024-12-30 ENCOUNTER — ANCILLARY PROCEDURE (OUTPATIENT)
Dept: CARDIOLOGY | Facility: CLINIC | Age: 84
End: 2024-12-30
Attending: INTERNAL MEDICINE
Payer: MEDICARE

## 2024-12-30 DIAGNOSIS — I44.2 THIRD DEGREE AV BLOCK (H): ICD-10-CM

## 2024-12-30 DIAGNOSIS — I42.8 NON-ISCHEMIC CARDIOMYOPATHY (H): ICD-10-CM

## 2024-12-30 DIAGNOSIS — Z95.0 STATUS POST BIVENTRICULAR PACEMAKER: ICD-10-CM

## 2024-12-30 DIAGNOSIS — I49.5 SICK SINUS SYNDROME (H): ICD-10-CM

## 2024-12-30 LAB
MDC_IDC_EPISODE_DTM: NORMAL
MDC_IDC_EPISODE_ID: NORMAL
MDC_IDC_EPISODE_TYPE: NORMAL
MDC_IDC_LEAD_CONNECTION_STATUS: NORMAL
MDC_IDC_LEAD_IMPLANT_DT: NORMAL
MDC_IDC_LEAD_LOCATION: NORMAL
MDC_IDC_LEAD_LOCATION_DETAIL_1: NORMAL
MDC_IDC_LEAD_MFG: NORMAL
MDC_IDC_LEAD_MODEL: NORMAL
MDC_IDC_LEAD_POLARITY_TYPE: NORMAL
MDC_IDC_LEAD_SERIAL: NORMAL
MDC_IDC_LEAD_SPECIAL_FUNCTION: NORMAL
MDC_IDC_MSMT_BATTERY_DTM: NORMAL
MDC_IDC_MSMT_BATTERY_REMAINING_LONGEVITY: 66 MO
MDC_IDC_MSMT_BATTERY_REMAINING_PERCENTAGE: 71 %
MDC_IDC_MSMT_BATTERY_STATUS: NORMAL
MDC_IDC_MSMT_LEADCHNL_LV_IMPEDANCE_VALUE: 653 OHM
MDC_IDC_MSMT_LEADCHNL_LV_PACING_THRESHOLD_AMPLITUDE: 1.5 V
MDC_IDC_MSMT_LEADCHNL_LV_PACING_THRESHOLD_PULSEWIDTH: 0.9 MS
MDC_IDC_MSMT_LEADCHNL_RA_IMPEDANCE_VALUE: 554 OHM
MDC_IDC_MSMT_LEADCHNL_RA_PACING_THRESHOLD_AMPLITUDE: 0.5 V
MDC_IDC_MSMT_LEADCHNL_RA_PACING_THRESHOLD_PULSEWIDTH: 0.4 MS
MDC_IDC_MSMT_LEADCHNL_RV_IMPEDANCE_VALUE: 804 OHM
MDC_IDC_MSMT_LEADCHNL_RV_PACING_THRESHOLD_AMPLITUDE: 0.9 V
MDC_IDC_MSMT_LEADCHNL_RV_PACING_THRESHOLD_PULSEWIDTH: 0.4 MS
MDC_IDC_PG_IMPLANT_DTM: NORMAL
MDC_IDC_PG_MFG: NORMAL
MDC_IDC_PG_MODEL: NORMAL
MDC_IDC_PG_SERIAL: NORMAL
MDC_IDC_PG_TYPE: NORMAL
MDC_IDC_SESS_CLINIC_NAME: NORMAL
MDC_IDC_SESS_DTM: NORMAL
MDC_IDC_SESS_TYPE: NORMAL
MDC_IDC_SET_BRADY_AT_MODE_SWITCH_MODE: NORMAL
MDC_IDC_SET_BRADY_AT_MODE_SWITCH_RATE: 170 {BEATS}/MIN
MDC_IDC_SET_BRADY_LOWRATE: 60 {BEATS}/MIN
MDC_IDC_SET_BRADY_MAX_SENSOR_RATE: 130 {BEATS}/MIN
MDC_IDC_SET_BRADY_MAX_TRACKING_RATE: 130 {BEATS}/MIN
MDC_IDC_SET_BRADY_MODE: NORMAL
MDC_IDC_SET_BRADY_PAV_DELAY_HIGH: 110 MS
MDC_IDC_SET_BRADY_PAV_DELAY_LOW: 130 MS
MDC_IDC_SET_BRADY_SAV_DELAY_HIGH: 85 MS
MDC_IDC_SET_BRADY_SAV_DELAY_LOW: 100 MS
MDC_IDC_SET_CRT_LVRV_DELAY: 0 MS
MDC_IDC_SET_CRT_PACED_CHAMBERS: NORMAL
MDC_IDC_SET_LEADCHNL_LV_PACING_AMPLITUDE: 2.5 V
MDC_IDC_SET_LEADCHNL_LV_PACING_ANODE_ELECTRODE_1: NORMAL
MDC_IDC_SET_LEADCHNL_LV_PACING_ANODE_LOCATION_1: NORMAL
MDC_IDC_SET_LEADCHNL_LV_PACING_CATHODE_ELECTRODE_1: NORMAL
MDC_IDC_SET_LEADCHNL_LV_PACING_CATHODE_LOCATION_1: NORMAL
MDC_IDC_SET_LEADCHNL_LV_PACING_PULSEWIDTH: 0.9 MS
MDC_IDC_SET_LEADCHNL_LV_SENSING_ADAPTATION_MODE: NORMAL
MDC_IDC_SET_LEADCHNL_LV_SENSING_ANODE_ELECTRODE_1: NORMAL
MDC_IDC_SET_LEADCHNL_LV_SENSING_ANODE_LOCATION_1: NORMAL
MDC_IDC_SET_LEADCHNL_LV_SENSING_CATHODE_ELECTRODE_1: NORMAL
MDC_IDC_SET_LEADCHNL_LV_SENSING_CATHODE_LOCATION_1: NORMAL
MDC_IDC_SET_LEADCHNL_LV_SENSING_SENSITIVITY: 1.5 MV
MDC_IDC_SET_LEADCHNL_RA_PACING_AMPLITUDE: 1.5 V
MDC_IDC_SET_LEADCHNL_RA_PACING_CAPTURE_MODE: NORMAL
MDC_IDC_SET_LEADCHNL_RA_PACING_POLARITY: NORMAL
MDC_IDC_SET_LEADCHNL_RA_PACING_PULSEWIDTH: 0.4 MS
MDC_IDC_SET_LEADCHNL_RA_SENSING_ADAPTATION_MODE: NORMAL
MDC_IDC_SET_LEADCHNL_RA_SENSING_POLARITY: NORMAL
MDC_IDC_SET_LEADCHNL_RA_SENSING_SENSITIVITY: 0.25 MV
MDC_IDC_SET_LEADCHNL_RV_PACING_AMPLITUDE: 2 V
MDC_IDC_SET_LEADCHNL_RV_PACING_CAPTURE_MODE: NORMAL
MDC_IDC_SET_LEADCHNL_RV_PACING_POLARITY: NORMAL
MDC_IDC_SET_LEADCHNL_RV_PACING_PULSEWIDTH: 0.4 MS
MDC_IDC_SET_LEADCHNL_RV_SENSING_ADAPTATION_MODE: NORMAL
MDC_IDC_SET_LEADCHNL_RV_SENSING_POLARITY: NORMAL
MDC_IDC_SET_LEADCHNL_RV_SENSING_SENSITIVITY: 1.5 MV
MDC_IDC_SET_ZONE_DETECTION_INTERVAL: 375 MS
MDC_IDC_SET_ZONE_STATUS: NORMAL
MDC_IDC_SET_ZONE_TYPE: NORMAL
MDC_IDC_SET_ZONE_VENDOR_TYPE: NORMAL
MDC_IDC_STAT_AT_BURDEN_PERCENT: 0 %
MDC_IDC_STAT_AT_DTM_END: NORMAL
MDC_IDC_STAT_AT_DTM_START: NORMAL
MDC_IDC_STAT_BRADY_DTM_END: NORMAL
MDC_IDC_STAT_BRADY_DTM_START: NORMAL
MDC_IDC_STAT_BRADY_RA_PERCENT_PACED: 40 %
MDC_IDC_STAT_BRADY_RV_PERCENT_PACED: 99 %
MDC_IDC_STAT_CRT_DTM_END: NORMAL
MDC_IDC_STAT_CRT_DTM_START: NORMAL
MDC_IDC_STAT_CRT_LV_PERCENT_PACED: 99 %
MDC_IDC_STAT_EPISODE_RECENT_COUNT: 0
MDC_IDC_STAT_EPISODE_RECENT_COUNT_DTM_END: NORMAL
MDC_IDC_STAT_EPISODE_RECENT_COUNT_DTM_START: NORMAL
MDC_IDC_STAT_EPISODE_TYPE: NORMAL
MDC_IDC_STAT_EPISODE_VENDOR_TYPE: NORMAL

## 2024-12-30 PROCEDURE — 93296 REM INTERROG EVL PM/IDS: CPT | Performed by: INTERNAL MEDICINE

## 2024-12-30 PROCEDURE — 93294 REM INTERROG EVL PM/LDLS PM: CPT | Performed by: INTERNAL MEDICINE

## 2025-02-25 ENCOUNTER — HOSPITAL ENCOUNTER (OUTPATIENT)
Dept: ULTRASOUND IMAGING | Facility: CLINIC | Age: 85
Discharge: HOME | End: 2025-02-25
Attending: STUDENT IN AN ORGANIZED HEALTH CARE EDUCATION/TRAINING PROGRAM
Payer: MEDICARE

## 2025-02-25 DIAGNOSIS — M79.89 SWELLING, LIMB: ICD-10-CM

## 2025-02-25 PROCEDURE — 93971 EXTREMITY STUDY: CPT | Mod: LT

## 2025-04-08 ENCOUNTER — ANCILLARY PROCEDURE (OUTPATIENT)
Dept: CARDIOLOGY | Facility: CLINIC | Age: 85
End: 2025-04-08
Attending: INTERNAL MEDICINE
Payer: MEDICARE

## 2025-04-08 DIAGNOSIS — I42.8 NON-ISCHEMIC CARDIOMYOPATHY (H): ICD-10-CM

## 2025-04-08 DIAGNOSIS — I49.5 SICK SINUS SYNDROME (H): ICD-10-CM

## 2025-04-08 DIAGNOSIS — I44.2 THIRD DEGREE AV BLOCK (H): ICD-10-CM

## 2025-04-08 DIAGNOSIS — Z95.0 STATUS POST BIVENTRICULAR PACEMAKER: ICD-10-CM

## 2025-04-08 LAB
MDC_IDC_EPISODE_DTM: NORMAL
MDC_IDC_EPISODE_ID: NORMAL
MDC_IDC_EPISODE_TYPE: NORMAL
MDC_IDC_LEAD_CONNECTION_STATUS: NORMAL
MDC_IDC_LEAD_IMPLANT_DT: NORMAL
MDC_IDC_LEAD_LOCATION: NORMAL
MDC_IDC_LEAD_LOCATION_DETAIL_1: NORMAL
MDC_IDC_LEAD_MFG: NORMAL
MDC_IDC_LEAD_MODEL: NORMAL
MDC_IDC_LEAD_POLARITY_TYPE: NORMAL
MDC_IDC_LEAD_SERIAL: NORMAL
MDC_IDC_LEAD_SPECIAL_FUNCTION: NORMAL
MDC_IDC_MSMT_BATTERY_DTM: NORMAL
MDC_IDC_MSMT_BATTERY_REMAINING_LONGEVITY: 60 MO
MDC_IDC_MSMT_BATTERY_REMAINING_PERCENTAGE: 65 %
MDC_IDC_MSMT_BATTERY_STATUS: NORMAL
MDC_IDC_MSMT_LEADCHNL_LV_IMPEDANCE_VALUE: 630 OHM
MDC_IDC_MSMT_LEADCHNL_RA_IMPEDANCE_VALUE: 561 OHM
MDC_IDC_MSMT_LEADCHNL_RA_PACING_THRESHOLD_AMPLITUDE: 0.5 V
MDC_IDC_MSMT_LEADCHNL_RA_PACING_THRESHOLD_PULSEWIDTH: 0.4 MS
MDC_IDC_MSMT_LEADCHNL_RV_IMPEDANCE_VALUE: 765 OHM
MDC_IDC_MSMT_LEADCHNL_RV_PACING_THRESHOLD_AMPLITUDE: 1 V
MDC_IDC_MSMT_LEADCHNL_RV_PACING_THRESHOLD_PULSEWIDTH: 0.4 MS
MDC_IDC_PG_IMPLANT_DTM: NORMAL
MDC_IDC_PG_MFG: NORMAL
MDC_IDC_PG_MODEL: NORMAL
MDC_IDC_PG_SERIAL: NORMAL
MDC_IDC_PG_TYPE: NORMAL
MDC_IDC_SESS_CLINIC_NAME: NORMAL
MDC_IDC_SESS_DTM: NORMAL
MDC_IDC_SESS_TYPE: NORMAL
MDC_IDC_SET_BRADY_AT_MODE_SWITCH_MODE: NORMAL
MDC_IDC_SET_BRADY_AT_MODE_SWITCH_RATE: 170 {BEATS}/MIN
MDC_IDC_SET_BRADY_LOWRATE: 60 {BEATS}/MIN
MDC_IDC_SET_BRADY_MAX_SENSOR_RATE: 130 {BEATS}/MIN
MDC_IDC_SET_BRADY_MAX_TRACKING_RATE: 130 {BEATS}/MIN
MDC_IDC_SET_BRADY_MODE: NORMAL
MDC_IDC_SET_BRADY_PAV_DELAY_HIGH: 110 MS
MDC_IDC_SET_BRADY_PAV_DELAY_LOW: 130 MS
MDC_IDC_SET_BRADY_SAV_DELAY_HIGH: 85 MS
MDC_IDC_SET_BRADY_SAV_DELAY_LOW: 100 MS
MDC_IDC_SET_CRT_LVRV_DELAY: 0 MS
MDC_IDC_SET_CRT_PACED_CHAMBERS: NORMAL
MDC_IDC_SET_LEADCHNL_LV_PACING_AMPLITUDE: 2.5 V
MDC_IDC_SET_LEADCHNL_LV_PACING_ANODE_ELECTRODE_1: NORMAL
MDC_IDC_SET_LEADCHNL_LV_PACING_ANODE_LOCATION_1: NORMAL
MDC_IDC_SET_LEADCHNL_LV_PACING_CATHODE_ELECTRODE_1: NORMAL
MDC_IDC_SET_LEADCHNL_LV_PACING_CATHODE_LOCATION_1: NORMAL
MDC_IDC_SET_LEADCHNL_LV_PACING_PULSEWIDTH: 0.9 MS
MDC_IDC_SET_LEADCHNL_LV_SENSING_ADAPTATION_MODE: NORMAL
MDC_IDC_SET_LEADCHNL_LV_SENSING_ANODE_ELECTRODE_1: NORMAL
MDC_IDC_SET_LEADCHNL_LV_SENSING_ANODE_LOCATION_1: NORMAL
MDC_IDC_SET_LEADCHNL_LV_SENSING_CATHODE_ELECTRODE_1: NORMAL
MDC_IDC_SET_LEADCHNL_LV_SENSING_CATHODE_LOCATION_1: NORMAL
MDC_IDC_SET_LEADCHNL_LV_SENSING_SENSITIVITY: 1.5 MV
MDC_IDC_SET_LEADCHNL_RA_PACING_AMPLITUDE: 1.5 V
MDC_IDC_SET_LEADCHNL_RA_PACING_CAPTURE_MODE: NORMAL
MDC_IDC_SET_LEADCHNL_RA_PACING_POLARITY: NORMAL
MDC_IDC_SET_LEADCHNL_RA_PACING_PULSEWIDTH: 0.4 MS
MDC_IDC_SET_LEADCHNL_RA_SENSING_ADAPTATION_MODE: NORMAL
MDC_IDC_SET_LEADCHNL_RA_SENSING_POLARITY: NORMAL
MDC_IDC_SET_LEADCHNL_RA_SENSING_SENSITIVITY: 0.25 MV
MDC_IDC_SET_LEADCHNL_RV_PACING_AMPLITUDE: 2.2 V
MDC_IDC_SET_LEADCHNL_RV_PACING_CAPTURE_MODE: NORMAL
MDC_IDC_SET_LEADCHNL_RV_PACING_POLARITY: NORMAL
MDC_IDC_SET_LEADCHNL_RV_PACING_PULSEWIDTH: 0.4 MS
MDC_IDC_SET_LEADCHNL_RV_SENSING_ADAPTATION_MODE: NORMAL
MDC_IDC_SET_LEADCHNL_RV_SENSING_POLARITY: NORMAL
MDC_IDC_SET_LEADCHNL_RV_SENSING_SENSITIVITY: 1.5 MV
MDC_IDC_SET_ZONE_DETECTION_INTERVAL: 375 MS
MDC_IDC_SET_ZONE_STATUS: NORMAL
MDC_IDC_SET_ZONE_TYPE: NORMAL
MDC_IDC_SET_ZONE_VENDOR_TYPE: NORMAL
MDC_IDC_STAT_AT_BURDEN_PERCENT: 0 %
MDC_IDC_STAT_AT_DTM_END: NORMAL
MDC_IDC_STAT_AT_DTM_START: NORMAL
MDC_IDC_STAT_BRADY_DTM_END: NORMAL
MDC_IDC_STAT_BRADY_DTM_START: NORMAL
MDC_IDC_STAT_BRADY_RA_PERCENT_PACED: 35 %
MDC_IDC_STAT_BRADY_RV_PERCENT_PACED: 99 %
MDC_IDC_STAT_CRT_DTM_END: NORMAL
MDC_IDC_STAT_CRT_DTM_START: NORMAL
MDC_IDC_STAT_CRT_LV_PERCENT_PACED: 99 %
MDC_IDC_STAT_EPISODE_RECENT_COUNT: 0
MDC_IDC_STAT_EPISODE_RECENT_COUNT_DTM_END: NORMAL
MDC_IDC_STAT_EPISODE_RECENT_COUNT_DTM_START: NORMAL
MDC_IDC_STAT_EPISODE_TYPE: NORMAL
MDC_IDC_STAT_EPISODE_VENDOR_TYPE: NORMAL
MDC_IDC_STAT_EPISODE_VENDOR_TYPE: NORMAL

## 2025-04-11 PROCEDURE — 93296 REM INTERROG EVL PM/IDS: CPT | Performed by: INTERNAL MEDICINE

## 2025-04-11 PROCEDURE — 93294 REM INTERROG EVL PM/LDLS PM: CPT | Performed by: INTERNAL MEDICINE

## 2025-04-21 ENCOUNTER — TRANSFERRED RECORDS (OUTPATIENT)
Dept: HEALTH INFORMATION MANAGEMENT | Facility: CLINIC | Age: 85
End: 2025-04-21
Payer: MEDICARE

## 2025-04-24 ENCOUNTER — LAB (OUTPATIENT)
Dept: LAB | Facility: CLINIC | Age: 85
End: 2025-04-24
Payer: MEDICARE

## 2025-04-24 ENCOUNTER — TELEPHONE (OUTPATIENT)
Dept: CARDIOLOGY | Facility: CLINIC | Age: 85
End: 2025-04-24
Payer: MEDICARE

## 2025-04-24 DIAGNOSIS — I50.30 HEART FAILURE WITH PRESERVED EJECTION FRACTION, NYHA CLASS I (H): ICD-10-CM

## 2025-04-24 DIAGNOSIS — R05.9 COUGH, UNSPECIFIED TYPE: ICD-10-CM

## 2025-04-24 DIAGNOSIS — I50.30 HEART FAILURE WITH PRESERVED EJECTION FRACTION, NYHA CLASS I (H): Primary | ICD-10-CM

## 2025-04-24 LAB
ANION GAP SERPL CALCULATED.3IONS-SCNC: 8 MMOL/L (ref 7–15)
BUN SERPL-MCNC: 19.8 MG/DL (ref 8–23)
CALCIUM SERPL-MCNC: 9.3 MG/DL (ref 8.8–10.4)
CHLORIDE SERPL-SCNC: 103 MMOL/L (ref 98–107)
CREAT SERPL-MCNC: 0.77 MG/DL (ref 0.51–0.95)
EGFRCR SERPLBLD CKD-EPI 2021: 76 ML/MIN/1.73M2
GLUCOSE SERPL-MCNC: 109 MG/DL (ref 70–99)
HCO3 SERPL-SCNC: 28 MMOL/L (ref 22–29)
NT-PROBNP SERPL-MCNC: 216 PG/ML (ref 0–1800)
POTASSIUM SERPL-SCNC: 4.7 MMOL/L (ref 3.4–5.3)
SODIUM SERPL-SCNC: 139 MMOL/L (ref 135–145)

## 2025-04-24 PROCEDURE — 83880 ASSAY OF NATRIURETIC PEPTIDE: CPT

## 2025-04-24 PROCEDURE — 36415 COLL VENOUS BLD VENIPUNCTURE: CPT

## 2025-04-24 PROCEDURE — 80048 BASIC METABOLIC PNL TOTAL CA: CPT

## 2025-04-24 NOTE — TELEPHONE ENCOUNTER
----- Message from Andreia KIM sent at 4/24/2025  8:47 AM CDT -----  Regarding: LUIS terrazas  General phone call:    Caller: Jamarcus - Spouse  Primary cardiologist: LUIS terrazas  Detailed reason for call: Patient has had a cough for the past few weeks - last night when she was laying down sleeping she couldn't stop coughing.  It has been getting progressively worse in the past week      Best phone number: [phone]  Best time to contact: any  Ok to leave a detailedmessage? yes  Device? Pacer  Additional Info:

## 2025-04-24 NOTE — TELEPHONE ENCOUNTER
From: Reese Tom DO  Sent: 4/24/2025  12:18 PM CDT  To: Yue Adams RN    Would have patient obtain a BNP, CXR and BMP.  BNP level was previously normal so if markedly elevated would be concern for congestive heart failure.  Chest x-ray can rule out pneumonia or other noncardiac causes.  Check basic metabolic panel to check renal function electrolytes.  Indication is cough, orthopnea      ---------------------------------------------------------------------------------------------------------------------------------------------------------------------------      Assessment:   -  Assessment:       1. Productive cough - R05.8 (Primary)          Plan:   -  Treatment:   -  1. Productive cough        Imaging: XRAY : CHEST, 2 VIEWS, PA AND LATERAL (Performed Date - 04/21/2025)  -  Jyotsna Infante 04/22/2025 01:34:46 PM > CXR with no acute infectious process, as discussed in clinic.       Notes: Given duration of productive cough (3 weeks) and her age, recommend chest xray to rule out pneumonia. This was reassuring, so likely postnasal drip vs bronchitis. Given stable vital signs and overall feeling well, low suspicion for bacterial process.  - Consider trial of flonase  - Continue Delsym as needed for cough suppression- Recommend use of humidifier, especially during winter months- Follow up if symptoms persist or worsen              ---------------------------------------------------------------------------------------------------------------------------------------------------------------    Orders placed. Called patient and her  to explain orders when doing so, patient disclosed that she actually had a CXR on Monday at [facility] and this was not previously disclosed during initial coversation. She was dx with postnasal drip and provided flonase and recommendation for supportive measures. CXR result obtained- no cardiopulmonary process or mention of concern for fluid on the lungs. They will  still present for blood work. Rajan note + CXR sent to [facility] to scan to chart. Update to [facility]. -[facility]

## 2025-04-24 NOTE — TELEPHONE ENCOUNTER
"Called back Helena and Jamarcus to address concerns.  They report that Helena has had a junky, wet cough over the last few weeks. It seems to have gotten worse over the past week and is most bothersome at night when trying to sleep. She cannot cough any mucus up and per her report \" it just rattles in there\". Last night she slept in her recliner and this helped her sleep without coughing. She denies any shortness of breath or gasping when trying to sleep. It is just nagging per her report. Last viral illness to her memory was 2 months ago- COVID. No viral illness symptoms presently.     She does not weigh herself daily, but doesn't think she has gained any weight.  She has some mild LE edema but has had this since being seen last. She thinks maybe it is a little more noticeable.  She denies abdominal bloating or really any dyspnea on exertion or at rest. She reiterates that during the day, when moving around the home, she is not short of breath at all. She has a CRT-P and Jamarcus mentions concern for \"her 3rd lead\". Writer cannot speak to that but will route concerns of fluid retention being a cause of her mainly nocturnal cough. -[facility] [place]    Cardiac medication list reviewed and up to date. She is on spironolactone 25 mg daily, no other diuretic. -[facility] [place]      -------------------------------------------------------------------------------------------------------------------    Dr. Tom,  See above. Pt reporting wet, junky cough for the last few weeks. It is more troublesome at bedtime when trying to sleep and getting coughing spells. She cannot cough anything up but thinks it would be productive. She denies any shortness of breath with activity, rest, or with the coughing at bedtime. Any recommendations?  Thanks,  Mal   "

## 2025-07-23 ENCOUNTER — TELEPHONE (OUTPATIENT)
Dept: CARDIOLOGY | Facility: CLINIC | Age: 85
End: 2025-07-23

## 2025-07-23 DIAGNOSIS — I42.8 NON-ISCHEMIC CARDIOMYOPATHY (H): ICD-10-CM

## 2025-07-23 DIAGNOSIS — Z95.0 STATUS POST BIVENTRICULAR PACEMAKER: Primary | ICD-10-CM

## 2025-07-23 DIAGNOSIS — I49.5 SICK SINUS SYNDROME (H): ICD-10-CM

## 2025-07-23 DIAGNOSIS — I50.30 HEART FAILURE WITH PRESERVED EJECTION FRACTION, NYHA CLASS I (H): ICD-10-CM

## 2025-07-23 NOTE — TELEPHONE ENCOUNTER
----- Message from Hailey JAIMIE Craig sent at 7/23/2025  9:00 AM CDT -----  Regarding: device RN review  Encounter Type: Routine remote CRT-P transmission.   Device: [employer]SC[employer] Valitude.   Pacing % /Programmed: AP 35%, biVP 99% at DDDR 60/130 ppm.  Lead(s): stable.   Battery longevity: 4yrs, 6mo estimated.   Presenting: AP-biVP 60 bpm.   Atrial high rates: since 4/8/25; One mode switch episode <1min, appears to be atrial tachy arrhythmia.   Anticoagulant: None. Takes ASA 81mg daily.   Ventricular High rates: since 4/8/25; two ventricular high rate episodes, one appears to be PSVT. Episode V-5 appears to be NSVT 16bts 180-200 bpm, duration 5 seconds.   Comments: Normal device function. Routed to device RN for review of VHR episode.  Plan: Patient scheduled for annual device check on 9/9/25 at 10:00AM at our [facility] location. RL Craig, Device Specialist        Brief NSVT ~5 seconds noted back on 5/13/25. Last EF 60-65% per Echo on 10/2/2024.  Call placed to patient to review findings, she denies any episodes of lightheadedness/near-syncope. Is due for annual visit with Dr. Tom and device in September. Encouraged to call and make appointments today. Verbalized understanding.     Will update Dr. Tom for any other recommendations.   Swathi Molina, RN

## 2025-07-28 ENCOUNTER — ANCILLARY PROCEDURE (OUTPATIENT)
Dept: CARDIOLOGY | Facility: CLINIC | Age: 85
End: 2025-07-28
Attending: INTERNAL MEDICINE
Payer: MEDICARE

## 2025-07-28 DIAGNOSIS — I44.2 THIRD DEGREE AV BLOCK (H): ICD-10-CM

## 2025-07-28 DIAGNOSIS — Z95.0 STATUS POST BIVENTRICULAR PACEMAKER: ICD-10-CM

## 2025-07-28 DIAGNOSIS — I42.8 NON-ISCHEMIC CARDIOMYOPATHY (H): ICD-10-CM

## 2025-07-28 DIAGNOSIS — I49.5 SICK SINUS SYNDROME (H): ICD-10-CM

## 2025-07-28 LAB
MDC_IDC_LEAD_CONNECTION_STATUS: NORMAL
MDC_IDC_LEAD_IMPLANT_DT: NORMAL
MDC_IDC_LEAD_LOCATION: NORMAL
MDC_IDC_LEAD_LOCATION_DETAIL_1: NORMAL
MDC_IDC_LEAD_MFG: NORMAL
MDC_IDC_LEAD_MODEL: NORMAL
MDC_IDC_LEAD_POLARITY_TYPE: NORMAL
MDC_IDC_LEAD_SERIAL: NORMAL
MDC_IDC_LEAD_SPECIAL_FUNCTION: NORMAL
MDC_IDC_MSMT_BATTERY_DTM: NORMAL
MDC_IDC_MSMT_BATTERY_REMAINING_LONGEVITY: 54 MO
MDC_IDC_MSMT_BATTERY_REMAINING_PERCENTAGE: 62 %
MDC_IDC_MSMT_BATTERY_STATUS: NORMAL
MDC_IDC_MSMT_LEADCHNL_LV_IMPEDANCE_VALUE: 608 OHM
MDC_IDC_MSMT_LEADCHNL_LV_PACING_THRESHOLD_AMPLITUDE: 1.5 V
MDC_IDC_MSMT_LEADCHNL_LV_PACING_THRESHOLD_PULSEWIDTH: 0.9 MS
MDC_IDC_MSMT_LEADCHNL_LV_SENSING_INTR_AMPL: 25 MV
MDC_IDC_MSMT_LEADCHNL_RA_IMPEDANCE_VALUE: 559 OHM
MDC_IDC_MSMT_LEADCHNL_RA_PACING_THRESHOLD_AMPLITUDE: 0.7 V
MDC_IDC_MSMT_LEADCHNL_RA_PACING_THRESHOLD_PULSEWIDTH: 0.4 MS
MDC_IDC_MSMT_LEADCHNL_RA_SENSING_INTR_AMPL: 2.8 MV
MDC_IDC_MSMT_LEADCHNL_RV_IMPEDANCE_VALUE: 783 OHM
MDC_IDC_MSMT_LEADCHNL_RV_PACING_THRESHOLD_AMPLITUDE: 1 V
MDC_IDC_MSMT_LEADCHNL_RV_PACING_THRESHOLD_PULSEWIDTH: 0.4 MS
MDC_IDC_MSMT_LEADCHNL_RV_SENSING_INTR_AMPL: 9.7 MV
MDC_IDC_PG_IMPLANT_DTM: NORMAL
MDC_IDC_PG_MFG: NORMAL
MDC_IDC_PG_MODEL: NORMAL
MDC_IDC_PG_SERIAL: NORMAL
MDC_IDC_PG_TYPE: NORMAL
MDC_IDC_SESS_CLINIC_NAME: NORMAL
MDC_IDC_SESS_DTM: NORMAL
MDC_IDC_SESS_TYPE: NORMAL
MDC_IDC_SET_BRADY_AT_MODE_SWITCH_MODE: NORMAL
MDC_IDC_SET_BRADY_AT_MODE_SWITCH_RATE: 170 {BEATS}/MIN
MDC_IDC_SET_BRADY_LOWRATE: 60 {BEATS}/MIN
MDC_IDC_SET_BRADY_MAX_SENSOR_RATE: 130 {BEATS}/MIN
MDC_IDC_SET_BRADY_MAX_TRACKING_RATE: 130 {BEATS}/MIN
MDC_IDC_SET_BRADY_MODE: NORMAL
MDC_IDC_SET_BRADY_PAV_DELAY_HIGH: 110 MS
MDC_IDC_SET_BRADY_PAV_DELAY_LOW: 130 MS
MDC_IDC_SET_BRADY_SAV_DELAY_HIGH: 85 MS
MDC_IDC_SET_BRADY_SAV_DELAY_LOW: 100 MS
MDC_IDC_SET_CRT_LVRV_DELAY: 0 MS
MDC_IDC_SET_CRT_PACED_CHAMBERS: NORMAL
MDC_IDC_SET_LEADCHNL_LV_PACING_AMPLITUDE: 2.5 V
MDC_IDC_SET_LEADCHNL_LV_PACING_ANODE_ELECTRODE_1: NORMAL
MDC_IDC_SET_LEADCHNL_LV_PACING_ANODE_LOCATION_1: NORMAL
MDC_IDC_SET_LEADCHNL_LV_PACING_CATHODE_ELECTRODE_1: NORMAL
MDC_IDC_SET_LEADCHNL_LV_PACING_CATHODE_LOCATION_1: NORMAL
MDC_IDC_SET_LEADCHNL_LV_PACING_PULSEWIDTH: 0.9 MS
MDC_IDC_SET_LEADCHNL_LV_SENSING_ADAPTATION_MODE: NORMAL
MDC_IDC_SET_LEADCHNL_LV_SENSING_ANODE_ELECTRODE_1: NORMAL
MDC_IDC_SET_LEADCHNL_LV_SENSING_ANODE_LOCATION_1: NORMAL
MDC_IDC_SET_LEADCHNL_LV_SENSING_CATHODE_ELECTRODE_1: NORMAL
MDC_IDC_SET_LEADCHNL_LV_SENSING_CATHODE_LOCATION_1: NORMAL
MDC_IDC_SET_LEADCHNL_LV_SENSING_SENSITIVITY: 1.5 MV
MDC_IDC_SET_LEADCHNL_RA_PACING_AMPLITUDE: 1.5 V
MDC_IDC_SET_LEADCHNL_RA_PACING_CAPTURE_MODE: NORMAL
MDC_IDC_SET_LEADCHNL_RA_PACING_POLARITY: NORMAL
MDC_IDC_SET_LEADCHNL_RA_PACING_PULSEWIDTH: 0.4 MS
MDC_IDC_SET_LEADCHNL_RA_SENSING_ADAPTATION_MODE: NORMAL
MDC_IDC_SET_LEADCHNL_RA_SENSING_POLARITY: NORMAL
MDC_IDC_SET_LEADCHNL_RA_SENSING_SENSITIVITY: 0.25 MV
MDC_IDC_SET_LEADCHNL_RV_PACING_AMPLITUDE: NORMAL
MDC_IDC_SET_LEADCHNL_RV_PACING_CAPTURE_MODE: NORMAL
MDC_IDC_SET_LEADCHNL_RV_PACING_POLARITY: NORMAL
MDC_IDC_SET_LEADCHNL_RV_PACING_PULSEWIDTH: 0.4 MS
MDC_IDC_SET_LEADCHNL_RV_SENSING_ADAPTATION_MODE: NORMAL
MDC_IDC_SET_LEADCHNL_RV_SENSING_POLARITY: NORMAL
MDC_IDC_SET_LEADCHNL_RV_SENSING_SENSITIVITY: 1.5 MV
MDC_IDC_SET_ZONE_DETECTION_INTERVAL: 375 MS
MDC_IDC_SET_ZONE_STATUS: NORMAL
MDC_IDC_SET_ZONE_TYPE: NORMAL
MDC_IDC_SET_ZONE_VENDOR_TYPE: NORMAL
MDC_IDC_STAT_AT_BURDEN_PERCENT: 1 %
MDC_IDC_STAT_AT_DTM_END: NORMAL
MDC_IDC_STAT_AT_DTM_START: NORMAL
MDC_IDC_STAT_AT_MODE_SW_COUNT: 1
MDC_IDC_STAT_BRADY_DTM_END: NORMAL
MDC_IDC_STAT_BRADY_DTM_START: NORMAL
MDC_IDC_STAT_BRADY_RA_PERCENT_PACED: 35 %
MDC_IDC_STAT_BRADY_RV_PERCENT_PACED: 99 %
MDC_IDC_STAT_CRT_DTM_END: NORMAL
MDC_IDC_STAT_CRT_DTM_START: NORMAL
MDC_IDC_STAT_CRT_LV_PERCENT_PACED: 99 %
MDC_IDC_STAT_EPISODE_RECENT_COUNT: 0
MDC_IDC_STAT_EPISODE_RECENT_COUNT: 0
MDC_IDC_STAT_EPISODE_RECENT_COUNT: 2
MDC_IDC_STAT_EPISODE_RECENT_COUNT_DTM_END: NORMAL
MDC_IDC_STAT_EPISODE_RECENT_COUNT_DTM_START: NORMAL
MDC_IDC_STAT_EPISODE_TYPE: NORMAL
MDC_IDC_STAT_EPISODE_VENDOR_TYPE: NORMAL
MDC_IDC_STAT_EPISODE_VENDOR_TYPE: NORMAL

## 2025-07-28 PROCEDURE — 93281 PM DEVICE PROGR EVAL MULTI: CPT | Performed by: INTERNAL MEDICINE
